# Patient Record
Sex: MALE | Race: WHITE | Employment: FULL TIME
[De-identification: names, ages, dates, MRNs, and addresses within clinical notes are randomized per-mention and may not be internally consistent; named-entity substitution may affect disease eponyms.]

---

## 2017-11-14 LAB
CHOLESTEROL, TOTAL: 155 MG/DL
CHOLESTEROL/HDL RATIO: NORMAL
HDLC SERPL-MCNC: 51 MG/DL (ref 35–70)
LDL CHOLESTEROL CALCULATED: 79 MG/DL (ref 0–160)
TRIGL SERPL-MCNC: 157 MG/DL
VLDLC SERPL CALC-MCNC: NORMAL MG/DL

## 2017-12-23 PROBLEM — G47.33 OSA (OBSTRUCTIVE SLEEP APNEA): Status: ACTIVE | Noted: 2017-12-23

## 2017-12-23 PROBLEM — G25.81 RLS (RESTLESS LEGS SYNDROME): Status: ACTIVE | Noted: 2017-12-23

## 2017-12-23 PROBLEM — G43.909 MIGRAINES: Status: ACTIVE | Noted: 2017-12-23

## 2017-12-23 PROBLEM — D50.9 MICROCYTIC ANEMIA: Status: ACTIVE | Noted: 2017-12-23

## 2017-12-23 PROBLEM — Z72.820 POOR SLEEP: Status: ACTIVE | Noted: 2017-12-23

## 2017-12-23 PROBLEM — R91.1 PULMONARY NODULE: Status: ACTIVE | Noted: 2017-12-23

## 2017-12-23 PROBLEM — E66.9 OBESITY: Status: ACTIVE | Noted: 2017-12-23

## 2017-12-23 PROBLEM — R94.6 BORDERLINE ABNORMAL TFTS: Status: ACTIVE | Noted: 2017-11-14

## 2017-12-23 PROBLEM — D64.9 SYMPTOMATIC ANEMIA: Status: ACTIVE | Noted: 2017-12-23

## 2017-12-23 PROBLEM — Z87.891 FORMER SMOKER: Chronic | Status: ACTIVE | Noted: 2017-12-23

## 2017-12-23 PROBLEM — K58.0 IRRITABLE BOWEL SYNDROME WITH DIARRHEA: Chronic | Status: ACTIVE | Noted: 2017-12-23

## 2018-01-10 ENCOUNTER — TELEPHONE (OUTPATIENT)
Dept: CASE MANAGEMENT | Age: 38
End: 2018-01-10

## 2018-01-10 NOTE — LETTER
CT Lung Screening/ Lung Nodule Program  [address] [place] [address]. [place], [address]       TO:   Yenny Castellon MD    FAX:   [phone]        FROM:   [facility]    DATE:   1/10/2018    PHONE:  [phone]    FAX:    [phone]      Comments:  Incidental Pulmonary Nodule Notification    RE:  Jose Vu  1980        The information contained in this fax message is intended only for Personal and Confidential use of the designated recipient(s) names above. This information is to be handled as Privileged and Confidential. If the reader of this message is not the intended recipient, you are hereby notified that you have received this document in error, and that any review, dissemination, distribution, or copying of this message is strictly prohibited. If you receive this communication in error, please notify us immediately at the above number and return the original message to us by mail. Thank you. Member of [facility]. Medical Imaging Services                              1/10/2018           [facility] [address]  Fax: [phone]      Dear  Yenny Castellon MD :                                      Your patient, Jose Vu (1980) had an imaging study (12/23/17) that noted an incidental pulmonary nodule/s. Follow up was recommended according to [place] for Management of Pulmonary Nodules If this patient is not currently active within your practice, please contact us immediately so we can update records. This patient will not be monitored by our incidental pulmonary nodule program this letter serves as a notification only. If you have any additional questions or concerns regarding our Lung Nodule Program or our CT Lung Cancer Screening Program,  please don't hesitate to call.             Sincerely,

## 2019-04-17 ENCOUNTER — HOSPITAL ENCOUNTER (OUTPATIENT)
Dept: NUCLEAR MEDICINE | Age: 39
Discharge: HOME | End: 2019-04-17
Payer: COMMERCIAL

## 2019-04-17 DIAGNOSIS — K92.0 GASTROINTESTINAL HEMORRHAGE WITH HEMATEMESIS: ICD-10-CM

## 2019-04-17 PROCEDURE — A9512 TC99M PERTECHNETATE: HCPCS | Performed by: RADIOLOGY

## 2019-04-17 PROCEDURE — 78290 INTESTINE IMAGING: CPT

## 2019-04-17 PROCEDURE — 3430000000 HC RX DIAGNOSTIC RADIOPHARMACEUTICAL: Performed by: RADIOLOGY

## 2019-04-17 PROCEDURE — 2500000003 HC RX 250 WO HCPCS: Performed by: RADIOLOGY

## 2019-04-17 RX ADMIN — Medication 15 MILLICURIE: at 10:28

## 2019-04-17 RX ADMIN — GLUCAGON HYDROCHLORIDE 1 MG: KIT at 10:42

## 2019-05-06 VITALS
SYSTOLIC BLOOD PRESSURE: 130 MMHG | DIASTOLIC BLOOD PRESSURE: 78 MMHG | WEIGHT: 229 LBS | HEIGHT: 71 IN | TEMPERATURE: 97.5 F | HEART RATE: 118 BPM | BODY MASS INDEX: 32.06 KG/M2

## 2019-05-16 ENCOUNTER — HOSPITAL ENCOUNTER (OUTPATIENT)
Age: 39
Discharge: HOME | End: 2019-05-18
Payer: COMMERCIAL

## 2019-05-16 ENCOUNTER — OFFICE VISIT (OUTPATIENT)
Dept: PRIMARY CARE CLINIC | Age: 39
End: 2019-05-16
Payer: COMMERCIAL

## 2019-05-16 VITALS
HEART RATE: 106 BPM | TEMPERATURE: 96.9 F | HEIGHT: 71 IN | WEIGHT: 235.2 LBS | DIASTOLIC BLOOD PRESSURE: 80 MMHG | BODY MASS INDEX: 32.93 KG/M2 | SYSTOLIC BLOOD PRESSURE: 124 MMHG

## 2019-05-16 DIAGNOSIS — K58.2 IRRITABLE BOWEL SYNDROME WITH BOTH CONSTIPATION AND DIARRHEA: ICD-10-CM

## 2019-05-16 DIAGNOSIS — E03.4 HYPOTHYROIDISM DUE TO ACQUIRED ATROPHY OF THYROID: ICD-10-CM

## 2019-05-16 DIAGNOSIS — K21.9 GASTROESOPHAGEAL REFLUX DISEASE WITHOUT ESOPHAGITIS: Chronic | ICD-10-CM

## 2019-05-16 DIAGNOSIS — G47.33 OSA (OBSTRUCTIVE SLEEP APNEA): ICD-10-CM

## 2019-05-16 DIAGNOSIS — E03.4 HYPOTHYROIDISM DUE TO ACQUIRED ATROPHY OF THYROID: Primary | ICD-10-CM

## 2019-05-16 DIAGNOSIS — D50.0 IRON DEFICIENCY ANEMIA DUE TO CHRONIC BLOOD LOSS: ICD-10-CM

## 2019-05-16 LAB
T3 TOTAL: 115.1 NG/DL (ref 80–200)
T4 TOTAL: 6.8 MCG/DL (ref 4.5–11.7)
TSH SERPL DL<=0.05 MIU/L-ACNC: 1.77 UIU/ML (ref 0.27–4.2)

## 2019-05-16 PROCEDURE — 36415 COLL VENOUS BLD VENIPUNCTURE: CPT

## 2019-05-16 PROCEDURE — 99214 OFFICE O/P EST MOD 30 MIN: CPT | Performed by: INTERNAL MEDICINE

## 2019-05-16 PROCEDURE — 84436 ASSAY OF TOTAL THYROXINE: CPT

## 2019-05-16 PROCEDURE — 84480 ASSAY TRIIODOTHYRONINE (T3): CPT

## 2019-05-16 PROCEDURE — 84443 ASSAY THYROID STIM HORMONE: CPT

## 2019-05-16 ASSESSMENT — PATIENT HEALTH QUESTIONNAIRE - PHQ9
1. LITTLE INTEREST OR PLEASURE IN DOING THINGS: 0
SUM OF ALL RESPONSES TO PHQ9 QUESTIONS 1 & 2: 0
SUM OF ALL RESPONSES TO PHQ QUESTIONS 1-9: 0
SUM OF ALL RESPONSES TO PHQ QUESTIONS 1-9: 0
2. FEELING DOWN, DEPRESSED OR HOPELESS: 0

## 2019-05-16 ASSESSMENT — ENCOUNTER SYMPTOMS
GASTROINTESTINAL NEGATIVE: 1
EYE PAIN: 0
SORE THROAT: 0
COUGH: 0
SINUS PAIN: 0
EYE ITCHING: 0
FACIAL SWELLING: 0
PHOTOPHOBIA: 0
CHEST TIGHTNESS: 0
SHORTNESS OF BREATH: 0
VOICE CHANGE: 0
WHEEZING: 0
EYE DISCHARGE: 0

## 2019-05-16 NOTE — PROGRESS NOTES
[address] [facility],[address]  Phone: [phone]  Fax: [phone]    Patient:  Hannah Fischer 34 y.o. male                                 Date of Service: 5/16/19                            Chiefcomplaint:   Chief Complaint   Patient presents with    Gastroesophageal Reflux    Bleeding/Bruising    Anemia         History of Present Illness: The patient is a 34 y.o. male  presented to the clinic with complaints as above. Gastro Intestinal bleeding with associated anemia - remains tired, iron levels still low but not anemic as per Hem. 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 Hx of borderline thyroid labs    Review of Systems:   Review of Systems   Constitutional: Negative. HENT: Negative for congestion, ear pain, facial swelling, hearing loss, mouth sores, nosebleeds, sinus pain, sneezing, sore throat, tinnitus and voice change. Eyes: Negative for photophobia, pain, discharge, itching and visual disturbance. Respiratory: Negative for cough, chest tightness, shortness of breath and wheezing. Cardiovascular: Negative. Gastrointestinal: Negative. Endocrine: Negative. Genitourinary: Negative. Musculoskeletal: Negative. Skin: Negative. Hematological: Negative. Psychiatric/Behavioral: Negative for agitation, behavioral problems, confusion, decreased concentration and sleep disturbance. The patient is not nervous/anxious. Past Medical History:      Diagnosis Date    Anemia 11/2014    Borderline abnormal TFTs 11/2017    Not yet on thyroid hormone replacement. PCP was planning to repeat TFTs soon first.    GERD (gastroesophageal reflux disease)     For the last 15+ yrs as of 2017.     Hyperlipidemia     IBS (irritable bowel syndrome)     with long-pattern of ~4 loose BMs per day, increased to 5-6/day in the latter half of 2017       Past Surgical History:        Procedure Laterality Date    ANKLE SURGERY Right 09/09/2014    ORIF right ankle fracture CINDY Hamilton MD    COLONOSCOPY  12/21/2017    [facility] Negative per pt.  UPPER GASTROINTESTINAL ENDOSCOPY  12/21/2017    Done at [address] [place] [address]. Negative except for possibly a couple of spots of gastritis per pt - biopsied, results pending. Allergies:    Patient has no known allergies. Social History:   Social History     Socioeconomic History    Marital status:      Spouse name: Not on file    Number of children: Not on file    Years of education: Not on file    Highest education level: Not on file   Occupational History    Not on file   Social Needs    Financial resource strain: Not on file    Food insecurity:     Worry: Not on file     Inability: Not on file    Transportation needs:     Medical: Not on file     Non-medical: Not on file   Tobacco Use    Smoking status: Former Smoker     Packs/day: 1.00     Types: Cigarettes    Smokeless tobacco: Never Used   Substance and Sexual Activity    Alcohol use:  Yes     Alcohol/week: 2.4 oz     Types: 4 Cans of beer per week    Drug use: No    Sexual activity: Not on file   Lifestyle    Physical activity:     Days per week: Not on file     Minutes per session: Not on file    Stress: Not on file   Relationships    Social connections:     Talks on phone: Not on file     Gets together: Not on file     Attends Anabaptist service: Not on file     Active member of club or organization: Not on file     Attends meetings of clubs or organizations: Not on file     Relationship status: Not on file    Intimate partner violence:     Fear of current or ex partner: Not on file     Emotionally abused: Not on file     Physically abused: Not on file     Forced sexual activity: Not on file   Other Topics Concern [employer]: [email or web address] [email or web address]  Please bring any questions to me at your next visit. Return to Office: No follow-ups on file. Medication List:    Current Outpatient Medications   Medication Sig Dispense Refill    pantoprazole (PROTONIX) 40 MG tablet Take 40 mg by mouth daily.  dicyclomine (BENTYL) 10 MG capsule Take 10 mg by mouth 4 times daily (before meals and nightly)      ferrous sulfate 325 (65 Fe) MG tablet Take 325 mg by mouth daily (with breakfast)      HYDROcodone-acetaminophen (NORCO) 5-325 MG per tablet Take 1 tablet by mouth every 6 hours as needed for Pain. No current facility-administered medications for this visit. Ursula Cross MD       This document may have been prepared at least partiallythrough the use of voice recognition software. Although effort is taken to assure the accuracy of this document, it is possible that grammatical, syntax,  or spelling errors may occur.

## 2019-05-23 ENCOUNTER — TELEPHONE (OUTPATIENT)
Dept: PRIMARY CARE CLINIC | Age: 39
End: 2019-05-23

## 2019-05-23 NOTE — TELEPHONE ENCOUNTER
Pt lm stating that he never heard anything about sleep study not sure how referral work with new system. Tried to call pt back to see what facility but # keep ringing.

## 2019-09-22 ENCOUNTER — APPOINTMENT (OUTPATIENT)
Dept: ULTRASOUND IMAGING | Age: 39
DRG: 446 | End: 2019-09-22
Payer: COMMERCIAL

## 2019-09-22 ENCOUNTER — APPOINTMENT (OUTPATIENT)
Dept: CT IMAGING | Age: 39
DRG: 446 | End: 2019-09-22
Payer: COMMERCIAL

## 2019-09-22 ENCOUNTER — HOSPITAL ENCOUNTER (INPATIENT)
Age: 39
LOS: 2 days | Discharge: HOME | DRG: 446 | End: 2019-09-24
Attending: EMERGENCY MEDICINE | Admitting: INTERNAL MEDICINE
Payer: COMMERCIAL

## 2019-09-22 ENCOUNTER — APPOINTMENT (OUTPATIENT)
Dept: GENERAL RADIOLOGY | Age: 39
DRG: 446 | End: 2019-09-22
Payer: COMMERCIAL

## 2019-09-22 DIAGNOSIS — K80.50 CHOLEDOCHOLITHIASIS: Primary | ICD-10-CM

## 2019-09-22 LAB
ALBUMIN SERPL-MCNC: 4.4 G/DL (ref 3.5–5.2)
ALP BLD-CCNC: 66 U/L (ref 40–129)
ALT SERPL-CCNC: 78 U/L (ref 0–40)
ANION GAP SERPL CALCULATED.3IONS-SCNC: 12 MMOL/L (ref 7–16)
AST SERPL-CCNC: 89 U/L (ref 0–39)
BILIRUB SERPL-MCNC: 0.2 MG/DL (ref 0–1.2)
BUN BLDV-MCNC: 13 MG/DL (ref 6–20)
CALCIUM SERPL-MCNC: 9 MG/DL (ref 8.6–10.2)
CHLORIDE BLD-SCNC: 104 MMOL/L (ref 98–107)
CO2: 25 MMOL/L (ref 22–29)
CREAT SERPL-MCNC: 1.1 MG/DL (ref 0.7–1.2)
GFR AFRICAN AMERICAN: >60
GFR NON-AFRICAN AMERICAN: >60 ML/MIN/1.73
GLUCOSE BLD-MCNC: 107 MG/DL (ref 74–99)
HCT VFR BLD CALC: 40 % (ref 37–54)
HEMOGLOBIN: 12.7 G/DL (ref 12.5–16.5)
LACTIC ACID: 1.6 MMOL/L (ref 0.5–2.2)
LIPASE: 44 U/L (ref 13–60)
MCH RBC QN AUTO: 28 PG (ref 26–35)
MCHC RBC AUTO-ENTMCNC: 31.8 % (ref 32–34.5)
MCV RBC AUTO: 88.1 FL (ref 80–99.9)
PDW BLD-RTO: 15.1 FL (ref 11.5–15)
PLATELET # BLD: 244 E9/L (ref 130–450)
PMV BLD AUTO: 10.7 FL (ref 7–12)
POTASSIUM SERPL-SCNC: 4.1 MMOL/L (ref 3.5–5)
PRO-BNP: 78 PG/ML (ref 0–125)
RBC # BLD: 4.54 E12/L (ref 3.8–5.8)
SODIUM BLD-SCNC: 141 MMOL/L (ref 132–146)
TOTAL PROTEIN: 6.7 G/DL (ref 6.4–8.3)
TROPONIN: <0.01 NG/ML (ref 0–0.03)
WBC # BLD: 8.7 E9/L (ref 4.5–11.5)

## 2019-09-22 PROCEDURE — 99285 EMERGENCY DEPT VISIT HI MDM: CPT

## 2019-09-22 PROCEDURE — 96375 TX/PRO/DX INJ NEW DRUG ADDON: CPT

## 2019-09-22 PROCEDURE — 93005 ELECTROCARDIOGRAM TRACING: CPT | Performed by: EMERGENCY MEDICINE

## 2019-09-22 PROCEDURE — 83880 ASSAY OF NATRIURETIC PEPTIDE: CPT

## 2019-09-22 PROCEDURE — 76705 ECHO EXAM OF ABDOMEN: CPT

## 2019-09-22 PROCEDURE — 85027 COMPLETE CBC AUTOMATED: CPT

## 2019-09-22 PROCEDURE — 96365 THER/PROPH/DIAG IV INF INIT: CPT

## 2019-09-22 PROCEDURE — 6370000000 HC RX 637 (ALT 250 FOR IP): Performed by: INTERNAL MEDICINE

## 2019-09-22 PROCEDURE — G0378 HOSPITAL OBSERVATION PER HR: HCPCS

## 2019-09-22 PROCEDURE — 96374 THER/PROPH/DIAG INJ IV PUSH: CPT

## 2019-09-22 PROCEDURE — 6360000004 HC RX CONTRAST MEDICATION: Performed by: RADIOLOGY

## 2019-09-22 PROCEDURE — 83605 ASSAY OF LACTIC ACID: CPT

## 2019-09-22 PROCEDURE — 2580000003 HC RX 258: Performed by: INTERNAL MEDICINE

## 2019-09-22 PROCEDURE — 71046 X-RAY EXAM CHEST 2 VIEWS: CPT

## 2019-09-22 PROCEDURE — 84484 ASSAY OF TROPONIN QUANT: CPT

## 2019-09-22 PROCEDURE — 74177 CT ABD & PELVIS W/CONTRAST: CPT

## 2019-09-22 PROCEDURE — 6360000002 HC RX W HCPCS: Performed by: STUDENT IN AN ORGANIZED HEALTH CARE EDUCATION/TRAINING PROGRAM

## 2019-09-22 PROCEDURE — 83690 ASSAY OF LIPASE: CPT

## 2019-09-22 PROCEDURE — 80053 COMPREHEN METABOLIC PANEL: CPT

## 2019-09-22 PROCEDURE — C9113 INJ PANTOPRAZOLE SODIUM, VIA: HCPCS | Performed by: STUDENT IN AN ORGANIZED HEALTH CARE EDUCATION/TRAINING PROGRAM

## 2019-09-22 PROCEDURE — 2580000003 HC RX 258: Performed by: RADIOLOGY

## 2019-09-22 PROCEDURE — 1200000000 HC SEMI PRIVATE

## 2019-09-22 PROCEDURE — 2580000003 HC RX 258: Performed by: STUDENT IN AN ORGANIZED HEALTH CARE EDUCATION/TRAINING PROGRAM

## 2019-09-22 PROCEDURE — 96367 TX/PROPH/DG ADDL SEQ IV INF: CPT

## 2019-09-22 PROCEDURE — 2500000003 HC RX 250 WO HCPCS: Performed by: STUDENT IN AN ORGANIZED HEALTH CARE EDUCATION/TRAINING PROGRAM

## 2019-09-22 RX ORDER — SODIUM CHLORIDE 9 MG/ML
INJECTION, SOLUTION INTRAVENOUS CONTINUOUS
Status: DISCONTINUED | OUTPATIENT
Start: 2019-09-22 | End: 2019-09-23

## 2019-09-22 RX ORDER — PANTOPRAZOLE SODIUM 40 MG/1
40 TABLET, DELAYED RELEASE ORAL
Status: DISCONTINUED | OUTPATIENT
Start: 2019-09-23 | End: 2019-09-24 | Stop reason: HOSPADM

## 2019-09-22 RX ORDER — PANTOPRAZOLE SODIUM 40 MG/10ML
40 INJECTION, POWDER, LYOPHILIZED, FOR SOLUTION INTRAVENOUS ONCE
Status: COMPLETED | OUTPATIENT
Start: 2019-09-22 | End: 2019-09-22

## 2019-09-22 RX ORDER — MORPHINE SULFATE 2 MG/ML
1 INJECTION, SOLUTION INTRAMUSCULAR; INTRAVENOUS EVERY 4 HOURS PRN
Status: DISCONTINUED | OUTPATIENT
Start: 2019-09-22 | End: 2019-09-23

## 2019-09-22 RX ORDER — DICYCLOMINE HYDROCHLORIDE 10 MG/1
10 CAPSULE ORAL
Status: DISCONTINUED | OUTPATIENT
Start: 2019-09-22 | End: 2019-09-24 | Stop reason: HOSPADM

## 2019-09-22 RX ORDER — MORPHINE SULFATE 8 MG/ML
8 INJECTION, SOLUTION INTRAMUSCULAR; INTRAVENOUS ONCE
Status: COMPLETED | OUTPATIENT
Start: 2019-09-22 | End: 2019-09-22

## 2019-09-22 RX ORDER — ACETAMINOPHEN 325 MG/1
650 TABLET ORAL EVERY 4 HOURS PRN
Status: DISCONTINUED | OUTPATIENT
Start: 2019-09-22 | End: 2019-09-23

## 2019-09-22 RX ORDER — SODIUM CHLORIDE 0.9 % (FLUSH) 0.9 %
10 SYRINGE (ML) INJECTION PRN
Status: DISCONTINUED | OUTPATIENT
Start: 2019-09-22 | End: 2019-09-23 | Stop reason: SDUPTHER

## 2019-09-22 RX ORDER — 0.9 % SODIUM CHLORIDE 0.9 %
1000 INTRAVENOUS SOLUTION INTRAVENOUS ONCE
Status: COMPLETED | OUTPATIENT
Start: 2019-09-22 | End: 2019-09-22

## 2019-09-22 RX ADMIN — METRONIDAZOLE 500 MG: 500 INJECTION, SOLUTION INTRAVENOUS at 20:20

## 2019-09-22 RX ADMIN — SODIUM CHLORIDE: 9 INJECTION, SOLUTION INTRAVENOUS at 22:53

## 2019-09-22 RX ADMIN — IOPAMIDOL 110 ML: 755 INJECTION, SOLUTION INTRAVENOUS at 18:51

## 2019-09-22 RX ADMIN — Medication 10 ML: at 18:51

## 2019-09-22 RX ADMIN — MORPHINE SULFATE 8 MG: 8 INJECTION INTRAVENOUS at 19:37

## 2019-09-22 RX ADMIN — SODIUM CHLORIDE 1000 ML: 9 INJECTION, SOLUTION INTRAVENOUS at 17:33

## 2019-09-22 RX ADMIN — DICYCLOMINE HYDROCHLORIDE 10 MG: 10 CAPSULE ORAL at 22:52

## 2019-09-22 RX ADMIN — SODIUM CHLORIDE: 9 INJECTION, SOLUTION INTRAVENOUS at 20:20

## 2019-09-22 RX ADMIN — PANTOPRAZOLE SODIUM 40 MG: 40 INJECTION, POWDER, FOR SOLUTION INTRAVENOUS at 19:02

## 2019-09-22 RX ADMIN — CEFTRIAXONE 2 G: 2 INJECTION, POWDER, FOR SOLUTION INTRAMUSCULAR; INTRAVENOUS at 19:41

## 2019-09-22 ASSESSMENT — PAIN DESCRIPTION - LOCATION: LOCATION: CHEST

## 2019-09-22 ASSESSMENT — ENCOUNTER SYMPTOMS
ABDOMINAL PAIN: 1
COUGH: 0
DIARRHEA: 1
BLOOD IN STOOL: 0
NAUSEA: 1
TROUBLE SWALLOWING: 0
VOICE CHANGE: 0
COLOR CHANGE: 0
CONSTIPATION: 0
BACK PAIN: 0
SHORTNESS OF BREATH: 0
SORE THROAT: 0
VOMITING: 0

## 2019-09-22 ASSESSMENT — PAIN SCALES - GENERAL
PAINLEVEL_OUTOF10: 4
PAINLEVEL_OUTOF10: 0
PAINLEVEL_OUTOF10: 0

## 2019-09-22 ASSESSMENT — PAIN DESCRIPTION - FREQUENCY: FREQUENCY: CONTINUOUS

## 2019-09-22 ASSESSMENT — PAIN DESCRIPTION - DESCRIPTORS: DESCRIPTORS: PRESSURE;DULL

## 2019-09-22 NOTE — ED PROVIDER NOTES
person, place, and time. Skin: Skin is warm and dry. No rash noted. He is not diaphoretic. No erythema. No pallor. Psychiatric: He has a normal mood and affect. His behavior is normal. Judgment and thought content normal.   Nursing note and vitals reviewed. Procedures     MDM   Patient presents to the ED for chest pain, abdominal pain, diarrhea. Differential diagnoses included but not limited to cholecystitis, pancreatitis, gastritis, other acute intra-abdominal process, ACS, chemical pneumonitis, peptic ulcer disease. Not suspicious for PE at this time. Workup in the ED revealed choledocholithiasis. Common bile duct dilation of 10 mm on CT scan. Ultrasound was performed and common bile duct was dilated to 9 mm on ultrasound. Patient was given IV fluids, IV morphine, IV antibiotics for their symptoms with good improvement. Patient requires further work-up and treatment as an inpatient. I discussed case with internal medicine and GI. Internal medicine will admit and GI will provide consultation. The patient specifically states that he sees Dr. Vitor Patel for GI and this is who he requested consultation with. EKG: This EKG is signed and interpreted by me. Rate: 55  Rhythm: Sinus  Interpretation: Sinus bradycardia, nonspecific T wave abnormality and lead III and V1  Comparison: changes compared to previous EKG on 12-, sinus bradycardia now present, nonspecific T wave changes are slightly more prominent today but may be due to lead placement  Heart Score for Risk of Major Adverse Cardiac Events    HISTORY  Highly suspicious  +2  Moderately suspicious +1  Slightly suspicious  +0    EKG  Significant ST depression +2  Non specific repolarization +1  Normal    +0    AGE  >=65    +2  45-65    +1  <45    +0    RISK FACTORS*  > or = 3 risk factors or  +2    history of atherosclerotic    disease.    1-2 risk factors  +1  No risk factors   +0    TROPONIN  >= 3 x normal limit  +2  1-3 x normal [place] for GI. Dr. Irena Hammond is on-call for them. He has so he give the IV antibiotics and continue IV fluids. [LS]      ED Course User Index  [LS] Erika Trujillo DO      ED Course as of Sep 22 2011   Tenisha Whiting Sep 22, 2019   3830 Patient has choledocholithiasis on CT scan. We will plan for admission. We ordered Rocephin and Flagyl intravenously. Patient requesting pain medication so we will give him IV morphine.    [LS]   1929 Case discussed with Dr. Domenica Berman. She agrees to accept the patient for admission. She is requesting that I consult Dr. Adin Mon, GI.     [LS]   2006 Case discussed with Dr. Irena Hammond GI. He agrees to provide consultation for the patient. The patient states that he normally goes to Dr. Mahamed No and Dr. Leticia Lucas for GI. Dr. Irena Hammond is on-call for them. He has so he give the IV antibiotics and continue IV fluids. [LS]      ED Course User Index  [LS] Erika Trujillo DO       --------------------------------------------- PAST HISTORY ---------------------------------------------  Past Medical History:  has a past medical history of Anemia, Borderline abnormal TFTs, GERD (gastroesophageal reflux disease), Hyperlipidemia, and IBS (irritable bowel syndrome). Past Surgical History:  has a past surgical history that includes Upper gastrointestinal endoscopy (12/21/2017); Ankle surgery (Right, 09/09/2014); and Colonoscopy (12/21/2017). Social History:  reports that he has quit smoking. His smoking use included cigarettes. He smoked 1.00 pack per day. He has never used smokeless tobacco. He reports that he drinks about 4.0 standard drinks of alcohol per week. He reports that he does not use drugs. Family History: family history is not on file. The patients home medications have been reviewed. Allergies: Patient has no known allergies.     -------------------------------------------------- RESULTS -------------------------------------------------    LABS:  Results for orders placed

## 2019-09-23 PROBLEM — R94.6 BORDERLINE ABNORMAL TFTS: Status: ACTIVE | Noted: 2017-11-01

## 2019-09-23 PROBLEM — E66.9 OBESITY: Status: RESOLVED | Noted: 2017-12-23 | Resolved: 2019-09-23

## 2019-09-23 PROBLEM — D50.9 MICROCYTIC ANEMIA: Status: RESOLVED | Noted: 2017-12-23 | Resolved: 2019-09-23

## 2019-09-23 PROBLEM — Z72.820 POOR SLEEP: Status: RESOLVED | Noted: 2017-12-23 | Resolved: 2019-09-23

## 2019-09-23 PROBLEM — R94.6 BORDERLINE ABNORMAL TFTS: Status: RESOLVED | Noted: 2017-11-14 | Resolved: 2019-09-23

## 2019-09-23 PROBLEM — G47.33 OSA (OBSTRUCTIVE SLEEP APNEA): Status: RESOLVED | Noted: 2017-12-23 | Resolved: 2019-09-23

## 2019-09-23 PROBLEM — D64.9 SYMPTOMATIC ANEMIA: Status: RESOLVED | Noted: 2017-12-23 | Resolved: 2019-09-23

## 2019-09-23 PROBLEM — G25.81 RLS (RESTLESS LEGS SYNDROME): Status: RESOLVED | Noted: 2017-12-23 | Resolved: 2019-09-23

## 2019-09-23 LAB
ALBUMIN SERPL-MCNC: 3.9 G/DL (ref 3.5–5.2)
ALP BLD-CCNC: 66 U/L (ref 40–129)
ALT SERPL-CCNC: 100 U/L (ref 0–40)
ANION GAP SERPL CALCULATED.3IONS-SCNC: 8 MMOL/L (ref 7–16)
AST SERPL-CCNC: 73 U/L (ref 0–39)
BILIRUB SERPL-MCNC: 0.3 MG/DL (ref 0–1.2)
BUN BLDV-MCNC: 10 MG/DL (ref 6–20)
CALCIUM SERPL-MCNC: 8.3 MG/DL (ref 8.6–10.2)
CHLORIDE BLD-SCNC: 104 MMOL/L (ref 98–107)
CO2: 25 MMOL/L (ref 22–29)
CREAT SERPL-MCNC: 1 MG/DL (ref 0.7–1.2)
EKG ATRIAL RATE: 55 BPM
EKG P AXIS: 20 DEGREES
EKG P-R INTERVAL: 110 MS
EKG Q-T INTERVAL: 416 MS
EKG QRS DURATION: 92 MS
EKG QTC CALCULATION (BAZETT): 397 MS
EKG R AXIS: 19 DEGREES
EKG T AXIS: 0 DEGREES
EKG VENTRICULAR RATE: 55 BPM
GFR AFRICAN AMERICAN: >60
GFR NON-AFRICAN AMERICAN: >60 ML/MIN/1.73
GLUCOSE BLD-MCNC: 102 MG/DL (ref 74–99)
HCT VFR BLD CALC: 38 % (ref 37–54)
HEMOGLOBIN: 12.3 G/DL (ref 12.5–16.5)
MCH RBC QN AUTO: 28.7 PG (ref 26–35)
MCHC RBC AUTO-ENTMCNC: 32.4 % (ref 32–34.5)
MCV RBC AUTO: 88.6 FL (ref 80–99.9)
PDW BLD-RTO: 15.1 FL (ref 11.5–15)
PLATELET # BLD: 198 E9/L (ref 130–450)
PMV BLD AUTO: 10.6 FL (ref 7–12)
POTASSIUM SERPL-SCNC: 3.6 MMOL/L (ref 3.5–5)
RBC # BLD: 4.29 E12/L (ref 3.8–5.8)
SODIUM BLD-SCNC: 137 MMOL/L (ref 132–146)
TOTAL PROTEIN: 5.9 G/DL (ref 6.4–8.3)
WBC # BLD: 5.9 E9/L (ref 4.5–11.5)

## 2019-09-23 PROCEDURE — 36415 COLL VENOUS BLD VENIPUNCTURE: CPT

## 2019-09-23 PROCEDURE — 6370000000 HC RX 637 (ALT 250 FOR IP): Performed by: INTERNAL MEDICINE

## 2019-09-23 PROCEDURE — 96366 THER/PROPH/DIAG IV INF ADDON: CPT

## 2019-09-23 PROCEDURE — 2500000003 HC RX 250 WO HCPCS: Performed by: INTERNAL MEDICINE

## 2019-09-23 PROCEDURE — 2580000003 HC RX 258: Performed by: STUDENT IN AN ORGANIZED HEALTH CARE EDUCATION/TRAINING PROGRAM

## 2019-09-23 PROCEDURE — 1200000000 HC SEMI PRIVATE

## 2019-09-23 PROCEDURE — G0378 HOSPITAL OBSERVATION PER HR: HCPCS

## 2019-09-23 PROCEDURE — 6360000002 HC RX W HCPCS: Performed by: INTERNAL MEDICINE

## 2019-09-23 PROCEDURE — 96375 TX/PRO/DX INJ NEW DRUG ADDON: CPT

## 2019-09-23 PROCEDURE — 85027 COMPLETE CBC AUTOMATED: CPT

## 2019-09-23 PROCEDURE — 2580000003 HC RX 258: Performed by: INTERNAL MEDICINE

## 2019-09-23 PROCEDURE — 80053 COMPREHEN METABOLIC PANEL: CPT

## 2019-09-23 PROCEDURE — 93010 ELECTROCARDIOGRAM REPORT: CPT | Performed by: INTERNAL MEDICINE

## 2019-09-23 PROCEDURE — 96376 TX/PRO/DX INJ SAME DRUG ADON: CPT

## 2019-09-23 PROCEDURE — 99232 SBSQ HOSP IP/OBS MODERATE 35: CPT | Performed by: TRANSPLANT SURGERY

## 2019-09-23 RX ORDER — SODIUM CHLORIDE 0.9 % (FLUSH) 0.9 %
10 SYRINGE (ML) INJECTION PRN
Status: DISCONTINUED | OUTPATIENT
Start: 2019-09-23 | End: 2019-09-24 | Stop reason: HOSPADM

## 2019-09-23 RX ORDER — ACETAMINOPHEN 325 MG/1
650 TABLET ORAL EVERY 4 HOURS PRN
Status: DISCONTINUED | OUTPATIENT
Start: 2019-09-23 | End: 2019-09-24 | Stop reason: HOSPADM

## 2019-09-23 RX ORDER — ONDANSETRON 2 MG/ML
4 INJECTION INTRAMUSCULAR; INTRAVENOUS EVERY 6 HOURS PRN
Status: DISCONTINUED | OUTPATIENT
Start: 2019-09-23 | End: 2019-09-24 | Stop reason: HOSPADM

## 2019-09-23 RX ORDER — SODIUM CHLORIDE 0.9 % (FLUSH) 0.9 %
10 SYRINGE (ML) INJECTION EVERY 12 HOURS SCHEDULED
Status: DISCONTINUED | OUTPATIENT
Start: 2019-09-23 | End: 2019-09-24 | Stop reason: HOSPADM

## 2019-09-23 RX ORDER — SODIUM CHLORIDE AND POTASSIUM CHLORIDE .9; .15 G/100ML; G/100ML
SOLUTION INTRAVENOUS CONTINUOUS
Status: DISCONTINUED | OUTPATIENT
Start: 2019-09-23 | End: 2019-09-24 | Stop reason: HOSPADM

## 2019-09-23 RX ORDER — MORPHINE SULFATE 2 MG/ML
2 INJECTION, SOLUTION INTRAMUSCULAR; INTRAVENOUS EVERY 4 HOURS PRN
Status: DISCONTINUED | OUTPATIENT
Start: 2019-09-23 | End: 2019-09-24 | Stop reason: HOSPADM

## 2019-09-23 RX ADMIN — MORPHINE SULFATE 2 MG: 2 INJECTION, SOLUTION INTRAMUSCULAR; INTRAVENOUS at 08:10

## 2019-09-23 RX ADMIN — CEFTRIAXONE 1 G: 1 INJECTION, POWDER, FOR SOLUTION INTRAMUSCULAR; INTRAVENOUS at 20:53

## 2019-09-23 RX ADMIN — MORPHINE SULFATE 1 MG: 2 INJECTION, SOLUTION INTRAMUSCULAR; INTRAVENOUS at 00:37

## 2019-09-23 RX ADMIN — DICYCLOMINE HYDROCHLORIDE 10 MG: 10 CAPSULE ORAL at 20:53

## 2019-09-23 RX ADMIN — POTASSIUM CHLORIDE AND SODIUM CHLORIDE: 900; 150 INJECTION, SOLUTION INTRAVENOUS at 08:07

## 2019-09-23 RX ADMIN — DICYCLOMINE HYDROCHLORIDE 10 MG: 10 CAPSULE ORAL at 05:59

## 2019-09-23 RX ADMIN — ONDANSETRON 4 MG: 2 INJECTION INTRAMUSCULAR; INTRAVENOUS at 16:31

## 2019-09-23 RX ADMIN — PANTOPRAZOLE SODIUM 40 MG: 40 TABLET, DELAYED RELEASE ORAL at 05:59

## 2019-09-23 RX ADMIN — METRONIDAZOLE 500 MG: 500 INJECTION, SOLUTION INTRAVENOUS at 08:07

## 2019-09-23 RX ADMIN — METRONIDAZOLE 500 MG: 500 INJECTION, SOLUTION INTRAVENOUS at 15:20

## 2019-09-23 RX ADMIN — POTASSIUM CHLORIDE AND SODIUM CHLORIDE: 900; 150 INJECTION, SOLUTION INTRAVENOUS at 22:35

## 2019-09-23 RX ADMIN — Medication 10 ML: at 08:10

## 2019-09-23 RX ADMIN — SODIUM CHLORIDE: 9 INJECTION, SOLUTION INTRAVENOUS at 04:35

## 2019-09-23 ASSESSMENT — PAIN - FUNCTIONAL ASSESSMENT
PAIN_FUNCTIONAL_ASSESSMENT: ACTIVITIES ARE NOT PREVENTED
PAIN_FUNCTIONAL_ASSESSMENT: PREVENTS OR INTERFERES SOME ACTIVE ACTIVITIES AND ADLS

## 2019-09-23 ASSESSMENT — PAIN SCALES - GENERAL
PAINLEVEL_OUTOF10: 7
PAINLEVEL_OUTOF10: 0
PAINLEVEL_OUTOF10: 0
PAINLEVEL_OUTOF10: 7
PAINLEVEL_OUTOF10: 0

## 2019-09-23 ASSESSMENT — PAIN DESCRIPTION - FREQUENCY
FREQUENCY: CONTINUOUS
FREQUENCY: CONTINUOUS

## 2019-09-23 ASSESSMENT — PAIN DESCRIPTION - ORIENTATION
ORIENTATION: MID
ORIENTATION: RIGHT;MID

## 2019-09-23 ASSESSMENT — PAIN DESCRIPTION - DESCRIPTORS
DESCRIPTORS: ACHING;DISCOMFORT;DULL
DESCRIPTORS: ACHING;DISCOMFORT;PRESSURE

## 2019-09-23 ASSESSMENT — PAIN DESCRIPTION - LOCATION
LOCATION: ABDOMEN
LOCATION: ABDOMEN

## 2019-09-23 ASSESSMENT — PAIN DESCRIPTION - PAIN TYPE
TYPE: ACUTE PAIN
TYPE: ACUTE PAIN

## 2019-09-23 ASSESSMENT — PAIN DESCRIPTION - PROGRESSION
CLINICAL_PROGRESSION: GRADUALLY WORSENING
CLINICAL_PROGRESSION: GRADUALLY WORSENING

## 2019-09-23 ASSESSMENT — PAIN DESCRIPTION - ONSET
ONSET: ON-GOING
ONSET: ON-GOING

## 2019-09-23 NOTE — PROGRESS NOTES
P Quality Flow/Interdisciplinary Rounds Progress Note        Quality Flow Rounds held on September 23, 2019    Disciplines Attending:  Bedside Nurse, ,  and Nursing Unit Leadership    Uriel Regalado was admitted on 9/22/2019  5:02 PM    Anticipated Discharge Date:  Expected Discharge Date: 09/24/19    Disposition:    Vikram Score:  Vikram Scale Score: 20    Readmission Risk              Risk of Unplanned Readmission:        11           Discussed patient goal for the day, patient clinical progression, and barriers to discharge.   The following Goal(s) of the Day/Commitment(s) have been identified:  Discharge Planning      [facility] [place]  September 23, 2019

## 2019-09-23 NOTE — CONSULTS
[facility] SURGERY  CONSULT NOTE  9/23/2019    Physician Consulted: Dr. Luiz Sage  Reason for Consult: Choledocholithiasis  Referring Physician: Dr. Gayla Guzman    HPI  Corrinne Leash is a 37 y.o. male who presents for evaluation of epigastric pain and RUQ that began last night. He states the pain started after eating it was a crampy pain that radiated to his right shoulder and came in waves. He denies any prior episodes of this abdominal pain. He had a CT abdomen pelvis showed concern for choledocholithiasis. He denies any prior abdominal surgeries or [address]. Past Medical History:   Diagnosis Date    Anemia 11/2014    Borderline abnormal TFTs 11/2017    Not yet on thyroid hormone replacement. PCP was planning to repeat TFTs soon first.    GERD (gastroesophageal reflux disease)     For the last 15+ yrs as of 2017.  Hyperlipidemia     IBS (irritable bowel syndrome)     with long-pattern of ~4 loose BMs per day, increased to 5-6/day in the latter half of 2017       Past Surgical History:   Procedure Laterality Date    ANKLE SURGERY Right 09/09/2014    ORIF right ankle fracture CINDY Ayala MD    COLONOSCOPY  12/21/2017    Done at [facility]. Negative per pt.  UPPER GASTROINTESTINAL ENDOSCOPY  12/21/2017    Done at [address] [place] [address]. Negative except for possibly a couple of spots of gastritis per pt - biopsied, results pending. Medications Prior to Admission    Prior to Admission medications    Medication Sig Start Date End Date Taking? Authorizing Provider   ferrous sulfate 325 (65 Fe) MG tablet Take 60 mg by mouth daily (with breakfast)    Yes Historical Provider, MD   pantoprazole (PROTONIX) 40 MG tablet Take 40 mg by mouth daily.    Yes Historical Provider, MD   dicyclomine (BENTYL) 10 MG capsule Take 10 mg by mouth 4 times daily (before meals and nightly)    Historical Provider, MD   HYDROcodone-acetaminophen (NORCO) 5-325 MG per tablet Take 1 tablet by mouth every 6 hours as needed

## 2019-09-23 NOTE — PLAN OF CARE
Problem: Bowel/Gastric:  Goal: Occurrences of nausea will decrease  Description  Occurrences of nausea will decrease  Outcome: Met This Shift     Problem:  Bowel/Gastric:  Goal: Occurrences of vomiting will decrease  Description  Occurrences of vomiting will decrease  Outcome: Met This Shift     Problem: Pain:  Goal: Pain level will decrease  Description  Pain level will decrease  Outcome: Met This Shift

## 2019-09-23 NOTE — H&P
Internal Medicine History & Physical     Name: Devika Villalta  : 1980  Chief Complaint: Chest Pain (beginning last night, midsternal chest pressure radiating into back) and Shortness of Breath  Primary Care Physician: Yanelis Hidalgo MD  Admission date: 2019  Date of service: 2019     History of Present Illness  Leena Davis is a 37y.o. year old male who  has a past medical history of Anemia, Borderline abnormal TFTs, GERD (gastroesophageal reflux disease), Hyperlipidemia, and IBS (irritable bowel syndrome). Najma Triana is a 37year old male who presented to the ED yesterday with sudden onset of epigastric pain. Patient states he had just finished eating, and was washing dishes when the pain started. Stated it was epigastric, radiated to the back and right shoulder. Pain was worse with movement, relieved by nothing specific. Stated the pain started to slightly improve over 2 hours, but suddenly returned again, which is what led him to come to the hospital. He has never had pain like this before in the past. Associated nausea and diarrhea. In the ED, a CT of the abdomen was performed which showed choledocholithiasis with dilation of the common bile duct. Pain was managed with morphine. He was admitted for further evaluation. States pain has improved today, denies nausea or vomiting since admit. Movement makes sx worse. Nothing makes sx better. Sx severe. There are  family or friends at bedside. The history is provided by patient. He is felt to be a reliable historian. ED course:   Initial blood work and imaging studies performed. Admission recommended by ED physician. Meds in ED consisted of the following: rocephin, flagyl, morphine    Past Medical History:   Diagnosis Date    Anemia 2014    Borderline abnormal TFTs 2017    Not yet on thyroid hormone replacement. PCP was planning to repeat TFTs soon first.    GERD (gastroesophageal reflux disease)     For the last 15+ yrs as of 2017.

## 2019-09-24 ENCOUNTER — APPOINTMENT (OUTPATIENT)
Dept: MRI IMAGING | Age: 39
DRG: 446 | End: 2019-09-24
Payer: COMMERCIAL

## 2019-09-24 VITALS
SYSTOLIC BLOOD PRESSURE: 136 MMHG | OXYGEN SATURATION: 98 % | DIASTOLIC BLOOD PRESSURE: 73 MMHG | TEMPERATURE: 97.7 F | HEART RATE: 48 BPM | RESPIRATION RATE: 16 BRPM | BODY MASS INDEX: 33.1 KG/M2 | WEIGHT: 236.4 LBS | HEIGHT: 71 IN

## 2019-09-24 LAB
ALBUMIN SERPL-MCNC: 3.8 G/DL (ref 3.5–5.2)
ALP BLD-CCNC: 63 U/L (ref 40–129)
ALT SERPL-CCNC: 79 U/L (ref 0–40)
ANION GAP SERPL CALCULATED.3IONS-SCNC: 9 MMOL/L (ref 7–16)
AST SERPL-CCNC: 32 U/L (ref 0–39)
BASOPHILS ABSOLUTE: 0.04 E9/L (ref 0–0.2)
BASOPHILS RELATIVE PERCENT: 0.7 % (ref 0–2)
BILIRUB SERPL-MCNC: 0.2 MG/DL (ref 0–1.2)
BILIRUBIN DIRECT: <0.2 MG/DL (ref 0–0.3)
BILIRUBIN, INDIRECT: NORMAL MG/DL (ref 0–1)
BUN BLDV-MCNC: 9 MG/DL (ref 6–20)
CALCIUM SERPL-MCNC: 8.6 MG/DL (ref 8.6–10.2)
CEA: 1.4 NG/ML (ref 0–5.2)
CHLORIDE BLD-SCNC: 105 MMOL/L (ref 98–107)
CO2: 26 MMOL/L (ref 22–29)
CREAT SERPL-MCNC: 1.1 MG/DL (ref 0.7–1.2)
EOSINOPHILS ABSOLUTE: 0.18 E9/L (ref 0.05–0.5)
EOSINOPHILS RELATIVE PERCENT: 3.2 % (ref 0–6)
GFR AFRICAN AMERICAN: >60
GFR NON-AFRICAN AMERICAN: >60 ML/MIN/1.73
GLUCOSE BLD-MCNC: 96 MG/DL (ref 74–99)
HCT VFR BLD CALC: 39.3 % (ref 37–54)
HEMOGLOBIN: 12.7 G/DL (ref 12.5–16.5)
IMMATURE GRANULOCYTES #: 0.02 E9/L
IMMATURE GRANULOCYTES %: 0.4 % (ref 0–5)
LYMPHOCYTES ABSOLUTE: 1.54 E9/L (ref 1.5–4)
LYMPHOCYTES RELATIVE PERCENT: 27.6 % (ref 20–42)
MCH RBC QN AUTO: 28.2 PG (ref 26–35)
MCHC RBC AUTO-ENTMCNC: 32.3 % (ref 32–34.5)
MCV RBC AUTO: 87.3 FL (ref 80–99.9)
MONOCYTES ABSOLUTE: 0.49 E9/L (ref 0.1–0.95)
MONOCYTES RELATIVE PERCENT: 8.8 % (ref 2–12)
NEUTROPHILS ABSOLUTE: 3.31 E9/L (ref 1.8–7.3)
NEUTROPHILS RELATIVE PERCENT: 59.3 % (ref 43–80)
PDW BLD-RTO: 14.9 FL (ref 11.5–15)
PLATELET # BLD: 216 E9/L (ref 130–450)
PMV BLD AUTO: 10.6 FL (ref 7–12)
POTASSIUM REFLEX MAGNESIUM: 3.9 MMOL/L (ref 3.5–5)
RBC # BLD: 4.5 E12/L (ref 3.8–5.8)
SODIUM BLD-SCNC: 140 MMOL/L (ref 132–146)
TOTAL PROTEIN: 6.1 G/DL (ref 6.4–8.3)
WBC # BLD: 5.6 E9/L (ref 4.5–11.5)

## 2019-09-24 PROCEDURE — G0008 ADMIN INFLUENZA VIRUS VAC: HCPCS | Performed by: INTERNAL MEDICINE

## 2019-09-24 PROCEDURE — 36415 COLL VENOUS BLD VENIPUNCTURE: CPT

## 2019-09-24 PROCEDURE — 6360000002 HC RX W HCPCS: Performed by: INTERNAL MEDICINE

## 2019-09-24 PROCEDURE — 80076 HEPATIC FUNCTION PANEL: CPT

## 2019-09-24 PROCEDURE — 2500000003 HC RX 250 WO HCPCS: Performed by: INTERNAL MEDICINE

## 2019-09-24 PROCEDURE — 86301 IMMUNOASSAY TUMOR CA 19-9: CPT

## 2019-09-24 PROCEDURE — 90686 IIV4 VACC NO PRSV 0.5 ML IM: CPT | Performed by: INTERNAL MEDICINE

## 2019-09-24 PROCEDURE — G0378 HOSPITAL OBSERVATION PER HR: HCPCS

## 2019-09-24 PROCEDURE — 74183 MRI ABD W/O CNTR FLWD CNTR: CPT

## 2019-09-24 PROCEDURE — 85025 COMPLETE CBC W/AUTO DIFF WBC: CPT

## 2019-09-24 PROCEDURE — 82378 CARCINOEMBRYONIC ANTIGEN: CPT

## 2019-09-24 PROCEDURE — 6360000004 HC RX CONTRAST MEDICATION: Performed by: RADIOLOGY

## 2019-09-24 PROCEDURE — 80053 COMPREHEN METABOLIC PANEL: CPT

## 2019-09-24 PROCEDURE — A9579 GAD-BASE MR CONTRAST NOS,1ML: HCPCS | Performed by: RADIOLOGY

## 2019-09-24 PROCEDURE — 2580000003 HC RX 258: Performed by: INTERNAL MEDICINE

## 2019-09-24 PROCEDURE — 6370000000 HC RX 637 (ALT 250 FOR IP): Performed by: INTERNAL MEDICINE

## 2019-09-24 RX ORDER — DICYCLOMINE HYDROCHLORIDE 10 MG/1
10 CAPSULE ORAL
Qty: 60 CAPSULE | Refills: 0 | Status: ON HOLD | OUTPATIENT
Start: 2019-09-24 | End: 2019-10-09 | Stop reason: HOSPADM

## 2019-09-24 RX ADMIN — INFLUENZA A VIRUS A/BRISBANE/02/2018 IVR-190 (H1N1) ANTIGEN (PROPIOLACTONE INACTIVATED), INFLUENZA A VIRUS A/KANSAS/14/2017 X-327 (H3N2) ANTIGEN (PROPIOLACTONE INACTIVATED), INFLUENZA B VIRUS B/MARYLAND/15/2016 ANTIGEN (PROPIOLACTONE INACTIVATED), INFLUENZA B VIRUS B/PHUKET/3073/2013 BVR-1B ANTIGEN (PROPIOLACTONE INACTIVATED) 0.5 ML: 15; 15; 15; 15 INJECTION, SUSPENSION INTRAMUSCULAR at 10:23

## 2019-09-24 RX ADMIN — DICYCLOMINE HYDROCHLORIDE 10 MG: 10 CAPSULE ORAL at 06:11

## 2019-09-24 RX ADMIN — METRONIDAZOLE 500 MG: 500 INJECTION, SOLUTION INTRAVENOUS at 10:22

## 2019-09-24 RX ADMIN — POTASSIUM CHLORIDE AND SODIUM CHLORIDE: 900; 150 INJECTION, SOLUTION INTRAVENOUS at 15:04

## 2019-09-24 RX ADMIN — PANTOPRAZOLE SODIUM 40 MG: 40 TABLET, DELAYED RELEASE ORAL at 06:11

## 2019-09-24 RX ADMIN — METRONIDAZOLE 500 MG: 500 INJECTION, SOLUTION INTRAVENOUS at 00:41

## 2019-09-24 RX ADMIN — GADOTERIDOL 20 ML: 279.3 INJECTION, SOLUTION INTRAVENOUS at 09:08

## 2019-09-24 RX ADMIN — DICYCLOMINE HYDROCHLORIDE 10 MG: 10 CAPSULE ORAL at 10:22

## 2019-09-24 RX ADMIN — Medication 10 ML: at 08:00

## 2019-09-24 NOTE — DISCHARGE SUMMARY
Internal Medicine Discharge Summary    NAME: Brando Garcia :  1980  MRN:  [id number] Arely Barboza MD    ADMITTED: 2019   DISCHARGED: 2019  4:00 PM    ADMITTING PHYSICIAN: Mayo Bang DO    CONSULTANT(S):   IP CONSULT TO INTERNAL MEDICINE  IP CONSULT TO GENERAL SURGERY  IP CONSULT TO GI     ADMITTING DIAGNOSIS:   Choledocholithiasis [K80.50]  Choledocholithiasis [K80.50]     Please see H&P for further details    DISCHARGE DIAGNOSES:   Active Hospital Problems    Diagnosis    Choledocholithiasis [K80.50]     Priority: High    IBS (irritable bowel syndrome) [K58.9]    Hyperlipidemia [E78.5]    Irritable bowel syndrome with diarrhea [K58.0]    Former smoker [S82.606]    Migraines [G43.909]    Pulmonary nodule RUL [R91.1]    Borderline abnormal TFTs [R94.6]    Anemia [D64.9]    GERD (gastroesophageal reflux disease) [K21.9]       BRIEF HISTORY OF PRESENT ILLNESS: Brando Garcia is a 37 y.o. male patient of Orvan Skiff, MD who  has a past medical history of Anemia, Borderline abnormal TFTs, GERD (gastroesophageal reflux disease), Hyperlipidemia, IBS (irritable bowel syndrome), and DAJUAN on CPAP. who originally had concerns including Chest Pain (beginning last night, midsternal chest pressure radiating into back) and Shortness of Breath. at presentation on 2019, and was found to have Choledocholithiasis [K80.50]  Choledocholithiasis [K80.50] after workup. Please see H&P for further details. HOSPITAL COURSE:   The patient presented to the hospital with the chief complaint of Chest Pain (beginning last night, midsternal chest pressure radiating into back) and Shortness of Breath. The patient was admitted to the hospital. CT of the abdomen revealed choledocholithiasis with dilation of the common bile duct. GI and surgery were consulted. Pain was managed while in the hospital, with complete resolution of symptoms.  MRCP shows there is filling defect in the distal common in the last 72 hours. WOUND ABSCESS  No results for input(s): WNDABS in the last 72 hours. Anaerobic cx  No results for input(s): LABANAE in the last 72 hours. CULTURE SURGICAL  No results for input(s): CXSURG in the last 72 hours. URINE CULTURE  No results for input(s): LABURIN in the last 72 hours. No results for input(s): ORG in the last 72 hours. MISC. SENDOUT  No results for input(s): MREF in the last 72 hours. STREP PNEUMONIA AG URINE  No results for input(s): STREPNEUMAGU in the last 72 hours. LEGIONELLA AG URINE  No results for input(s): LEGUR in the last 72 hours. No results for input(s): [address] in the last 72 hours. DISPOSITION:  The patient's condition is fair. The patient is being discharged to home    DISCHARGE MEDICATIONS:    Molly Burrowston   Home Medication Instructions BIC:[id number]    Printed on:09/26/19 1242   Medication Information                      dicyclomine (BENTYL) 10 MG capsule  Take 1 capsule by mouth 4 times daily (before meals and nightly)             pantoprazole (PROTONIX) 40 MG tablet  Take 40 mg by mouth daily Instructed to take am of procedure                 Discharge Medication List as of 9/24/2019  3:49 PM        Discharge Medication List as of 9/24/2019  3:49 PM      STOP taking these medications       ferrous sulfate 325 (65 Fe) MG tablet Comments:   Reason for Stopping:         HYDROcodone-acetaminophen ([address]) 5-325 MG per tablet Comments:   Reason for Stopping:             Discharge Medication List as of 9/24/2019  3:49 PM          INTERNAL MEDICINE FOLLOW UP/INSTRUCTIONS:  · Follow-up with primary care physician as directed in discharge paperwork. · Please review results of imaging studies with PCP. · Follow-up with consultants as directed by them. · If recurrence or worsening of symptoms go to the ED or call primary care physician.   Preparing for this patient's discharge, including paperwork, orders, instructions, and meeting with patient did required 34 minutes. The patient was seen, examined, and discussed with Dr. Amelie Correia. Finesse Carrero PGY1  9/24/2019  10:28 AM    Addendum: I have personally participated in a face-to-face history and physical exam on the date of service with the patient. I have discussed the case with the resident. I also participated in medical decision making with the resident on the date of service and I agree with all of the pertinent clinical information unless indicated in my editing of the note. I have reviewed and edited the note above based on my findings during my history, exam, and decision making on the same day of service. Electronically signed by Jose Luis Riley DO on 9/26/2019 at 12:42 PM    I can be reached through [facility].

## 2019-09-24 NOTE — PLAN OF CARE
Problem:  Bowel/Gastric:  Goal: Bowel function will improve  Description  Bowel function will improve  9/24/2019 1032 by Brannon Lala RN  Outcome: Met This Shift  9/23/2019 2133 by Ashlyn Okeefe RN  Outcome: Met This Shift  Goal: Diagnostic test results will improve  Description  Diagnostic test results will improve  9/23/2019 2133 by Ashlyn Okeefe RN  Outcome: Met This Shift

## 2019-09-24 NOTE — PROGRESS NOTES
P Quality Flow/Interdisciplinary Rounds Progress Note        Quality Flow Rounds held on September 24, 2019    Disciplines Attending:  Bedside Nurse, ,  and Nursing Unit Leadership    Miranda Stephens was admitted on 9/22/2019  5:02 PM    Anticipated Discharge Date:  Expected Discharge Date: 09/24/19    Disposition:    Vikram Score:  Vikram Scale Score: 21    Readmission Risk              Risk of Unplanned Readmission:        9           Discussed patient goal for the day, patient clinical progression, and barriers to discharge.   The following Goal(s) of the Day/Commitment(s) have been identified:  Discharge [address] [place]  September 24, 2019

## 2019-09-24 NOTE — CONSULTS
Gastroenterology Consult Note   Vanesa HEATON NP-C with Hanna Villalobos M.D. Consult Note        Date of Service: 9/24/2019  Reason for Consult: choledocolithiasis  Requesting Physician: Zuly Desir    CHIEF COMPLAINT:  Epigastric pain    History Obtained From:  patient, electronic medical record    HISTORY OF PRESENT ILLNESS:       Miranda Stephens is a 37 y.o. male with significant past medical history of IBS, HLD, GERD, abnormal thyroid levels, and anemia admitted via ED for midsternal chest pressure, and epigastric pain. Pt reports to getting off work Sunday AM, and making breakfast that AM. Ate ham, cheesy eggs, biscuits, and gravy. States the epigastric pain started following that meal. At first wasn't pain, \"more like pressure\". States was sitting on the toilet when the \"pain hit hard, felt like the wind was knocked out of me\". Describes the pain as \"severe, stabbing, rated at a 8/10\". Reports the pain was wrapping around his right side into his right shoulder blade as well as into his back. Having severe diarrhea at that time, 7 times described as \"brown liquid, straight water\". Denies any melena or hematochezia. Reports he tried laying down, \"couldn't get comfortable\". States the pressure/ pain started to ease up and was better feeling for 30 minutes before the pain returned and increased in severity. Was having chilling sweats, didn't check temperature. Reports to feeling feverish. States there are sick co-workers with \"flu\" symptoms, that he has been around recently while at work. Slightly nauseated, but denies any vomiting episodes. Up until this point was in his normal state of health, with no prior epiodes of such. Good appetite recently, denies any recent weight loss. Normal bowel pattern for him is 2-3 soft brown stools daily. Last EGD several months ago with Dr. Lilli Leiva: \"GERD\". Last colon 6 months ago with Dr. Lilli Leiva: \"normal, no polyps\". States his maternal grandfather had colon CA.   Admission labs injection 2 mg, 2 mg, Intravenous, Q4H PRN  metronidazole (FLAGYL) 500 mg in NaCl 100 mL IVPB premix, 500 mg, Intravenous, Q8H  cefTRIAXone (ROCEPHIN) 1 g in dextrose 5 % 50 mL IVPB (vial-mate), 1 g, Intravenous, Q24H  dicyclomine (BENTYL) capsule 10 mg, 10 mg, Oral, 4x Daily AC & HS  pantoprazole (PROTONIX) tablet 40 mg, 40 mg, Oral, QAM AC    Allergies:  Patient has no known allergies. Social History:    Tobacco:  Pt quit 4 years ago; prior 1 PPD for 15 years  Alcohol:  Pt reports socail consumption; 2-3 drinks/ 2-3 times a week  Illicit Drugs: Pt denies    Family History:   Father & mother- alive, healthy  2 sisters & 1 brother- alive, healthy  1 daughter- alive, healthy  Maternal grandfather with colon CA    REVIEW OF SYSTEMS:    Aside from what was mentioned in the [address] and HPI, essentially unremarkable, all others negative. PHYSICAL EXAM:      Vitals:    /73   Pulse (!) 48   Temp 97.7 °F (36.5 °C) (Oral)   Resp 16   Ht 5' 11\" (1.803 m)   Wt 236 lb 6.4 oz (107.2 kg)   SpO2 98%   BMI 32.97 kg/m²       CONSTITUTIONAL:  awake, alert, cooperative, no apparent distress, and appears stated age  EYES:  pupils equal, round and reactive to light, sclera anicteric and conjunctiva normal  ENT:  normocephalic, oral pharynx with dry mucous membranes  NECK:  supple   LUNGS:   clear to auscultation bilaterally.   CARDIOVASCULAR:   regular rate and rhythm, no murmur noted; 2+ pulses; no edema  ABDOMEN:  , normal bowel sounds, soft, non-distended, non-tender  MUSCULOSKELETAL:  full range of motion noted  motor strength is 5 out of 5 all extremities bilaterally  NEUROLOGIC:  Mental Status Exam:  Level of Alertness:   awake  Orientation:   person, place, time  Motor Exam:  Motor exam is symmetrical 5 out of 5 all extremities bilaterally  SKIN:  normal skin color, texture, turgor    DATA:    CBC with Differential:    Lab Results   Component Value Date    WBC 5.6 09/24/2019    RBC 4.50 09/24/2019    HGB 12.7

## 2019-09-25 ENCOUNTER — PREP FOR PROCEDURE (OUTPATIENT)
Dept: GASTROENTEROLOGY | Age: 39
End: 2019-09-25

## 2019-09-25 DIAGNOSIS — R10.84 GENERALIZED ABDOMINAL PAIN: Primary | ICD-10-CM

## 2019-09-25 RX ORDER — SODIUM CHLORIDE, SODIUM LACTATE, POTASSIUM CHLORIDE, CALCIUM CHLORIDE 600; 310; 30; 20 MG/100ML; MG/100ML; MG/100ML; MG/100ML
1000 INJECTION, SOLUTION INTRAVENOUS CONTINUOUS
Status: CANCELLED | OUTPATIENT
Start: 2019-09-25

## 2019-09-25 RX ORDER — SODIUM CHLORIDE 0.9 % (FLUSH) 0.9 %
10 SYRINGE (ML) INJECTION EVERY 12 HOURS SCHEDULED
Status: CANCELLED | OUTPATIENT
Start: 2019-09-25

## 2019-09-25 RX ORDER — INDOMETHACIN 50 MG/1
50 SUPPOSITORY RECTAL
Status: CANCELLED | OUTPATIENT
Start: 2019-09-26

## 2019-09-25 RX ORDER — SODIUM CHLORIDE 9 MG/ML
INJECTION, SOLUTION INTRAVENOUS CONTINUOUS
Status: CANCELLED | OUTPATIENT
Start: 2019-09-25

## 2019-09-25 RX ORDER — FERROUS SULFATE 325(65) MG
325 TABLET ORAL
COMMUNITY
End: 2019-11-11

## 2019-09-27 ENCOUNTER — ANESTHESIA EVENT (OUTPATIENT)
Dept: ENDOSCOPY | Age: 39
DRG: 438 | End: 2019-09-27
Payer: COMMERCIAL

## 2019-09-27 ENCOUNTER — APPOINTMENT (OUTPATIENT)
Dept: GENERAL RADIOLOGY | Age: 39
DRG: 438 | End: 2019-09-27
Attending: INTERNAL MEDICINE
Payer: COMMERCIAL

## 2019-09-27 ENCOUNTER — ANESTHESIA (OUTPATIENT)
Dept: ENDOSCOPY | Age: 39
DRG: 438 | End: 2019-09-27
Payer: COMMERCIAL

## 2019-09-27 ENCOUNTER — HOSPITAL ENCOUNTER (INPATIENT)
Age: 39
LOS: 11 days | Discharge: HOME HEALTH | DRG: 438 | End: 2019-10-09
Attending: INTERNAL MEDICINE | Admitting: INTERNAL MEDICINE
Payer: COMMERCIAL

## 2019-09-27 VITALS — OXYGEN SATURATION: 91 % | DIASTOLIC BLOOD PRESSURE: 90 MMHG | SYSTOLIC BLOOD PRESSURE: 145 MMHG

## 2019-09-27 DIAGNOSIS — R18.8 PANCREATIC ASCITES: ICD-10-CM

## 2019-09-27 DIAGNOSIS — K91.89 POST-ERCP ACUTE PANCREATITIS: ICD-10-CM

## 2019-09-27 DIAGNOSIS — K80.50 CHOLEDOCHOLITHIASIS: ICD-10-CM

## 2019-09-27 DIAGNOSIS — R18.8 OTHER ASCITES: ICD-10-CM

## 2019-09-27 DIAGNOSIS — K86.3 PANCREATIC PSEUDOCYST: Primary | ICD-10-CM

## 2019-09-27 DIAGNOSIS — K85.90 POST-ERCP ACUTE PANCREATITIS: ICD-10-CM

## 2019-09-27 LAB
CA 19-9: 12 U/ML (ref 0–37)
INR BLD: 1
PROTHROMBIN TIME: 12 SEC (ref 9.3–12.4)

## 2019-09-27 PROCEDURE — 3700000001 HC ADD 15 MINUTES (ANESTHESIA): Performed by: INTERNAL MEDICINE

## 2019-09-27 PROCEDURE — 2580000003 HC RX 258: Performed by: NURSE ANESTHETIST, CERTIFIED REGISTERED

## 2019-09-27 PROCEDURE — 6360000002 HC RX W HCPCS

## 2019-09-27 PROCEDURE — C1769 GUIDE WIRE: HCPCS | Performed by: INTERNAL MEDICINE

## 2019-09-27 PROCEDURE — 0FC98ZZ EXTIRPATION OF MATTER FROM COMMON BILE DUCT, VIA NATURAL OR ARTIFICIAL OPENING ENDOSCOPIC: ICD-10-PCS | Performed by: INTERNAL MEDICINE

## 2019-09-27 PROCEDURE — BF101ZZ FLUOROSCOPY OF BILE DUCTS USING LOW OSMOLAR CONTRAST: ICD-10-PCS | Performed by: INTERNAL MEDICINE

## 2019-09-27 PROCEDURE — 6360000002 HC RX W HCPCS: Performed by: NURSE PRACTITIONER

## 2019-09-27 PROCEDURE — 3609015200 HC ERCP REMOVE CALCULI/DEBRIS BILIARY/PANCREAS DUCT: Performed by: INTERNAL MEDICINE

## 2019-09-27 PROCEDURE — 6360000002 HC RX W HCPCS: Performed by: NURSE ANESTHETIST, CERTIFIED REGISTERED

## 2019-09-27 PROCEDURE — 85610 PROTHROMBIN TIME: CPT

## 2019-09-27 PROCEDURE — C2625 STENT, NON-COR, TEM W/DEL SY: HCPCS | Performed by: INTERNAL MEDICINE

## 2019-09-27 PROCEDURE — 7100000010 HC PHASE II RECOVERY - FIRST 15 MIN: Performed by: INTERNAL MEDICINE

## 2019-09-27 PROCEDURE — G0378 HOSPITAL OBSERVATION PER HR: HCPCS

## 2019-09-27 PROCEDURE — 2720000010 HC SURG SUPPLY STERILE: Performed by: INTERNAL MEDICINE

## 2019-09-27 PROCEDURE — 7100000011 HC PHASE II RECOVERY - ADDTL 15 MIN: Performed by: INTERNAL MEDICINE

## 2019-09-27 PROCEDURE — 2500000003 HC RX 250 WO HCPCS: Performed by: NURSE ANESTHETIST, CERTIFIED REGISTERED

## 2019-09-27 PROCEDURE — 6370000000 HC RX 637 (ALT 250 FOR IP): Performed by: INTERNAL MEDICINE

## 2019-09-27 PROCEDURE — 2580000003 HC RX 258: Performed by: INTERNAL MEDICINE

## 2019-09-27 PROCEDURE — 2709999900 HC NON-CHARGEABLE SUPPLY: Performed by: INTERNAL MEDICINE

## 2019-09-27 PROCEDURE — 36415 COLL VENOUS BLD VENIPUNCTURE: CPT

## 2019-09-27 PROCEDURE — 3700000000 HC ANESTHESIA ATTENDED CARE: Performed by: INTERNAL MEDICINE

## 2019-09-27 PROCEDURE — 0F9D8ZZ DRAINAGE OF PANCREATIC DUCT, VIA NATURAL OR ARTIFICIAL OPENING ENDOSCOPIC: ICD-10-PCS | Performed by: INTERNAL MEDICINE

## 2019-09-27 PROCEDURE — 6360000002 HC RX W HCPCS: Performed by: INTERNAL MEDICINE

## 2019-09-27 PROCEDURE — 74330 X-RAY BILE/PANC ENDOSCOPY: CPT

## 2019-09-27 RX ORDER — 0.9 % SODIUM CHLORIDE 0.9 %
10 VIAL (ML) INJECTION PRN
Status: DISCONTINUED | OUTPATIENT
Start: 2019-09-27 | End: 2019-10-03

## 2019-09-27 RX ORDER — SODIUM CHLORIDE, SODIUM LACTATE, POTASSIUM CHLORIDE, CALCIUM CHLORIDE 600; 310; 30; 20 MG/100ML; MG/100ML; MG/100ML; MG/100ML
INJECTION, SOLUTION INTRAVENOUS CONTINUOUS PRN
Status: DISCONTINUED | OUTPATIENT
Start: 2019-09-27 | End: 2019-09-27 | Stop reason: SDUPTHER

## 2019-09-27 RX ORDER — BISACODYL 10 MG
10 SUPPOSITORY, RECTAL RECTAL DAILY
Status: COMPLETED | OUTPATIENT
Start: 2019-09-27 | End: 2019-09-28

## 2019-09-27 RX ORDER — PROPOFOL 10 MG/ML
INJECTION, EMULSION INTRAVENOUS PRN
Status: DISCONTINUED | OUTPATIENT
Start: 2019-09-27 | End: 2019-09-27 | Stop reason: SDUPTHER

## 2019-09-27 RX ORDER — PROMETHAZINE HYDROCHLORIDE 25 MG/ML
25 INJECTION, SOLUTION INTRAMUSCULAR; INTRAVENOUS EVERY 6 HOURS PRN
Status: DISCONTINUED | OUTPATIENT
Start: 2019-09-27 | End: 2019-10-09 | Stop reason: HOSPADM

## 2019-09-27 RX ORDER — SODIUM CHLORIDE 9 MG/ML
INJECTION, SOLUTION INTRAVENOUS CONTINUOUS
Status: DISCONTINUED | OUTPATIENT
Start: 2019-09-27 | End: 2019-09-30

## 2019-09-27 RX ORDER — SODIUM CHLORIDE 0.9 % (FLUSH) 0.9 %
10 SYRINGE (ML) INJECTION EVERY 12 HOURS SCHEDULED
Status: DISCONTINUED | OUTPATIENT
Start: 2019-09-27 | End: 2019-10-09 | Stop reason: HOSPADM

## 2019-09-27 RX ORDER — MEPERIDINE HYDROCHLORIDE 50 MG/ML
50 INJECTION INTRAMUSCULAR; INTRAVENOUS; SUBCUTANEOUS EVERY 4 HOURS PRN
Status: DISCONTINUED | OUTPATIENT
Start: 2019-09-27 | End: 2019-09-28

## 2019-09-27 RX ORDER — MORPHINE SULFATE 2 MG/ML
2 INJECTION, SOLUTION INTRAMUSCULAR; INTRAVENOUS EVERY 4 HOURS PRN
Status: DISCONTINUED | OUTPATIENT
Start: 2019-09-27 | End: 2019-09-27

## 2019-09-27 RX ORDER — SODIUM CHLORIDE, SODIUM LACTATE, POTASSIUM CHLORIDE, CALCIUM CHLORIDE 600; 310; 30; 20 MG/100ML; MG/100ML; MG/100ML; MG/100ML
1000 INJECTION, SOLUTION INTRAVENOUS CONTINUOUS
Status: DISCONTINUED | OUTPATIENT
Start: 2019-09-27 | End: 2019-09-30

## 2019-09-27 RX ORDER — ONDANSETRON 2 MG/ML
4 INJECTION INTRAMUSCULAR; INTRAVENOUS EVERY 6 HOURS PRN
Status: DISCONTINUED | OUTPATIENT
Start: 2019-09-27 | End: 2019-09-30

## 2019-09-27 RX ADMIN — PROPOFOL 50 MG: 10 INJECTION, EMULSION INTRAVENOUS at 13:53

## 2019-09-27 RX ADMIN — MORPHINE SULFATE 2 MG: 2 INJECTION, SOLUTION INTRAMUSCULAR; INTRAVENOUS at 18:48

## 2019-09-27 RX ADMIN — GLUCAGON HYDROCHLORIDE 0.25 MG: KIT at 14:00

## 2019-09-27 RX ADMIN — PROPOFOL 50 MG: 10 INJECTION, EMULSION INTRAVENOUS at 14:01

## 2019-09-27 RX ADMIN — SODIUM CHLORIDE 3 G: 900 INJECTION INTRAVENOUS at 13:30

## 2019-09-27 RX ADMIN — GLUCAGON HYDROCHLORIDE 0.25 MG: KIT at 14:01

## 2019-09-27 RX ADMIN — MEPERIDINE HYDROCHLORIDE 50 MG: 50 INJECTION INTRAMUSCULAR; INTRAVENOUS; SUBCUTANEOUS at 22:44

## 2019-09-27 RX ADMIN — SODIUM CHLORIDE, POTASSIUM CHLORIDE, SODIUM LACTATE AND CALCIUM CHLORIDE: 600; 310; 30; 20 INJECTION, SOLUTION INTRAVENOUS at 13:39

## 2019-09-27 RX ADMIN — ONDANSETRON 4 MG: 2 INJECTION INTRAMUSCULAR; INTRAVENOUS at 20:03

## 2019-09-27 RX ADMIN — HYDROMORPHONE HYDROCHLORIDE 0.5 MG: 1 INJECTION, SOLUTION INTRAMUSCULAR; INTRAVENOUS; SUBCUTANEOUS at 16:25

## 2019-09-27 RX ADMIN — SODIUM CHLORIDE: 9 INJECTION, SOLUTION INTRAVENOUS at 17:50

## 2019-09-27 RX ADMIN — PROPOFOL 50 MG: 10 INJECTION, EMULSION INTRAVENOUS at 14:06

## 2019-09-27 RX ADMIN — PROMETHAZINE HYDROCHLORIDE 25 MG: 25 INJECTION INTRAMUSCULAR; INTRAVENOUS at 22:42

## 2019-09-27 RX ADMIN — PROPOFOL 150 MG: 10 INJECTION, EMULSION INTRAVENOUS at 13:47

## 2019-09-27 RX ADMIN — INDOMETHACIN 100 MG: 50 SUPPOSITORY RECTAL at 13:29

## 2019-09-27 RX ADMIN — PROPOFOL 50 MG: 10 INJECTION, EMULSION INTRAVENOUS at 13:57

## 2019-09-27 RX ADMIN — PROPOFOL 50 MG: 10 INJECTION, EMULSION INTRAVENOUS at 13:50

## 2019-09-27 ASSESSMENT — PAIN SCALES - GENERAL
PAINLEVEL_OUTOF10: 9
PAINLEVEL_OUTOF10: 7
PAINLEVEL_OUTOF10: 0
PAINLEVEL_OUTOF10: 7
PAINLEVEL_OUTOF10: 0
PAINLEVEL_OUTOF10: 10
PAINLEVEL_OUTOF10: 0
PAINLEVEL_OUTOF10: 7
PAINLEVEL_OUTOF10: 10
PAINLEVEL_OUTOF10: 0

## 2019-09-27 ASSESSMENT — PAIN DESCRIPTION - ORIENTATION
ORIENTATION: MID;UPPER

## 2019-09-27 ASSESSMENT — PAIN - FUNCTIONAL ASSESSMENT
PAIN_FUNCTIONAL_ASSESSMENT: PREVENTS OR INTERFERES SOME ACTIVE ACTIVITIES AND ADLS
PAIN_FUNCTIONAL_ASSESSMENT: PREVENTS OR INTERFERES SOME ACTIVE ACTIVITIES AND ADLS
PAIN_FUNCTIONAL_ASSESSMENT: 0-10

## 2019-09-27 ASSESSMENT — PAIN DESCRIPTION - LOCATION
LOCATION: ABDOMEN

## 2019-09-27 ASSESSMENT — PAIN DESCRIPTION - PAIN TYPE
TYPE: ACUTE PAIN

## 2019-09-27 ASSESSMENT — PAIN DESCRIPTION - ONSET
ONSET: ON-GOING
ONSET: ON-GOING

## 2019-09-27 ASSESSMENT — PAIN DESCRIPTION - PROGRESSION
CLINICAL_PROGRESSION: GRADUALLY WORSENING
CLINICAL_PROGRESSION: GRADUALLY WORSENING

## 2019-09-27 ASSESSMENT — PAIN DESCRIPTION - FREQUENCY
FREQUENCY: CONTINUOUS
FREQUENCY: CONTINUOUS

## 2019-09-27 ASSESSMENT — PAIN DESCRIPTION - DESCRIPTORS
DESCRIPTORS: ACHING;CONSTANT;DISCOMFORT
DESCRIPTORS: ACHING;DISCOMFORT
DESCRIPTORS: ACHING;DISCOMFORT

## 2019-09-27 NOTE — ANESTHESIA PRE PROCEDURE
discussed with patient and spouse. Plan discussed with CRNA.                   Prabhakar Reynaga MD   9/27/2019

## 2019-09-28 PROBLEM — K80.51: Status: ACTIVE | Noted: 2019-09-28

## 2019-09-28 LAB
ALBUMIN SERPL-MCNC: 4.8 G/DL (ref 3.5–5.2)
ALP BLD-CCNC: 67 U/L (ref 40–129)
ALT SERPL-CCNC: 70 U/L (ref 0–40)
AMYLASE: 372 U/L (ref 20–100)
AST SERPL-CCNC: 31 U/L (ref 0–39)
BILIRUB SERPL-MCNC: 0.8 MG/DL (ref 0–1.2)
BILIRUBIN DIRECT: 0.2 MG/DL (ref 0–0.3)
BILIRUBIN, INDIRECT: 0.6 MG/DL (ref 0–1)
HCT VFR BLD CALC: 47.9 % (ref 37–54)
HEMOGLOBIN: 15.3 G/DL (ref 12.5–16.5)
LIPASE: 1376 U/L (ref 13–60)
MCH RBC QN AUTO: 28.1 PG (ref 26–35)
MCHC RBC AUTO-ENTMCNC: 31.9 % (ref 32–34.5)
MCV RBC AUTO: 87.9 FL (ref 80–99.9)
PDW BLD-RTO: 15.1 FL (ref 11.5–15)
PLATELET # BLD: 261 E9/L (ref 130–450)
PMV BLD AUTO: 10.5 FL (ref 7–12)
RBC # BLD: 5.45 E12/L (ref 3.8–5.8)
TOTAL PROTEIN: 7.7 G/DL (ref 6.4–8.3)
WBC # BLD: 16.3 E9/L (ref 4.5–11.5)

## 2019-09-28 PROCEDURE — 85027 COMPLETE CBC AUTOMATED: CPT

## 2019-09-28 PROCEDURE — 96375 TX/PRO/DX INJ NEW DRUG ADDON: CPT

## 2019-09-28 PROCEDURE — 6370000000 HC RX 637 (ALT 250 FOR IP): Performed by: INTERNAL MEDICINE

## 2019-09-28 PROCEDURE — 1200000000 HC SEMI PRIVATE

## 2019-09-28 PROCEDURE — 80076 HEPATIC FUNCTION PANEL: CPT

## 2019-09-28 PROCEDURE — 6360000002 HC RX W HCPCS: Performed by: NURSE PRACTITIONER

## 2019-09-28 PROCEDURE — 2580000003 HC RX 258: Performed by: INTERNAL MEDICINE

## 2019-09-28 PROCEDURE — 2580000003 HC RX 258: Performed by: NURSE PRACTITIONER

## 2019-09-28 PROCEDURE — 96376 TX/PRO/DX INJ SAME DRUG ADON: CPT

## 2019-09-28 PROCEDURE — 83690 ASSAY OF LIPASE: CPT

## 2019-09-28 PROCEDURE — C9113 INJ PANTOPRAZOLE SODIUM, VIA: HCPCS | Performed by: INTERNAL MEDICINE

## 2019-09-28 PROCEDURE — 96374 THER/PROPH/DIAG INJ IV PUSH: CPT

## 2019-09-28 PROCEDURE — G0378 HOSPITAL OBSERVATION PER HR: HCPCS

## 2019-09-28 PROCEDURE — 36415 COLL VENOUS BLD VENIPUNCTURE: CPT

## 2019-09-28 PROCEDURE — C9113 INJ PANTOPRAZOLE SODIUM, VIA: HCPCS | Performed by: NURSE PRACTITIONER

## 2019-09-28 PROCEDURE — 82150 ASSAY OF AMYLASE: CPT

## 2019-09-28 PROCEDURE — 6360000002 HC RX W HCPCS: Performed by: INTERNAL MEDICINE

## 2019-09-28 RX ORDER — PANTOPRAZOLE SODIUM 40 MG/10ML
40 INJECTION, POWDER, LYOPHILIZED, FOR SOLUTION INTRAVENOUS DAILY
Status: DISCONTINUED | OUTPATIENT
Start: 2019-09-28 | End: 2019-09-28 | Stop reason: ALTCHOICE

## 2019-09-28 RX ORDER — MORPHINE SULFATE 2 MG/ML
2 INJECTION, SOLUTION INTRAMUSCULAR; INTRAVENOUS EVERY 4 HOURS PRN
Status: DISCONTINUED | OUTPATIENT
Start: 2019-09-28 | End: 2019-09-29

## 2019-09-28 RX ORDER — 0.9 % SODIUM CHLORIDE 0.9 %
10 VIAL (ML) INJECTION DAILY
Status: DISCONTINUED | OUTPATIENT
Start: 2019-09-28 | End: 2019-09-28 | Stop reason: ALTCHOICE

## 2019-09-28 RX ORDER — ACETAMINOPHEN 325 MG/1
650 TABLET ORAL EVERY 4 HOURS PRN
Status: DISCONTINUED | OUTPATIENT
Start: 2019-09-28 | End: 2019-10-07

## 2019-09-28 RX ORDER — MORPHINE SULFATE 4 MG/ML
4 INJECTION, SOLUTION INTRAMUSCULAR; INTRAVENOUS EVERY 4 HOURS PRN
Status: COMPLETED | OUTPATIENT
Start: 2019-09-28 | End: 2019-09-28

## 2019-09-28 RX ADMIN — PANTOPRAZOLE SODIUM 40 MG: 40 INJECTION, POWDER, LYOPHILIZED, FOR SOLUTION INTRAVENOUS at 13:58

## 2019-09-28 RX ADMIN — MORPHINE SULFATE 2 MG: 2 INJECTION, SOLUTION INTRAMUSCULAR; INTRAVENOUS at 16:33

## 2019-09-28 RX ADMIN — Medication 10 ML: at 13:58

## 2019-09-28 RX ADMIN — MORPHINE SULFATE 2 MG: 2 INJECTION, SOLUTION INTRAMUSCULAR; INTRAVENOUS at 20:33

## 2019-09-28 RX ADMIN — PANTOPRAZOLE SODIUM 8 MG/HR: 40 INJECTION, POWDER, FOR SOLUTION INTRAVENOUS at 18:10

## 2019-09-28 RX ADMIN — MEPERIDINE HYDROCHLORIDE 50 MG: 50 INJECTION INTRAMUSCULAR; INTRAVENOUS; SUBCUTANEOUS at 03:13

## 2019-09-28 RX ADMIN — BISACODYL 10 MG: 10 SUPPOSITORY RECTAL at 03:41

## 2019-09-28 RX ADMIN — PROMETHAZINE HYDROCHLORIDE 25 MG: 25 INJECTION INTRAMUSCULAR; INTRAVENOUS at 22:18

## 2019-09-28 RX ADMIN — SODIUM CHLORIDE: 9 INJECTION, SOLUTION INTRAVENOUS at 18:10

## 2019-09-28 RX ADMIN — MEPERIDINE HYDROCHLORIDE 50 MG: 50 INJECTION INTRAMUSCULAR; INTRAVENOUS; SUBCUTANEOUS at 07:59

## 2019-09-28 RX ADMIN — PROMETHAZINE HYDROCHLORIDE 25 MG: 25 INJECTION INTRAMUSCULAR; INTRAVENOUS at 06:21

## 2019-09-28 RX ADMIN — ONDANSETRON 4 MG: 2 INJECTION INTRAMUSCULAR; INTRAVENOUS at 03:13

## 2019-09-28 RX ADMIN — ONDANSETRON 4 MG: 2 INJECTION INTRAMUSCULAR; INTRAVENOUS at 12:02

## 2019-09-28 RX ADMIN — MORPHINE SULFATE 4 MG: 4 INJECTION, SOLUTION INTRAMUSCULAR; INTRAVENOUS at 12:02

## 2019-09-28 ASSESSMENT — PAIN DESCRIPTION - PAIN TYPE
TYPE: ACUTE PAIN

## 2019-09-28 ASSESSMENT — PAIN DESCRIPTION - LOCATION
LOCATION: ABDOMEN

## 2019-09-28 ASSESSMENT — PAIN DESCRIPTION - FREQUENCY
FREQUENCY: CONTINUOUS

## 2019-09-28 ASSESSMENT — PAIN SCALES - GENERAL
PAINLEVEL_OUTOF10: 0
PAINLEVEL_OUTOF10: 7
PAINLEVEL_OUTOF10: 8
PAINLEVEL_OUTOF10: 0
PAINLEVEL_OUTOF10: 0
PAINLEVEL_OUTOF10: 6
PAINLEVEL_OUTOF10: 6

## 2019-09-28 ASSESSMENT — PAIN - FUNCTIONAL ASSESSMENT
PAIN_FUNCTIONAL_ASSESSMENT: ACTIVITIES ARE NOT PREVENTED
PAIN_FUNCTIONAL_ASSESSMENT: PREVENTS OR INTERFERES SOME ACTIVE ACTIVITIES AND ADLS
PAIN_FUNCTIONAL_ASSESSMENT: ACTIVITIES ARE NOT PREVENTED

## 2019-09-28 ASSESSMENT — PAIN DESCRIPTION - ORIENTATION
ORIENTATION: MID;UPPER
ORIENTATION: MID
ORIENTATION: MID;UPPER
ORIENTATION: MID
ORIENTATION: MID

## 2019-09-28 ASSESSMENT — PAIN DESCRIPTION - DESCRIPTORS
DESCRIPTORS: ACHING
DESCRIPTORS: ACHING;DISCOMFORT
DESCRIPTORS: ACHING;CONSTANT;DISCOMFORT

## 2019-09-28 ASSESSMENT — PAIN DESCRIPTION - PROGRESSION
CLINICAL_PROGRESSION: GRADUALLY WORSENING
CLINICAL_PROGRESSION: RAPIDLY IMPROVING
CLINICAL_PROGRESSION: GRADUALLY WORSENING

## 2019-09-28 ASSESSMENT — PAIN DESCRIPTION - ONSET
ONSET: ON-GOING

## 2019-09-29 ENCOUNTER — APPOINTMENT (OUTPATIENT)
Dept: GENERAL RADIOLOGY | Age: 39
DRG: 438 | End: 2019-09-29
Attending: INTERNAL MEDICINE
Payer: COMMERCIAL

## 2019-09-29 LAB
ALBUMIN SERPL-MCNC: 3.9 G/DL (ref 3.5–5.2)
ALP BLD-CCNC: 50 U/L (ref 40–129)
ALT SERPL-CCNC: 41 U/L (ref 0–40)
AMYLASE: 324 U/L (ref 20–100)
ANION GAP SERPL CALCULATED.3IONS-SCNC: 16 MMOL/L (ref 7–16)
AST SERPL-CCNC: 17 U/L (ref 0–39)
BILIRUB SERPL-MCNC: 0.9 MG/DL (ref 0–1.2)
BUN BLDV-MCNC: 8 MG/DL (ref 6–20)
CALCIUM SERPL-MCNC: 7.7 MG/DL (ref 8.6–10.2)
CHLORIDE BLD-SCNC: 99 MMOL/L (ref 98–107)
CO2: 21 MMOL/L (ref 22–29)
CREAT SERPL-MCNC: 0.9 MG/DL (ref 0.7–1.2)
GFR AFRICAN AMERICAN: >60
GFR NON-AFRICAN AMERICAN: >60 ML/MIN/1.73
GLUCOSE BLD-MCNC: 134 MG/DL (ref 74–99)
HCT VFR BLD CALC: 47.4 % (ref 37–54)
HCT VFR BLD CALC: 47.6 % (ref 37–54)
HEMOGLOBIN: 15.6 G/DL (ref 12.5–16.5)
HEMOGLOBIN: 15.6 G/DL (ref 12.5–16.5)
LIPASE: 609 U/L (ref 13–60)
MCH RBC QN AUTO: 28.6 PG (ref 26–35)
MCHC RBC AUTO-ENTMCNC: 32.8 % (ref 32–34.5)
MCV RBC AUTO: 87.2 FL (ref 80–99.9)
PDW BLD-RTO: 15.8 FL (ref 11.5–15)
PLATELET # BLD: 205 E9/L (ref 130–450)
PMV BLD AUTO: 10.5 FL (ref 7–12)
POTASSIUM REFLEX MAGNESIUM: 3.6 MMOL/L (ref 3.5–5)
RBC # BLD: 5.46 E12/L (ref 3.8–5.8)
SODIUM BLD-SCNC: 136 MMOL/L (ref 132–146)
TOTAL PROTEIN: 6.7 G/DL (ref 6.4–8.3)
WBC # BLD: 19.4 E9/L (ref 4.5–11.5)

## 2019-09-29 PROCEDURE — C9113 INJ PANTOPRAZOLE SODIUM, VIA: HCPCS | Performed by: INTERNAL MEDICINE

## 2019-09-29 PROCEDURE — 83690 ASSAY OF LIPASE: CPT

## 2019-09-29 PROCEDURE — 36415 COLL VENOUS BLD VENIPUNCTURE: CPT

## 2019-09-29 PROCEDURE — 2580000003 HC RX 258: Performed by: INTERNAL MEDICINE

## 2019-09-29 PROCEDURE — 6360000002 HC RX W HCPCS: Performed by: INTERNAL MEDICINE

## 2019-09-29 PROCEDURE — 6370000000 HC RX 637 (ALT 250 FOR IP): Performed by: CLINICAL NURSE SPECIALIST

## 2019-09-29 PROCEDURE — 74018 RADEX ABDOMEN 1 VIEW: CPT

## 2019-09-29 PROCEDURE — 85018 HEMOGLOBIN: CPT

## 2019-09-29 PROCEDURE — 85027 COMPLETE CBC AUTOMATED: CPT

## 2019-09-29 PROCEDURE — 2580000003 HC RX 258: Performed by: CLINICAL NURSE SPECIALIST

## 2019-09-29 PROCEDURE — 6360000002 HC RX W HCPCS: Performed by: CLINICAL NURSE SPECIALIST

## 2019-09-29 PROCEDURE — 85014 HEMATOCRIT: CPT

## 2019-09-29 PROCEDURE — 87040 BLOOD CULTURE FOR BACTERIA: CPT

## 2019-09-29 PROCEDURE — 2060000000 HC ICU INTERMEDIATE R&B

## 2019-09-29 PROCEDURE — 71045 X-RAY EXAM CHEST 1 VIEW: CPT

## 2019-09-29 PROCEDURE — 82150 ASSAY OF AMYLASE: CPT

## 2019-09-29 PROCEDURE — 6360000002 HC RX W HCPCS: Performed by: NURSE PRACTITIONER

## 2019-09-29 PROCEDURE — 80053 COMPREHEN METABOLIC PANEL: CPT

## 2019-09-29 RX ORDER — HYDROCODONE BITARTRATE AND ACETAMINOPHEN 7.5; 325 MG/1; MG/1
1 TABLET ORAL EVERY 6 HOURS PRN
Status: DISCONTINUED | OUTPATIENT
Start: 2019-09-29 | End: 2019-09-30

## 2019-09-29 RX ORDER — BISACODYL 10 MG
10 SUPPOSITORY, RECTAL RECTAL ONCE
Status: COMPLETED | OUTPATIENT
Start: 2019-09-29 | End: 2019-09-29

## 2019-09-29 RX ORDER — METOCLOPRAMIDE HYDROCHLORIDE 5 MG/ML
5 INJECTION INTRAMUSCULAR; INTRAVENOUS 3 TIMES DAILY
Status: DISCONTINUED | OUTPATIENT
Start: 2019-09-29 | End: 2019-10-01

## 2019-09-29 RX ORDER — PROMETHAZINE HYDROCHLORIDE 25 MG/ML
12.5 INJECTION, SOLUTION INTRAMUSCULAR; INTRAVENOUS ONCE
Status: COMPLETED | OUTPATIENT
Start: 2019-09-29 | End: 2019-09-29

## 2019-09-29 RX ADMIN — METOCLOPRAMIDE 5 MG: 5 INJECTION, SOLUTION INTRAMUSCULAR; INTRAVENOUS at 14:02

## 2019-09-29 RX ADMIN — HYDROMORPHONE HYDROCHLORIDE 0.5 MG: 1 INJECTION, SOLUTION INTRAMUSCULAR; INTRAVENOUS; SUBCUTANEOUS at 13:56

## 2019-09-29 RX ADMIN — Medication 10 ML: at 04:37

## 2019-09-29 RX ADMIN — SODIUM CHLORIDE: 9 INJECTION, SOLUTION INTRAVENOUS at 16:03

## 2019-09-29 RX ADMIN — MEROPENEM 1 G: 1 INJECTION, POWDER, FOR SOLUTION INTRAVENOUS at 17:26

## 2019-09-29 RX ADMIN — MORPHINE SULFATE 2 MG: 2 INJECTION, SOLUTION INTRAMUSCULAR; INTRAVENOUS at 04:36

## 2019-09-29 RX ADMIN — HYDROMORPHONE HYDROCHLORIDE 0.5 MG: 1 INJECTION, SOLUTION INTRAMUSCULAR; INTRAVENOUS; SUBCUTANEOUS at 18:13

## 2019-09-29 RX ADMIN — PROMETHAZINE HYDROCHLORIDE 12.5 MG: 25 INJECTION INTRAMUSCULAR; INTRAVENOUS at 21:37

## 2019-09-29 RX ADMIN — HYDROMORPHONE HYDROCHLORIDE 0.5 MG: 1 INJECTION, SOLUTION INTRAMUSCULAR; INTRAVENOUS; SUBCUTANEOUS at 09:49

## 2019-09-29 RX ADMIN — MEROPENEM 1 G: 1 INJECTION, POWDER, FOR SOLUTION INTRAVENOUS at 10:27

## 2019-09-29 RX ADMIN — Medication 10 ML: at 04:24

## 2019-09-29 RX ADMIN — Medication 10 ML: at 21:50

## 2019-09-29 RX ADMIN — HYDROMORPHONE HYDROCHLORIDE 0.5 MG: 1 INJECTION, SOLUTION INTRAMUSCULAR; INTRAVENOUS; SUBCUTANEOUS at 21:38

## 2019-09-29 RX ADMIN — PANTOPRAZOLE SODIUM 8 MG/HR: 40 INJECTION, POWDER, FOR SOLUTION INTRAVENOUS at 23:12

## 2019-09-29 RX ADMIN — PROMETHAZINE HYDROCHLORIDE 25 MG: 25 INJECTION INTRAMUSCULAR; INTRAVENOUS at 10:25

## 2019-09-29 RX ADMIN — ONDANSETRON 4 MG: 2 INJECTION INTRAMUSCULAR; INTRAVENOUS at 18:13

## 2019-09-29 RX ADMIN — METOCLOPRAMIDE 5 MG: 5 INJECTION, SOLUTION INTRAMUSCULAR; INTRAVENOUS at 21:38

## 2019-09-29 RX ADMIN — PANTOPRAZOLE SODIUM 8 MG/HR: 40 INJECTION, POWDER, FOR SOLUTION INTRAVENOUS at 00:54

## 2019-09-29 RX ADMIN — PANTOPRAZOLE SODIUM 8 MG/HR: 40 INJECTION, POWDER, FOR SOLUTION INTRAVENOUS at 10:31

## 2019-09-29 RX ADMIN — MORPHINE SULFATE 2 MG: 2 INJECTION, SOLUTION INTRAMUSCULAR; INTRAVENOUS at 00:33

## 2019-09-29 RX ADMIN — BISACODYL 10 MG: 10 SUPPOSITORY RECTAL at 13:42

## 2019-09-29 RX ADMIN — ONDANSETRON 4 MG: 2 INJECTION INTRAMUSCULAR; INTRAVENOUS at 00:38

## 2019-09-29 RX ADMIN — ONDANSETRON 4 MG: 2 INJECTION INTRAMUSCULAR; INTRAVENOUS at 07:39

## 2019-09-29 RX ADMIN — SODIUM CHLORIDE: 9 INJECTION, SOLUTION INTRAVENOUS at 04:54

## 2019-09-29 RX ADMIN — PROMETHAZINE HYDROCHLORIDE 25 MG: 25 INJECTION INTRAMUSCULAR; INTRAVENOUS at 04:22

## 2019-09-29 ASSESSMENT — PAIN - FUNCTIONAL ASSESSMENT
PAIN_FUNCTIONAL_ASSESSMENT: PREVENTS OR INTERFERES SOME ACTIVE ACTIVITIES AND ADLS
PAIN_FUNCTIONAL_ASSESSMENT: PREVENTS OR INTERFERES WITH ALL ACTIVE AND SOME PASSIVE ACTIVITIES
PAIN_FUNCTIONAL_ASSESSMENT: ACTIVITIES ARE NOT PREVENTED
PAIN_FUNCTIONAL_ASSESSMENT: PREVENTS OR INTERFERES SOME ACTIVE ACTIVITIES AND ADLS
PAIN_FUNCTIONAL_ASSESSMENT: ACTIVITIES ARE NOT PREVENTED
PAIN_FUNCTIONAL_ASSESSMENT: PREVENTS OR INTERFERES SOME ACTIVE ACTIVITIES AND ADLS

## 2019-09-29 ASSESSMENT — PAIN DESCRIPTION - FREQUENCY
FREQUENCY: CONTINUOUS

## 2019-09-29 ASSESSMENT — PAIN DESCRIPTION - ONSET
ONSET: ON-GOING

## 2019-09-29 ASSESSMENT — PAIN DESCRIPTION - ORIENTATION
ORIENTATION: MID;UPPER
ORIENTATION: LOWER;MID
ORIENTATION: MID;UPPER
ORIENTATION: MID

## 2019-09-29 ASSESSMENT — PAIN SCALES - GENERAL
PAINLEVEL_OUTOF10: 8
PAINLEVEL_OUTOF10: 6
PAINLEVEL_OUTOF10: 8
PAINLEVEL_OUTOF10: 8
PAINLEVEL_OUTOF10: 5
PAINLEVEL_OUTOF10: 6
PAINLEVEL_OUTOF10: 6
PAINLEVEL_OUTOF10: 8
PAINLEVEL_OUTOF10: 9
PAINLEVEL_OUTOF10: 6

## 2019-09-29 ASSESSMENT — PAIN DESCRIPTION - PAIN TYPE
TYPE: ACUTE PAIN

## 2019-09-29 ASSESSMENT — PAIN DESCRIPTION - DESCRIPTORS
DESCRIPTORS: SHARP;DISCOMFORT
DESCRIPTORS: ACHING;DISCOMFORT;CRAMPING
DESCRIPTORS: ACHING;CRAMPING
DESCRIPTORS: DISCOMFORT;SHARP
DESCRIPTORS: ACHING;CONSTANT;DISCOMFORT
DESCRIPTORS: DISCOMFORT;SHARP

## 2019-09-29 ASSESSMENT — PAIN DESCRIPTION - LOCATION
LOCATION: ABDOMEN

## 2019-09-29 ASSESSMENT — PAIN DESCRIPTION - PROGRESSION
CLINICAL_PROGRESSION: GRADUALLY WORSENING
CLINICAL_PROGRESSION: NOT CHANGED
CLINICAL_PROGRESSION: NOT CHANGED

## 2019-09-29 ASSESSMENT — PAIN DESCRIPTION - DIRECTION: RADIATING_TOWARDS: BACK

## 2019-09-30 ENCOUNTER — APPOINTMENT (OUTPATIENT)
Dept: CT IMAGING | Age: 39
DRG: 438 | End: 2019-09-30
Attending: INTERNAL MEDICINE
Payer: COMMERCIAL

## 2019-09-30 ENCOUNTER — APPOINTMENT (OUTPATIENT)
Dept: GENERAL RADIOLOGY | Age: 39
DRG: 438 | End: 2019-09-30
Attending: INTERNAL MEDICINE
Payer: COMMERCIAL

## 2019-09-30 PROBLEM — K85.90 POST-ERCP ACUTE PANCREATITIS: Status: ACTIVE | Noted: 2019-09-30

## 2019-09-30 PROBLEM — K91.89 POST-ERCP ACUTE PANCREATITIS: Status: ACTIVE | Noted: 2019-09-30

## 2019-09-30 LAB
AMYLASE: 158 U/L (ref 20–100)
ANISOCYTOSIS: ABNORMAL
BASOPHILS ABSOLUTE: 0.04 E9/L (ref 0–0.2)
BASOPHILS RELATIVE PERCENT: 0.2 % (ref 0–2)
BURR CELLS: ABNORMAL
EOSINOPHILS ABSOLUTE: 0 E9/L (ref 0.05–0.5)
EOSINOPHILS RELATIVE PERCENT: 0 % (ref 0–6)
HCT VFR BLD CALC: 39.6 % (ref 37–54)
HCT VFR BLD CALC: 43.9 % (ref 37–54)
HCT VFR BLD CALC: 45.3 % (ref 37–54)
HEMOGLOBIN: 12.8 G/DL (ref 12.5–16.5)
HEMOGLOBIN: 14.1 G/DL (ref 12.5–16.5)
HEMOGLOBIN: 14.7 G/DL (ref 12.5–16.5)
IMMATURE GRANULOCYTES #: 0.06 E9/L
IMMATURE GRANULOCYTES %: 0.3 % (ref 0–5)
LIPASE: 192 U/L (ref 13–60)
LYMPHOCYTES ABSOLUTE: 0.89 E9/L (ref 1.5–4)
LYMPHOCYTES RELATIVE PERCENT: 4.5 % (ref 20–42)
MCH RBC QN AUTO: 28.3 PG (ref 26–35)
MCHC RBC AUTO-ENTMCNC: 31 % (ref 32–34.5)
MCV RBC AUTO: 91.2 FL (ref 80–99.9)
MONOCYTES ABSOLUTE: 1.78 E9/L (ref 0.1–0.95)
MONOCYTES RELATIVE PERCENT: 9 % (ref 2–12)
NEUTROPHILS ABSOLUTE: 17.04 E9/L (ref 1.8–7.3)
NEUTROPHILS RELATIVE PERCENT: 86 % (ref 43–80)
PDW BLD-RTO: 16.2 FL (ref 11.5–15)
PLATELET # BLD: 227 E9/L (ref 130–450)
PMV BLD AUTO: 11.5 FL (ref 7–12)
POIKILOCYTES: ABNORMAL
POLYCHROMASIA: ABNORMAL
RBC # BLD: 5.2 E12/L (ref 3.8–5.8)
WBC # BLD: 19.8 E9/L (ref 4.5–11.5)

## 2019-09-30 PROCEDURE — 2580000003 HC RX 258: Performed by: RADIOLOGY

## 2019-09-30 PROCEDURE — 6370000000 HC RX 637 (ALT 250 FOR IP): Performed by: CLINICAL NURSE SPECIALIST

## 2019-09-30 PROCEDURE — 6360000002 HC RX W HCPCS: Performed by: INTERNAL MEDICINE

## 2019-09-30 PROCEDURE — 82150 ASSAY OF AMYLASE: CPT

## 2019-09-30 PROCEDURE — 6360000002 HC RX W HCPCS: Performed by: CLINICAL NURSE SPECIALIST

## 2019-09-30 PROCEDURE — 2580000003 HC RX 258: Performed by: CLINICAL NURSE SPECIALIST

## 2019-09-30 PROCEDURE — 94770 HC ETCO2 MONITOR DAILY: CPT

## 2019-09-30 PROCEDURE — 2060000000 HC ICU INTERMEDIATE R&B

## 2019-09-30 PROCEDURE — 74018 RADEX ABDOMEN 1 VIEW: CPT

## 2019-09-30 PROCEDURE — 2580000003 HC RX 258: Performed by: INTERNAL MEDICINE

## 2019-09-30 PROCEDURE — 2700000000 HC OXYGEN THERAPY PER DAY

## 2019-09-30 PROCEDURE — 85018 HEMOGLOBIN: CPT

## 2019-09-30 PROCEDURE — 36415 COLL VENOUS BLD VENIPUNCTURE: CPT

## 2019-09-30 PROCEDURE — 74178 CT ABD&PLV WO CNTR FLWD CNTR: CPT

## 2019-09-30 PROCEDURE — C9113 INJ PANTOPRAZOLE SODIUM, VIA: HCPCS | Performed by: INTERNAL MEDICINE

## 2019-09-30 PROCEDURE — 6360000004 HC RX CONTRAST MEDICATION: Performed by: RADIOLOGY

## 2019-09-30 PROCEDURE — 85025 COMPLETE CBC W/AUTO DIFF WBC: CPT

## 2019-09-30 PROCEDURE — 6370000000 HC RX 637 (ALT 250 FOR IP): Performed by: NURSE PRACTITIONER

## 2019-09-30 PROCEDURE — 85014 HEMATOCRIT: CPT

## 2019-09-30 PROCEDURE — 83690 ASSAY OF LIPASE: CPT

## 2019-09-30 RX ORDER — SODIUM CHLORIDE 0.9 % (FLUSH) 0.9 %
10 SYRINGE (ML) INJECTION PRN
Status: DISCONTINUED | OUTPATIENT
Start: 2019-09-30 | End: 2019-10-01 | Stop reason: SDUPTHER

## 2019-09-30 RX ORDER — SODIUM CHLORIDE, SODIUM LACTATE, POTASSIUM CHLORIDE, CALCIUM CHLORIDE 600; 310; 30; 20 MG/100ML; MG/100ML; MG/100ML; MG/100ML
INJECTION, SOLUTION INTRAVENOUS CONTINUOUS
Status: DISCONTINUED | OUTPATIENT
Start: 2019-09-30 | End: 2019-10-07

## 2019-09-30 RX ORDER — PROCHLORPERAZINE EDISYLATE 5 MG/ML
10 INJECTION INTRAMUSCULAR; INTRAVENOUS EVERY 6 HOURS PRN
Status: DISCONTINUED | OUTPATIENT
Start: 2019-09-30 | End: 2019-10-09 | Stop reason: HOSPADM

## 2019-09-30 RX ORDER — NALOXONE HYDROCHLORIDE 0.4 MG/ML
0.4 INJECTION, SOLUTION INTRAMUSCULAR; INTRAVENOUS; SUBCUTANEOUS PRN
Status: DISCONTINUED | OUTPATIENT
Start: 2019-09-30 | End: 2019-10-04

## 2019-09-30 RX ADMIN — IOPAMIDOL 110 ML: 755 INJECTION, SOLUTION INTRAVENOUS at 12:12

## 2019-09-30 RX ADMIN — SODIUM CHLORIDE, POTASSIUM CHLORIDE, SODIUM LACTATE AND CALCIUM CHLORIDE: 600; 310; 30; 20 INJECTION, SOLUTION INTRAVENOUS at 08:43

## 2019-09-30 RX ADMIN — Medication 10 ML: at 02:15

## 2019-09-30 RX ADMIN — HYDROMORPHONE HYDROCHLORIDE 0.5 MG: 1 INJECTION, SOLUTION INTRAMUSCULAR; INTRAVENOUS; SUBCUTANEOUS at 16:10

## 2019-09-30 RX ADMIN — Medication 10 ML: at 08:44

## 2019-09-30 RX ADMIN — SODIUM CHLORIDE: 9 INJECTION, SOLUTION INTRAVENOUS at 01:40

## 2019-09-30 RX ADMIN — IOHEXOL 50 ML: 240 INJECTION, SOLUTION INTRATHECAL; INTRAVASCULAR; INTRAVENOUS; ORAL at 12:07

## 2019-09-30 RX ADMIN — METOCLOPRAMIDE 5 MG: 5 INJECTION, SOLUTION INTRAMUSCULAR; INTRAVENOUS at 20:38

## 2019-09-30 RX ADMIN — METOCLOPRAMIDE 5 MG: 5 INJECTION, SOLUTION INTRAMUSCULAR; INTRAVENOUS at 13:30

## 2019-09-30 RX ADMIN — Medication 10 ML: at 12:12

## 2019-09-30 RX ADMIN — MEROPENEM 1 G: 1 INJECTION, POWDER, FOR SOLUTION INTRAVENOUS at 08:43

## 2019-09-30 RX ADMIN — HYDROMORPHONE HYDROCHLORIDE 0.5 MG: 1 INJECTION, SOLUTION INTRAMUSCULAR; INTRAVENOUS; SUBCUTANEOUS at 02:13

## 2019-09-30 RX ADMIN — MEROPENEM 1 G: 1 INJECTION, POWDER, FOR SOLUTION INTRAVENOUS at 16:10

## 2019-09-30 RX ADMIN — HYDROMORPHONE HYDROCHLORIDE 0.5 MG: 1 INJECTION, SOLUTION INTRAMUSCULAR; INTRAVENOUS; SUBCUTANEOUS at 13:30

## 2019-09-30 RX ADMIN — ACETAMINOPHEN 650 MG: 325 TABLET ORAL at 20:37

## 2019-09-30 RX ADMIN — MEROPENEM 1 G: 1 INJECTION, POWDER, FOR SOLUTION INTRAVENOUS at 00:14

## 2019-09-30 RX ADMIN — METOCLOPRAMIDE 5 MG: 5 INJECTION, SOLUTION INTRAMUSCULAR; INTRAVENOUS at 08:44

## 2019-09-30 RX ADMIN — PANTOPRAZOLE SODIUM 8 MG/HR: 40 INJECTION, POWDER, FOR SOLUTION INTRAVENOUS at 13:30

## 2019-09-30 RX ADMIN — Medication 6 MG: at 18:03

## 2019-09-30 RX ADMIN — HYDROMORPHONE HYDROCHLORIDE 0.5 MG: 1 INJECTION, SOLUTION INTRAMUSCULAR; INTRAVENOUS; SUBCUTANEOUS at 08:44

## 2019-09-30 RX ADMIN — HYDROMORPHONE HYDROCHLORIDE 0.5 MG: 1 INJECTION, SOLUTION INTRAMUSCULAR; INTRAVENOUS; SUBCUTANEOUS at 06:36

## 2019-09-30 RX ADMIN — HYDROCODONE BITARTRATE AND ACETAMINOPHEN 1 TABLET: 7.5; 325 TABLET ORAL at 15:02

## 2019-09-30 RX ADMIN — PROMETHAZINE HYDROCHLORIDE 25 MG: 25 INJECTION INTRAMUSCULAR; INTRAVENOUS at 02:15

## 2019-09-30 ASSESSMENT — PAIN DESCRIPTION - PROGRESSION
CLINICAL_PROGRESSION: NOT CHANGED

## 2019-09-30 ASSESSMENT — PAIN DESCRIPTION - FREQUENCY
FREQUENCY: CONTINUOUS

## 2019-09-30 ASSESSMENT — PAIN DESCRIPTION - ONSET
ONSET: ON-GOING

## 2019-09-30 ASSESSMENT — PAIN - FUNCTIONAL ASSESSMENT
PAIN_FUNCTIONAL_ASSESSMENT: PREVENTS OR INTERFERES SOME ACTIVE ACTIVITIES AND ADLS

## 2019-09-30 ASSESSMENT — PAIN DESCRIPTION - DESCRIPTORS
DESCRIPTORS: ACHING;CONSTANT;DISCOMFORT
DESCRIPTORS: ACHING;CONSTANT;DISCOMFORT
DESCRIPTORS: NAGGING;DISCOMFORT
DESCRIPTORS: DISCOMFORT;SHARP
DESCRIPTORS: ACHING;DISCOMFORT
DESCRIPTORS: ACHING;CRAMPING;DISCOMFORT
DESCRIPTORS: ACHING;DISCOMFORT
DESCRIPTORS: ACHING;DISCOMFORT;SHARP
DESCRIPTORS: ACHING;DISCOMFORT

## 2019-09-30 ASSESSMENT — PAIN DESCRIPTION - ORIENTATION
ORIENTATION: LOWER;MID
ORIENTATION: MID;UPPER;LOWER
ORIENTATION: LOWER
ORIENTATION: UPPER
ORIENTATION: MID;UPPER
ORIENTATION: UPPER
ORIENTATION: MID;UPPER
ORIENTATION: LOWER;MID

## 2019-09-30 ASSESSMENT — PAIN SCALES - GENERAL
PAINLEVEL_OUTOF10: 10
PAINLEVEL_OUTOF10: 10
PAINLEVEL_OUTOF10: 8
PAINLEVEL_OUTOF10: 7
PAINLEVEL_OUTOF10: 7
PAINLEVEL_OUTOF10: 8
PAINLEVEL_OUTOF10: 7
PAINLEVEL_OUTOF10: 10
PAINLEVEL_OUTOF10: 9
PAINLEVEL_OUTOF10: 8
PAINLEVEL_OUTOF10: 10
PAINLEVEL_OUTOF10: 7
PAINLEVEL_OUTOF10: 9

## 2019-09-30 ASSESSMENT — PAIN DESCRIPTION - LOCATION
LOCATION: ABDOMEN
LOCATION: ABDOMEN;BACK
LOCATION: ABDOMEN
LOCATION: ABDOMEN;BACK

## 2019-09-30 ASSESSMENT — PAIN DESCRIPTION - PAIN TYPE
TYPE: ACUTE PAIN

## 2019-09-30 ASSESSMENT — PAIN DESCRIPTION - DIRECTION
RADIATING_TOWARDS: BACK

## 2019-10-01 ENCOUNTER — APPOINTMENT (OUTPATIENT)
Dept: GENERAL RADIOLOGY | Age: 39
DRG: 438 | End: 2019-10-01
Attending: INTERNAL MEDICINE
Payer: COMMERCIAL

## 2019-10-01 ENCOUNTER — APPOINTMENT (OUTPATIENT)
Dept: CT IMAGING | Age: 39
DRG: 438 | End: 2019-10-01
Attending: INTERNAL MEDICINE
Payer: COMMERCIAL

## 2019-10-01 PROBLEM — R18.8 ASCITES: Status: ACTIVE | Noted: 2019-10-01

## 2019-10-01 PROBLEM — R65.10 SIRS (SYSTEMIC INFLAMMATORY RESPONSE SYNDROME) (HCC): Status: ACTIVE | Noted: 2019-10-01

## 2019-10-01 LAB
ALBUMIN SERPL-MCNC: 2.7 G/DL (ref 3.5–5.2)
ALP BLD-CCNC: 48 U/L (ref 40–129)
ALT SERPL-CCNC: 17 U/L (ref 0–40)
AMYLASE: 56 U/L (ref 20–100)
ANION GAP SERPL CALCULATED.3IONS-SCNC: 14 MMOL/L (ref 7–16)
ANISOCYTOSIS: ABNORMAL
AST SERPL-CCNC: 18 U/L (ref 0–39)
BASOPHILS ABSOLUTE: 0 E9/L (ref 0–0.2)
BASOPHILS RELATIVE PERCENT: 0 % (ref 0–2)
BILIRUB SERPL-MCNC: 0.6 MG/DL (ref 0–1.2)
BUN BLDV-MCNC: 7 MG/DL (ref 6–20)
CALCIUM SERPL-MCNC: 7.6 MG/DL (ref 8.6–10.2)
CHLORIDE BLD-SCNC: 96 MMOL/L (ref 98–107)
CO2: 24 MMOL/L (ref 22–29)
CREAT SERPL-MCNC: 0.8 MG/DL (ref 0.7–1.2)
EOSINOPHILS ABSOLUTE: 0 E9/L (ref 0.05–0.5)
EOSINOPHILS RELATIVE PERCENT: 0 % (ref 0–6)
GFR AFRICAN AMERICAN: >60
GFR NON-AFRICAN AMERICAN: >60 ML/MIN/1.73
GLUCOSE BLD-MCNC: 144 MG/DL (ref 74–99)
HCT VFR BLD CALC: 37 % (ref 37–54)
HCT VFR BLD CALC: 40.9 % (ref 37–54)
HEMOGLOBIN: 12 G/DL (ref 12.5–16.5)
HEMOGLOBIN: 13 G/DL (ref 12.5–16.5)
INR BLD: 1.2
LACTIC ACID: 1.1 MMOL/L (ref 0.5–2.2)
LIPASE: 56 U/L (ref 13–60)
LYMPHOCYTES ABSOLUTE: 0.57 E9/L (ref 1.5–4)
LYMPHOCYTES RELATIVE PERCENT: 5 % (ref 20–42)
MCH RBC QN AUTO: 28 PG (ref 26–35)
MCHC RBC AUTO-ENTMCNC: 32.4 % (ref 32–34.5)
MCV RBC AUTO: 86.4 FL (ref 80–99.9)
MONOCYTES ABSOLUTE: 1.71 E9/L (ref 0.1–0.95)
MONOCYTES RELATIVE PERCENT: 15 % (ref 2–12)
NEUTROPHILS ABSOLUTE: 9.12 E9/L (ref 1.8–7.3)
NEUTROPHILS RELATIVE PERCENT: 80 % (ref 43–80)
PDW BLD-RTO: 15.7 FL (ref 11.5–15)
PLATELET # BLD: 176 E9/L (ref 130–450)
PMV BLD AUTO: 11.1 FL (ref 7–12)
POTASSIUM REFLEX MAGNESIUM: 3.6 MMOL/L (ref 3.5–5)
PROTHROMBIN TIME: 14.1 SEC (ref 9.3–12.4)
RBC # BLD: 4.28 E12/L (ref 3.8–5.8)
SODIUM BLD-SCNC: 134 MMOL/L (ref 132–146)
TOTAL PROTEIN: 5.8 G/DL (ref 6.4–8.3)
WBC # BLD: 11.4 E9/L (ref 4.5–11.5)

## 2019-10-01 PROCEDURE — 2580000003 HC RX 258: Performed by: CLINICAL NURSE SPECIALIST

## 2019-10-01 PROCEDURE — 85610 PROTHROMBIN TIME: CPT

## 2019-10-01 PROCEDURE — 36415 COLL VENOUS BLD VENIPUNCTURE: CPT

## 2019-10-01 PROCEDURE — 82150 ASSAY OF AMYLASE: CPT

## 2019-10-01 PROCEDURE — 6360000002 HC RX W HCPCS: Performed by: NURSE PRACTITIONER

## 2019-10-01 PROCEDURE — 87081 CULTURE SCREEN ONLY: CPT

## 2019-10-01 PROCEDURE — 80053 COMPREHEN METABOLIC PANEL: CPT

## 2019-10-01 PROCEDURE — 71275 CT ANGIOGRAPHY CHEST: CPT

## 2019-10-01 PROCEDURE — 85018 HEMOGLOBIN: CPT

## 2019-10-01 PROCEDURE — 83605 ASSAY OF LACTIC ACID: CPT

## 2019-10-01 PROCEDURE — 6360000002 HC RX W HCPCS: Performed by: CLINICAL NURSE SPECIALIST

## 2019-10-01 PROCEDURE — 6360000002 HC RX W HCPCS: Performed by: INTERNAL MEDICINE

## 2019-10-01 PROCEDURE — 2000000000 HC ICU R&B

## 2019-10-01 PROCEDURE — C9113 INJ PANTOPRAZOLE SODIUM, VIA: HCPCS | Performed by: CLINICAL NURSE SPECIALIST

## 2019-10-01 PROCEDURE — 2580000003 HC RX 258: Performed by: INTERNAL MEDICINE

## 2019-10-01 PROCEDURE — 2700000000 HC OXYGEN THERAPY PER DAY

## 2019-10-01 PROCEDURE — 83690 ASSAY OF LIPASE: CPT

## 2019-10-01 PROCEDURE — 6370000000 HC RX 637 (ALT 250 FOR IP): Performed by: NURSE PRACTITIONER

## 2019-10-01 PROCEDURE — 6360000004 HC RX CONTRAST MEDICATION: Performed by: RADIOLOGY

## 2019-10-01 PROCEDURE — 94770 HC ETCO2 MONITOR DAILY: CPT

## 2019-10-01 PROCEDURE — 74018 RADEX ABDOMEN 1 VIEW: CPT

## 2019-10-01 PROCEDURE — 85014 HEMATOCRIT: CPT

## 2019-10-01 PROCEDURE — 85025 COMPLETE CBC W/AUTO DIFF WBC: CPT

## 2019-10-01 PROCEDURE — C9113 INJ PANTOPRAZOLE SODIUM, VIA: HCPCS | Performed by: INTERNAL MEDICINE

## 2019-10-01 RX ORDER — FENTANYL CITRATE 50 UG/ML
50 INJECTION, SOLUTION INTRAMUSCULAR; INTRAVENOUS ONCE
Status: COMPLETED | OUTPATIENT
Start: 2019-10-01 | End: 2019-10-01

## 2019-10-01 RX ORDER — SODIUM CHLORIDE, SODIUM LACTATE, POTASSIUM CHLORIDE, AND CALCIUM CHLORIDE .6; .31; .03; .02 G/100ML; G/100ML; G/100ML; G/100ML
1000 INJECTION, SOLUTION INTRAVENOUS ONCE
Status: COMPLETED | OUTPATIENT
Start: 2019-10-01 | End: 2019-10-01

## 2019-10-01 RX ORDER — PANTOPRAZOLE SODIUM 40 MG/10ML
40 INJECTION, POWDER, LYOPHILIZED, FOR SOLUTION INTRAVENOUS 2 TIMES DAILY
Status: DISCONTINUED | OUTPATIENT
Start: 2019-10-01 | End: 2019-10-08

## 2019-10-01 RX ORDER — 0.9 % SODIUM CHLORIDE 0.9 %
10 VIAL (ML) INJECTION 2 TIMES DAILY
Status: DISCONTINUED | OUTPATIENT
Start: 2019-10-01 | End: 2019-10-08

## 2019-10-01 RX ORDER — METOCLOPRAMIDE HYDROCHLORIDE 5 MG/ML
5 INJECTION INTRAMUSCULAR; INTRAVENOUS EVERY 6 HOURS
Status: DISCONTINUED | OUTPATIENT
Start: 2019-10-01 | End: 2019-10-07

## 2019-10-01 RX ORDER — LORAZEPAM 2 MG/ML
1 INJECTION INTRAMUSCULAR ONCE
Status: COMPLETED | OUTPATIENT
Start: 2019-10-01 | End: 2019-10-01

## 2019-10-01 RX ORDER — SODIUM CHLORIDE 0.9 % (FLUSH) 0.9 %
10 SYRINGE (ML) INJECTION PRN
Status: DISCONTINUED | OUTPATIENT
Start: 2019-10-01 | End: 2019-10-09 | Stop reason: HOSPADM

## 2019-10-01 RX ADMIN — PANTOPRAZOLE SODIUM 40 MG: 40 INJECTION, POWDER, FOR SOLUTION INTRAVENOUS at 08:31

## 2019-10-01 RX ADMIN — ACETAMINOPHEN 650 MG: 325 TABLET ORAL at 03:36

## 2019-10-01 RX ADMIN — SODIUM CHLORIDE, POTASSIUM CHLORIDE, SODIUM LACTATE AND CALCIUM CHLORIDE: 600; 310; 30; 20 INJECTION, SOLUTION INTRAVENOUS at 01:06

## 2019-10-01 RX ADMIN — SODIUM CHLORIDE, POTASSIUM CHLORIDE, SODIUM LACTATE AND CALCIUM CHLORIDE 1000 ML: 600; 310; 30; 20 INJECTION, SOLUTION INTRAVENOUS at 09:36

## 2019-10-01 RX ADMIN — LORAZEPAM 1 MG: 2 INJECTION INTRAMUSCULAR at 12:15

## 2019-10-01 RX ADMIN — PANTOPRAZOLE SODIUM 8 MG/HR: 40 INJECTION, POWDER, FOR SOLUTION INTRAVENOUS at 01:07

## 2019-10-01 RX ADMIN — PANTOPRAZOLE SODIUM 40 MG: 40 INJECTION, POWDER, FOR SOLUTION INTRAVENOUS at 20:51

## 2019-10-01 RX ADMIN — IOPAMIDOL 100 ML: 755 INJECTION, SOLUTION INTRAVENOUS at 11:19

## 2019-10-01 RX ADMIN — METOCLOPRAMIDE 5 MG: 5 INJECTION, SOLUTION INTRAMUSCULAR; INTRAVENOUS at 20:51

## 2019-10-01 RX ADMIN — Medication 6 MG: at 14:16

## 2019-10-01 RX ADMIN — PROCHLORPERAZINE EDISYLATE 10 MG: 5 INJECTION INTRAMUSCULAR; INTRAVENOUS at 16:23

## 2019-10-01 RX ADMIN — Medication 10 ML: at 08:32

## 2019-10-01 RX ADMIN — MEROPENEM 1 G: 1 INJECTION, POWDER, FOR SOLUTION INTRAVENOUS at 16:54

## 2019-10-01 RX ADMIN — MEROPENEM 1 G: 1 INJECTION, POWDER, FOR SOLUTION INTRAVENOUS at 01:05

## 2019-10-01 RX ADMIN — METOCLOPRAMIDE 5 MG: 5 INJECTION, SOLUTION INTRAMUSCULAR; INTRAVENOUS at 08:32

## 2019-10-01 RX ADMIN — Medication 10 ML: at 20:51

## 2019-10-01 RX ADMIN — MEROPENEM 1 G: 1 INJECTION, POWDER, FOR SOLUTION INTRAVENOUS at 08:31

## 2019-10-01 RX ADMIN — Medication 10 ML: at 08:31

## 2019-10-01 RX ADMIN — SODIUM CHLORIDE, POTASSIUM CHLORIDE, SODIUM LACTATE AND CALCIUM CHLORIDE: 600; 310; 30; 20 INJECTION, SOLUTION INTRAVENOUS at 19:17

## 2019-10-01 RX ADMIN — Medication 10 ML: at 09:26

## 2019-10-01 RX ADMIN — METOCLOPRAMIDE 5 MG: 5 INJECTION, SOLUTION INTRAMUSCULAR; INTRAVENOUS at 14:23

## 2019-10-01 RX ADMIN — FENTANYL CITRATE 50 MCG: 50 INJECTION INTRAMUSCULAR; INTRAVENOUS at 12:24

## 2019-10-01 RX ADMIN — ENOXAPARIN SODIUM 40 MG: 40 INJECTION SUBCUTANEOUS at 14:22

## 2019-10-01 ASSESSMENT — PAIN SCALES - GENERAL
PAINLEVEL_OUTOF10: 8
PAINLEVEL_OUTOF10: 4
PAINLEVEL_OUTOF10: 9
PAINLEVEL_OUTOF10: 7
PAINLEVEL_OUTOF10: 7
PAINLEVEL_OUTOF10: 6
PAINLEVEL_OUTOF10: 6
PAINLEVEL_OUTOF10: 0
PAINLEVEL_OUTOF10: 5

## 2019-10-01 ASSESSMENT — PAIN DESCRIPTION - PROGRESSION
CLINICAL_PROGRESSION: GRADUALLY WORSENING
CLINICAL_PROGRESSION: GRADUALLY WORSENING
CLINICAL_PROGRESSION: NOT CHANGED

## 2019-10-01 ASSESSMENT — PAIN DESCRIPTION - ONSET
ONSET: ON-GOING

## 2019-10-01 ASSESSMENT — PAIN - FUNCTIONAL ASSESSMENT
PAIN_FUNCTIONAL_ASSESSMENT: PREVENTS OR INTERFERES SOME ACTIVE ACTIVITIES AND ADLS
PAIN_FUNCTIONAL_ASSESSMENT: PREVENTS OR INTERFERES SOME ACTIVE ACTIVITIES AND ADLS

## 2019-10-01 ASSESSMENT — PAIN DESCRIPTION - PAIN TYPE
TYPE: ACUTE PAIN

## 2019-10-01 ASSESSMENT — PAIN DESCRIPTION - LOCATION
LOCATION: ABDOMEN;BACK
LOCATION: ABDOMEN
LOCATION: ABDOMEN;BACK

## 2019-10-01 ASSESSMENT — PAIN DESCRIPTION - ORIENTATION
ORIENTATION: MID
ORIENTATION: UPPER;RIGHT;LEFT
ORIENTATION: MID

## 2019-10-01 ASSESSMENT — PAIN DESCRIPTION - DESCRIPTORS
DESCRIPTORS: ACHING;CONSTANT;DISCOMFORT
DESCRIPTORS: ACHING;DISCOMFORT
DESCRIPTORS: ACHING;CONSTANT;DISCOMFORT

## 2019-10-01 ASSESSMENT — PAIN DESCRIPTION - FREQUENCY
FREQUENCY: CONTINUOUS

## 2019-10-01 ASSESSMENT — PAIN DESCRIPTION - DIRECTION
RADIATING_TOWARDS: BACK
RADIATING_TOWARDS: BACK

## 2019-10-02 ENCOUNTER — APPOINTMENT (OUTPATIENT)
Dept: ULTRASOUND IMAGING | Age: 39
DRG: 438 | End: 2019-10-02
Attending: INTERNAL MEDICINE
Payer: COMMERCIAL

## 2019-10-02 ENCOUNTER — APPOINTMENT (OUTPATIENT)
Dept: GENERAL RADIOLOGY | Age: 39
DRG: 438 | End: 2019-10-02
Attending: INTERNAL MEDICINE
Payer: COMMERCIAL

## 2019-10-02 LAB
ALBUMIN SERPL-MCNC: 2.8 G/DL (ref 3.5–5.2)
ALP BLD-CCNC: 55 U/L (ref 40–129)
ALT SERPL-CCNC: 22 U/L (ref 0–40)
AMYLASE: 45 U/L (ref 20–100)
ANION GAP SERPL CALCULATED.3IONS-SCNC: 14 MMOL/L (ref 7–16)
ANISOCYTOSIS: ABNORMAL
AST SERPL-CCNC: 23 U/L (ref 0–39)
BASOPHILS ABSOLUTE: 0.04 E9/L (ref 0–0.2)
BASOPHILS RELATIVE PERCENT: 0.3 % (ref 0–2)
BILIRUB SERPL-MCNC: 0.4 MG/DL (ref 0–1.2)
BUN BLDV-MCNC: 7 MG/DL (ref 6–20)
C-REACTIVE PROTEIN: 42.7 MG/DL (ref 0–0.4)
CALCIUM SERPL-MCNC: 7.8 MG/DL (ref 8.6–10.2)
CHLORIDE BLD-SCNC: 94 MMOL/L (ref 98–107)
CO2: 25 MMOL/L (ref 22–29)
CREAT SERPL-MCNC: 0.8 MG/DL (ref 0.7–1.2)
EOSINOPHILS ABSOLUTE: 0.01 E9/L (ref 0.05–0.5)
EOSINOPHILS RELATIVE PERCENT: 0.1 % (ref 0–6)
GFR AFRICAN AMERICAN: >60
GFR NON-AFRICAN AMERICAN: >60 ML/MIN/1.73
GLUCOSE BLD-MCNC: 115 MG/DL (ref 74–99)
HCT VFR BLD CALC: 39.6 % (ref 37–54)
HEMOGLOBIN: 12.8 G/DL (ref 12.5–16.5)
IMMATURE GRANULOCYTES #: 0.12 E9/L
IMMATURE GRANULOCYTES %: 0.8 % (ref 0–5)
LIPASE: 50 U/L (ref 13–60)
LYMPHOCYTES ABSOLUTE: 0.71 E9/L (ref 1.5–4)
LYMPHOCYTES RELATIVE PERCENT: 4.9 % (ref 20–42)
MAGNESIUM: 1.9 MG/DL (ref 1.6–2.6)
MCH RBC QN AUTO: 28.2 PG (ref 26–35)
MCHC RBC AUTO-ENTMCNC: 32.3 % (ref 32–34.5)
MCV RBC AUTO: 87.2 FL (ref 80–99.9)
MONOCYTES ABSOLUTE: 1.63 E9/L (ref 0.1–0.95)
MONOCYTES RELATIVE PERCENT: 11.2 % (ref 2–12)
MRSA CULTURE ONLY: NORMAL
NEUTROPHILS ABSOLUTE: 11.99 E9/L (ref 1.8–7.3)
NEUTROPHILS RELATIVE PERCENT: 82.7 % (ref 43–80)
PDW BLD-RTO: 15.6 FL (ref 11.5–15)
PLATELET # BLD: 239 E9/L (ref 130–450)
PMV BLD AUTO: 10.6 FL (ref 7–12)
POTASSIUM REFLEX MAGNESIUM: 3.3 MMOL/L (ref 3.5–5)
RBC # BLD: 4.54 E12/L (ref 3.8–5.8)
SODIUM BLD-SCNC: 133 MMOL/L (ref 132–146)
TOTAL PROTEIN: 6.1 G/DL (ref 6.4–8.3)
WBC # BLD: 14.5 E9/L (ref 4.5–11.5)

## 2019-10-02 PROCEDURE — 2580000003 HC RX 258: Performed by: INTERNAL MEDICINE

## 2019-10-02 PROCEDURE — 83690 ASSAY OF LIPASE: CPT

## 2019-10-02 PROCEDURE — C9113 INJ PANTOPRAZOLE SODIUM, VIA: HCPCS | Performed by: CLINICAL NURSE SPECIALIST

## 2019-10-02 PROCEDURE — 86140 C-REACTIVE PROTEIN: CPT

## 2019-10-02 PROCEDURE — 76705 ECHO EXAM OF ABDOMEN: CPT

## 2019-10-02 PROCEDURE — 6360000002 HC RX W HCPCS: Performed by: INTERNAL MEDICINE

## 2019-10-02 PROCEDURE — 2700000000 HC OXYGEN THERAPY PER DAY

## 2019-10-02 PROCEDURE — 74018 RADEX ABDOMEN 1 VIEW: CPT

## 2019-10-02 PROCEDURE — 94770 HC ETCO2 MONITOR DAILY: CPT

## 2019-10-02 PROCEDURE — 85025 COMPLETE CBC W/AUTO DIFF WBC: CPT

## 2019-10-02 PROCEDURE — 6370000000 HC RX 637 (ALT 250 FOR IP): Performed by: INTERNAL MEDICINE

## 2019-10-02 PROCEDURE — 82150 ASSAY OF AMYLASE: CPT

## 2019-10-02 PROCEDURE — 36415 COLL VENOUS BLD VENIPUNCTURE: CPT

## 2019-10-02 PROCEDURE — 2580000003 HC RX 258: Performed by: CLINICAL NURSE SPECIALIST

## 2019-10-02 PROCEDURE — 83735 ASSAY OF MAGNESIUM: CPT

## 2019-10-02 PROCEDURE — 71045 X-RAY EXAM CHEST 1 VIEW: CPT

## 2019-10-02 PROCEDURE — 2000000000 HC ICU R&B

## 2019-10-02 PROCEDURE — 6360000002 HC RX W HCPCS: Performed by: CLINICAL NURSE SPECIALIST

## 2019-10-02 PROCEDURE — 80053 COMPREHEN METABOLIC PANEL: CPT

## 2019-10-02 RX ORDER — SIMETHICONE 20 MG/.3ML
40 EMULSION ORAL 4 TIMES DAILY
Status: DISCONTINUED | OUTPATIENT
Start: 2019-10-02 | End: 2019-10-09 | Stop reason: HOSPADM

## 2019-10-02 RX ORDER — SIMETHICONE 20 MG/.3ML
40 EMULSION ORAL 4 TIMES DAILY
Status: DISCONTINUED | OUTPATIENT
Start: 2019-10-02 | End: 2019-10-02

## 2019-10-02 RX ORDER — POTASSIUM CHLORIDE 7.45 MG/ML
10 INJECTION INTRAVENOUS
Status: COMPLETED | OUTPATIENT
Start: 2019-10-02 | End: 2019-10-02

## 2019-10-02 RX ADMIN — PANTOPRAZOLE SODIUM 40 MG: 40 INJECTION, POWDER, FOR SOLUTION INTRAVENOUS at 21:22

## 2019-10-02 RX ADMIN — METOCLOPRAMIDE 5 MG: 5 INJECTION, SOLUTION INTRAMUSCULAR; INTRAVENOUS at 21:22

## 2019-10-02 RX ADMIN — Medication 10 ML: at 09:44

## 2019-10-02 RX ADMIN — Medication 10 ML: at 09:47

## 2019-10-02 RX ADMIN — PANTOPRAZOLE SODIUM 40 MG: 40 INJECTION, POWDER, FOR SOLUTION INTRAVENOUS at 09:44

## 2019-10-02 RX ADMIN — SIMETHICONE 40 MG: 20 SUSPENSION/ DROPS ORAL at 17:03

## 2019-10-02 RX ADMIN — SODIUM CHLORIDE, POTASSIUM CHLORIDE, SODIUM LACTATE AND CALCIUM CHLORIDE: 600; 310; 30; 20 INJECTION, SOLUTION INTRAVENOUS at 07:56

## 2019-10-02 RX ADMIN — METOCLOPRAMIDE 5 MG: 5 INJECTION, SOLUTION INTRAMUSCULAR; INTRAVENOUS at 08:49

## 2019-10-02 RX ADMIN — POTASSIUM CHLORIDE 10 MEQ: 10 INJECTION, SOLUTION INTRAVENOUS at 08:50

## 2019-10-02 RX ADMIN — MEROPENEM 1 G: 1 INJECTION, POWDER, FOR SOLUTION INTRAVENOUS at 00:18

## 2019-10-02 RX ADMIN — METOCLOPRAMIDE 5 MG: 5 INJECTION, SOLUTION INTRAMUSCULAR; INTRAVENOUS at 14:00

## 2019-10-02 RX ADMIN — Medication 6 MG: at 08:43

## 2019-10-02 RX ADMIN — MEROPENEM 1 G: 1 INJECTION, POWDER, FOR SOLUTION INTRAVENOUS at 08:30

## 2019-10-02 RX ADMIN — SODIUM CHLORIDE, POTASSIUM CHLORIDE, SODIUM LACTATE AND CALCIUM CHLORIDE: 600; 310; 30; 20 INJECTION, SOLUTION INTRAVENOUS at 15:09

## 2019-10-02 RX ADMIN — MEROPENEM 1 G: 1 INJECTION, POWDER, FOR SOLUTION INTRAVENOUS at 15:11

## 2019-10-02 RX ADMIN — POTASSIUM CHLORIDE 10 MEQ: 10 INJECTION, SOLUTION INTRAVENOUS at 13:53

## 2019-10-02 RX ADMIN — POTASSIUM CHLORIDE 10 MEQ: 10 INJECTION, SOLUTION INTRAVENOUS at 10:04

## 2019-10-02 RX ADMIN — POTASSIUM CHLORIDE 10 MEQ: 10 INJECTION, SOLUTION INTRAVENOUS at 15:08

## 2019-10-02 RX ADMIN — SIMETHICONE 40 MG: 20 SUSPENSION/ DROPS ORAL at 09:47

## 2019-10-02 RX ADMIN — POTASSIUM CHLORIDE 10 MEQ: 10 INJECTION, SOLUTION INTRAVENOUS at 11:10

## 2019-10-02 RX ADMIN — POTASSIUM CHLORIDE 10 MEQ: 10 INJECTION, SOLUTION INTRAVENOUS at 12:45

## 2019-10-02 RX ADMIN — SIMETHICONE 40 MG: 20 SUSPENSION/ DROPS ORAL at 21:22

## 2019-10-02 RX ADMIN — METOCLOPRAMIDE 5 MG: 5 INJECTION, SOLUTION INTRAMUSCULAR; INTRAVENOUS at 03:28

## 2019-10-02 ASSESSMENT — PAIN SCALES - GENERAL
PAINLEVEL_OUTOF10: 0

## 2019-10-03 LAB
ALBUMIN SERPL-MCNC: 2.6 G/DL (ref 3.5–5.2)
ALP BLD-CCNC: 62 U/L (ref 40–129)
ALT SERPL-CCNC: 22 U/L (ref 0–40)
AMYLASE: 31 U/L (ref 20–100)
ANION GAP SERPL CALCULATED.3IONS-SCNC: 13 MMOL/L (ref 7–16)
AST SERPL-CCNC: 29 U/L (ref 0–39)
BASOPHILS ABSOLUTE: 0.03 E9/L (ref 0–0.2)
BASOPHILS RELATIVE PERCENT: 0.3 % (ref 0–2)
BILIRUB SERPL-MCNC: 0.4 MG/DL (ref 0–1.2)
BUN BLDV-MCNC: 7 MG/DL (ref 6–20)
C-REACTIVE PROTEIN: 34.8 MG/DL (ref 0–0.4)
CALCIUM SERPL-MCNC: 7.9 MG/DL (ref 8.6–10.2)
CHLORIDE BLD-SCNC: 97 MMOL/L (ref 98–107)
CO2: 25 MMOL/L (ref 22–29)
CREAT SERPL-MCNC: 0.7 MG/DL (ref 0.7–1.2)
EOSINOPHILS ABSOLUTE: 0.04 E9/L (ref 0.05–0.5)
EOSINOPHILS RELATIVE PERCENT: 0.3 % (ref 0–6)
GFR AFRICAN AMERICAN: >60
GFR NON-AFRICAN AMERICAN: >60 ML/MIN/1.73
GLUCOSE BLD-MCNC: 102 MG/DL (ref 74–99)
HCT VFR BLD CALC: 36.2 % (ref 37–54)
HEMOGLOBIN: 11.4 G/DL (ref 12.5–16.5)
IMMATURE GRANULOCYTES #: 0.15 E9/L
IMMATURE GRANULOCYTES %: 1.3 % (ref 0–5)
LIPASE: 41 U/L (ref 13–60)
LYMPHOCYTES ABSOLUTE: 0.46 E9/L (ref 1.5–4)
LYMPHOCYTES RELATIVE PERCENT: 3.9 % (ref 20–42)
MAGNESIUM: 2.1 MG/DL (ref 1.6–2.6)
MCH RBC QN AUTO: 27.5 PG (ref 26–35)
MCHC RBC AUTO-ENTMCNC: 31.5 % (ref 32–34.5)
MCV RBC AUTO: 87.4 FL (ref 80–99.9)
MONOCYTES ABSOLUTE: 1.9 E9/L (ref 0.1–0.95)
MONOCYTES RELATIVE PERCENT: 16 % (ref 2–12)
NEUTROPHILS ABSOLUTE: 9.28 E9/L (ref 1.8–7.3)
NEUTROPHILS RELATIVE PERCENT: 78.2 % (ref 43–80)
PDW BLD-RTO: 15.9 FL (ref 11.5–15)
PLATELET # BLD: 221 E9/L (ref 130–450)
PMV BLD AUTO: 10.3 FL (ref 7–12)
POTASSIUM REFLEX MAGNESIUM: 3.8 MMOL/L (ref 3.5–5)
RBC # BLD: 4.14 E12/L (ref 3.8–5.8)
SODIUM BLD-SCNC: 135 MMOL/L (ref 132–146)
TOTAL PROTEIN: 5.7 G/DL (ref 6.4–8.3)
WBC # BLD: 11.9 E9/L (ref 4.5–11.5)

## 2019-10-03 PROCEDURE — 2000000000 HC ICU R&B

## 2019-10-03 PROCEDURE — 2700000000 HC OXYGEN THERAPY PER DAY

## 2019-10-03 PROCEDURE — 2500000003 HC RX 250 WO HCPCS: Performed by: INTERNAL MEDICINE

## 2019-10-03 PROCEDURE — 6370000000 HC RX 637 (ALT 250 FOR IP): Performed by: INTERNAL MEDICINE

## 2019-10-03 PROCEDURE — 6360000002 HC RX W HCPCS: Performed by: INTERNAL MEDICINE

## 2019-10-03 PROCEDURE — 82150 ASSAY OF AMYLASE: CPT

## 2019-10-03 PROCEDURE — 86140 C-REACTIVE PROTEIN: CPT

## 2019-10-03 PROCEDURE — C9113 INJ PANTOPRAZOLE SODIUM, VIA: HCPCS | Performed by: CLINICAL NURSE SPECIALIST

## 2019-10-03 PROCEDURE — 85025 COMPLETE CBC W/AUTO DIFF WBC: CPT

## 2019-10-03 PROCEDURE — 6360000002 HC RX W HCPCS: Performed by: CLINICAL NURSE SPECIALIST

## 2019-10-03 PROCEDURE — 94770 HC ETCO2 MONITOR DAILY: CPT

## 2019-10-03 PROCEDURE — 2580000003 HC RX 258: Performed by: INTERNAL MEDICINE

## 2019-10-03 PROCEDURE — 94664 DEMO&/EVAL PT USE INHALER: CPT

## 2019-10-03 PROCEDURE — 94640 AIRWAY INHALATION TREATMENT: CPT

## 2019-10-03 PROCEDURE — 80053 COMPREHEN METABOLIC PANEL: CPT

## 2019-10-03 PROCEDURE — 83690 ASSAY OF LIPASE: CPT

## 2019-10-03 PROCEDURE — 2580000003 HC RX 258: Performed by: CLINICAL NURSE SPECIALIST

## 2019-10-03 PROCEDURE — 83735 ASSAY OF MAGNESIUM: CPT

## 2019-10-03 PROCEDURE — 36415 COLL VENOUS BLD VENIPUNCTURE: CPT

## 2019-10-03 RX ORDER — IPRATROPIUM BROMIDE AND ALBUTEROL SULFATE 2.5; .5 MG/3ML; MG/3ML
1 SOLUTION RESPIRATORY (INHALATION)
Status: DISCONTINUED | OUTPATIENT
Start: 2019-10-03 | End: 2019-10-09 | Stop reason: HOSPADM

## 2019-10-03 RX ORDER — LABETALOL HYDROCHLORIDE 5 MG/ML
10 INJECTION, SOLUTION INTRAVENOUS
Status: DISCONTINUED | OUTPATIENT
Start: 2019-10-03 | End: 2019-10-09 | Stop reason: HOSPADM

## 2019-10-03 RX ORDER — HYDRALAZINE HYDROCHLORIDE 20 MG/ML
10 INJECTION INTRAMUSCULAR; INTRAVENOUS
Status: DISCONTINUED | OUTPATIENT
Start: 2019-10-03 | End: 2019-10-09 | Stop reason: HOSPADM

## 2019-10-03 RX ORDER — POTASSIUM CHLORIDE 7.45 MG/ML
10 INJECTION INTRAVENOUS
Status: COMPLETED | OUTPATIENT
Start: 2019-10-03 | End: 2019-10-03

## 2019-10-03 RX ADMIN — POTASSIUM CHLORIDE 10 MEQ: 7.46 INJECTION, SOLUTION INTRAVENOUS at 08:41

## 2019-10-03 RX ADMIN — SODIUM CHLORIDE, POTASSIUM CHLORIDE, SODIUM LACTATE AND CALCIUM CHLORIDE: 600; 310; 30; 20 INJECTION, SOLUTION INTRAVENOUS at 05:10

## 2019-10-03 RX ADMIN — IPRATROPIUM BROMIDE AND ALBUTEROL SULFATE 1 AMPULE: .5; 3 SOLUTION RESPIRATORY (INHALATION) at 19:47

## 2019-10-03 RX ADMIN — MEROPENEM 1 G: 1 INJECTION, POWDER, FOR SOLUTION INTRAVENOUS at 16:04

## 2019-10-03 RX ADMIN — POTASSIUM CHLORIDE 10 MEQ: 7.46 INJECTION, SOLUTION INTRAVENOUS at 09:45

## 2019-10-03 RX ADMIN — IPRATROPIUM BROMIDE AND ALBUTEROL SULFATE 1 AMPULE: .5; 3 SOLUTION RESPIRATORY (INHALATION) at 16:11

## 2019-10-03 RX ADMIN — METOCLOPRAMIDE 5 MG: 5 INJECTION, SOLUTION INTRAMUSCULAR; INTRAVENOUS at 03:00

## 2019-10-03 RX ADMIN — LABETALOL HYDROCHLORIDE 10 MG: 5 INJECTION, SOLUTION INTRAVENOUS at 09:45

## 2019-10-03 RX ADMIN — Medication 6 MG: at 00:21

## 2019-10-03 RX ADMIN — HYDRALAZINE HYDROCHLORIDE 10 MG: 20 INJECTION INTRAMUSCULAR; INTRAVENOUS at 14:45

## 2019-10-03 RX ADMIN — METOCLOPRAMIDE 5 MG: 5 INJECTION, SOLUTION INTRAMUSCULAR; INTRAVENOUS at 14:44

## 2019-10-03 RX ADMIN — SIMETHICONE 40 MG: 20 SUSPENSION/ DROPS ORAL at 17:27

## 2019-10-03 RX ADMIN — MEROPENEM 1 G: 1 INJECTION, POWDER, FOR SOLUTION INTRAVENOUS at 08:39

## 2019-10-03 RX ADMIN — Medication 10 ML: at 21:09

## 2019-10-03 RX ADMIN — PANTOPRAZOLE SODIUM 40 MG: 40 INJECTION, POWDER, FOR SOLUTION INTRAVENOUS at 21:09

## 2019-10-03 RX ADMIN — MEROPENEM 1 G: 1 INJECTION, POWDER, FOR SOLUTION INTRAVENOUS at 23:42

## 2019-10-03 RX ADMIN — SIMETHICONE 40 MG: 20 SUSPENSION/ DROPS ORAL at 08:42

## 2019-10-03 RX ADMIN — SIMETHICONE 40 MG: 20 SUSPENSION/ DROPS ORAL at 21:09

## 2019-10-03 RX ADMIN — PANTOPRAZOLE SODIUM 40 MG: 40 INJECTION, POWDER, FOR SOLUTION INTRAVENOUS at 08:41

## 2019-10-03 RX ADMIN — Medication 10 ML: at 08:41

## 2019-10-03 RX ADMIN — SODIUM CHLORIDE, POTASSIUM CHLORIDE, SODIUM LACTATE AND CALCIUM CHLORIDE: 600; 310; 30; 20 INJECTION, SOLUTION INTRAVENOUS at 12:30

## 2019-10-03 RX ADMIN — METOCLOPRAMIDE 5 MG: 5 INJECTION, SOLUTION INTRAMUSCULAR; INTRAVENOUS at 21:09

## 2019-10-03 RX ADMIN — MEROPENEM 1 G: 1 INJECTION, POWDER, FOR SOLUTION INTRAVENOUS at 00:21

## 2019-10-03 RX ADMIN — METOCLOPRAMIDE 5 MG: 5 INJECTION, SOLUTION INTRAMUSCULAR; INTRAVENOUS at 08:41

## 2019-10-03 RX ADMIN — SIMETHICONE 40 MG: 20 SUSPENSION/ DROPS ORAL at 13:41

## 2019-10-03 ASSESSMENT — PAIN SCALES - GENERAL
PAINLEVEL_OUTOF10: 0

## 2019-10-04 LAB
ALBUMIN SERPL-MCNC: 2.5 G/DL (ref 3.5–5.2)
ALP BLD-CCNC: 66 U/L (ref 40–129)
ALT SERPL-CCNC: 37 U/L (ref 0–40)
AMYLASE: 24 U/L (ref 20–100)
ANION GAP SERPL CALCULATED.3IONS-SCNC: 13 MMOL/L (ref 7–16)
ANISOCYTOSIS: ABNORMAL
AST SERPL-CCNC: 60 U/L (ref 0–39)
BASOPHILS ABSOLUTE: 0.04 E9/L (ref 0–0.2)
BASOPHILS RELATIVE PERCENT: 0.3 % (ref 0–2)
BILIRUB SERPL-MCNC: 0.3 MG/DL (ref 0–1.2)
BLOOD CULTURE, ROUTINE: NORMAL
BUN BLDV-MCNC: 6 MG/DL (ref 6–20)
C-REACTIVE PROTEIN: 32.1 MG/DL (ref 0–0.4)
CALCIUM SERPL-MCNC: 7.8 MG/DL (ref 8.6–10.2)
CHLORIDE BLD-SCNC: 94 MMOL/L (ref 98–107)
CO2: 25 MMOL/L (ref 22–29)
CREAT SERPL-MCNC: 0.7 MG/DL (ref 0.7–1.2)
CULTURE, BLOOD 2: NORMAL
EOSINOPHILS ABSOLUTE: 0.05 E9/L (ref 0.05–0.5)
EOSINOPHILS RELATIVE PERCENT: 0.4 % (ref 0–6)
GFR AFRICAN AMERICAN: >60
GFR NON-AFRICAN AMERICAN: >60 ML/MIN/1.73
GLUCOSE BLD-MCNC: 105 MG/DL (ref 74–99)
HCT VFR BLD CALC: 32 % (ref 37–54)
HEMOGLOBIN: 10.5 G/DL (ref 12.5–16.5)
IMMATURE GRANULOCYTES #: 0.26 E9/L
IMMATURE GRANULOCYTES %: 2.1 % (ref 0–5)
LIPASE: 46 U/L (ref 13–60)
LYMPHOCYTES ABSOLUTE: 0.64 E9/L (ref 1.5–4)
LYMPHOCYTES RELATIVE PERCENT: 5.1 % (ref 20–42)
MAGNESIUM: 2.1 MG/DL (ref 1.6–2.6)
MCH RBC QN AUTO: 28.6 PG (ref 26–35)
MCHC RBC AUTO-ENTMCNC: 32.8 % (ref 32–34.5)
MCV RBC AUTO: 87.2 FL (ref 80–99.9)
MONOCYTES ABSOLUTE: 1.61 E9/L (ref 0.1–0.95)
MONOCYTES RELATIVE PERCENT: 12.8 % (ref 2–12)
NEUTROPHILS ABSOLUTE: 10.01 E9/L (ref 1.8–7.3)
NEUTROPHILS RELATIVE PERCENT: 79.3 % (ref 43–80)
PDW BLD-RTO: 16.4 FL (ref 11.5–15)
PLATELET # BLD: 251 E9/L (ref 130–450)
PMV BLD AUTO: 10.6 FL (ref 7–12)
POLYCHROMASIA: ABNORMAL
POTASSIUM REFLEX MAGNESIUM: 3.2 MMOL/L (ref 3.5–5)
RBC # BLD: 3.67 E12/L (ref 3.8–5.8)
SODIUM BLD-SCNC: 132 MMOL/L (ref 132–146)
TOTAL PROTEIN: 5.4 G/DL (ref 6.4–8.3)
WBC # BLD: 12.6 E9/L (ref 4.5–11.5)

## 2019-10-04 PROCEDURE — 6360000002 HC RX W HCPCS: Performed by: INTERNAL MEDICINE

## 2019-10-04 PROCEDURE — 2500000003 HC RX 250 WO HCPCS: Performed by: INTERNAL MEDICINE

## 2019-10-04 PROCEDURE — 6370000000 HC RX 637 (ALT 250 FOR IP): Performed by: INTERNAL MEDICINE

## 2019-10-04 PROCEDURE — 83690 ASSAY OF LIPASE: CPT

## 2019-10-04 PROCEDURE — 2000000000 HC ICU R&B

## 2019-10-04 PROCEDURE — 94770 HC ETCO2 MONITOR DAILY: CPT

## 2019-10-04 PROCEDURE — 2580000003 HC RX 258: Performed by: INTERNAL MEDICINE

## 2019-10-04 PROCEDURE — 6360000002 HC RX W HCPCS: Performed by: CLINICAL NURSE SPECIALIST

## 2019-10-04 PROCEDURE — C9113 INJ PANTOPRAZOLE SODIUM, VIA: HCPCS | Performed by: CLINICAL NURSE SPECIALIST

## 2019-10-04 PROCEDURE — 82150 ASSAY OF AMYLASE: CPT

## 2019-10-04 PROCEDURE — 86140 C-REACTIVE PROTEIN: CPT

## 2019-10-04 PROCEDURE — 83735 ASSAY OF MAGNESIUM: CPT

## 2019-10-04 PROCEDURE — 2700000000 HC OXYGEN THERAPY PER DAY

## 2019-10-04 PROCEDURE — 80053 COMPREHEN METABOLIC PANEL: CPT

## 2019-10-04 PROCEDURE — 36415 COLL VENOUS BLD VENIPUNCTURE: CPT

## 2019-10-04 PROCEDURE — 94640 AIRWAY INHALATION TREATMENT: CPT

## 2019-10-04 PROCEDURE — 85025 COMPLETE CBC W/AUTO DIFF WBC: CPT

## 2019-10-04 PROCEDURE — 2580000003 HC RX 258: Performed by: CLINICAL NURSE SPECIALIST

## 2019-10-04 RX ORDER — POTASSIUM CHLORIDE 7.45 MG/ML
10 INJECTION INTRAVENOUS
Status: COMPLETED | OUTPATIENT
Start: 2019-10-04 | End: 2019-10-04

## 2019-10-04 RX ADMIN — LABETALOL HYDROCHLORIDE 10 MG: 5 INJECTION, SOLUTION INTRAVENOUS at 20:08

## 2019-10-04 RX ADMIN — IPRATROPIUM BROMIDE AND ALBUTEROL SULFATE 1 AMPULE: .5; 3 SOLUTION RESPIRATORY (INHALATION) at 19:49

## 2019-10-04 RX ADMIN — IPRATROPIUM BROMIDE AND ALBUTEROL SULFATE 1 AMPULE: .5; 3 SOLUTION RESPIRATORY (INHALATION) at 11:46

## 2019-10-04 RX ADMIN — POTASSIUM CHLORIDE 10 MEQ: 10 INJECTION, SOLUTION INTRAVENOUS at 13:23

## 2019-10-04 RX ADMIN — HYDROMORPHONE HYDROCHLORIDE 0.5 MG: 1 INJECTION, SOLUTION INTRAMUSCULAR; INTRAVENOUS; SUBCUTANEOUS at 16:48

## 2019-10-04 RX ADMIN — SODIUM CHLORIDE, POTASSIUM CHLORIDE, SODIUM LACTATE AND CALCIUM CHLORIDE: 600; 310; 30; 20 INJECTION, SOLUTION INTRAVENOUS at 12:39

## 2019-10-04 RX ADMIN — METOCLOPRAMIDE 5 MG: 5 INJECTION, SOLUTION INTRAMUSCULAR; INTRAVENOUS at 13:48

## 2019-10-04 RX ADMIN — Medication 10 ML: at 20:43

## 2019-10-04 RX ADMIN — POTASSIUM CHLORIDE 10 MEQ: 10 INJECTION, SOLUTION INTRAVENOUS at 15:30

## 2019-10-04 RX ADMIN — HYDROMORPHONE HYDROCHLORIDE 0.5 MG: 1 INJECTION, SOLUTION INTRAMUSCULAR; INTRAVENOUS; SUBCUTANEOUS at 20:48

## 2019-10-04 RX ADMIN — METOCLOPRAMIDE 5 MG: 5 INJECTION, SOLUTION INTRAMUSCULAR; INTRAVENOUS at 20:43

## 2019-10-04 RX ADMIN — IPRATROPIUM BROMIDE AND ALBUTEROL SULFATE 1 AMPULE: .5; 3 SOLUTION RESPIRATORY (INHALATION) at 04:02

## 2019-10-04 RX ADMIN — PANTOPRAZOLE SODIUM 40 MG: 40 INJECTION, POWDER, FOR SOLUTION INTRAVENOUS at 20:43

## 2019-10-04 RX ADMIN — SIMETHICONE 40 MG: 20 SUSPENSION/ DROPS ORAL at 13:48

## 2019-10-04 RX ADMIN — Medication 10 ML: at 08:07

## 2019-10-04 RX ADMIN — POTASSIUM CHLORIDE 10 MEQ: 10 INJECTION, SOLUTION INTRAVENOUS at 14:38

## 2019-10-04 RX ADMIN — SIMETHICONE 40 MG: 20 SUSPENSION/ DROPS ORAL at 16:52

## 2019-10-04 RX ADMIN — IPRATROPIUM BROMIDE AND ALBUTEROL SULFATE 1 AMPULE: .5; 3 SOLUTION RESPIRATORY (INHALATION) at 16:36

## 2019-10-04 RX ADMIN — POTASSIUM CHLORIDE 10 MEQ: 10 INJECTION, SOLUTION INTRAVENOUS at 12:02

## 2019-10-04 RX ADMIN — POTASSIUM CHLORIDE 10 MEQ: 10 INJECTION, SOLUTION INTRAVENOUS at 11:24

## 2019-10-04 RX ADMIN — LABETALOL HYDROCHLORIDE 10 MG: 5 INJECTION, SOLUTION INTRAVENOUS at 18:50

## 2019-10-04 RX ADMIN — Medication 10 ML: at 20:46

## 2019-10-04 RX ADMIN — MEROPENEM 1 G: 1 INJECTION, POWDER, FOR SOLUTION INTRAVENOUS at 08:07

## 2019-10-04 RX ADMIN — PANTOPRAZOLE SODIUM 40 MG: 40 INJECTION, POWDER, FOR SOLUTION INTRAVENOUS at 08:06

## 2019-10-04 RX ADMIN — IPRATROPIUM BROMIDE AND ALBUTEROL SULFATE 1 AMPULE: .5; 3 SOLUTION RESPIRATORY (INHALATION) at 07:50

## 2019-10-04 RX ADMIN — POTASSIUM CHLORIDE 10 MEQ: 10 INJECTION, SOLUTION INTRAVENOUS at 16:48

## 2019-10-04 RX ADMIN — HYDRALAZINE HYDROCHLORIDE 10 MG: 20 INJECTION INTRAMUSCULAR; INTRAVENOUS at 17:06

## 2019-10-04 RX ADMIN — MEROPENEM 1 G: 1 INJECTION, POWDER, FOR SOLUTION INTRAVENOUS at 15:59

## 2019-10-04 RX ADMIN — METOCLOPRAMIDE 5 MG: 5 INJECTION, SOLUTION INTRAMUSCULAR; INTRAVENOUS at 08:05

## 2019-10-04 RX ADMIN — SIMETHICONE 40 MG: 20 SUSPENSION/ DROPS ORAL at 10:00

## 2019-10-04 RX ADMIN — Medication 6 MG: at 03:27

## 2019-10-04 ASSESSMENT — PAIN SCALES - GENERAL
PAINLEVEL_OUTOF10: 8
PAINLEVEL_OUTOF10: 0
PAINLEVEL_OUTOF10: 6

## 2019-10-04 ASSESSMENT — PAIN DESCRIPTION - PAIN TYPE
TYPE: ACUTE PAIN
TYPE: ACUTE PAIN

## 2019-10-04 ASSESSMENT — PAIN DESCRIPTION - LOCATION
LOCATION: ABDOMEN
LOCATION: ABDOMEN;BACK

## 2019-10-04 ASSESSMENT — PAIN DESCRIPTION - PROGRESSION
CLINICAL_PROGRESSION: GRADUALLY WORSENING
CLINICAL_PROGRESSION: GRADUALLY WORSENING

## 2019-10-04 ASSESSMENT — PAIN DESCRIPTION - ONSET
ONSET: ON-GOING
ONSET: ON-GOING

## 2019-10-04 ASSESSMENT — PAIN - FUNCTIONAL ASSESSMENT: PAIN_FUNCTIONAL_ASSESSMENT: PREVENTS OR INTERFERES SOME ACTIVE ACTIVITIES AND ADLS

## 2019-10-04 ASSESSMENT — PAIN DESCRIPTION - FREQUENCY
FREQUENCY: CONTINUOUS
FREQUENCY: CONTINUOUS

## 2019-10-04 ASSESSMENT — PAIN DESCRIPTION - ORIENTATION
ORIENTATION: MID
ORIENTATION: MID

## 2019-10-04 ASSESSMENT — PAIN DESCRIPTION - DESCRIPTORS
DESCRIPTORS: ACHING;DISCOMFORT
DESCRIPTORS: ACHING;DISCOMFORT;DULL

## 2019-10-05 ENCOUNTER — APPOINTMENT (OUTPATIENT)
Dept: ULTRASOUND IMAGING | Age: 39
DRG: 438 | End: 2019-10-05
Attending: INTERNAL MEDICINE
Payer: COMMERCIAL

## 2019-10-05 LAB
ALBUMIN SERPL-MCNC: 2.7 G/DL (ref 3.5–5.2)
ALP BLD-CCNC: 75 U/L (ref 40–129)
ALT SERPL-CCNC: 57 U/L (ref 0–40)
AMYLASE: 30 U/L (ref 20–100)
ANION GAP SERPL CALCULATED.3IONS-SCNC: 13 MMOL/L (ref 7–16)
AST SERPL-CCNC: 72 U/L (ref 0–39)
BASOPHILS ABSOLUTE: 0.03 E9/L (ref 0–0.2)
BASOPHILS RELATIVE PERCENT: 0.2 % (ref 0–2)
BILIRUB SERPL-MCNC: 0.3 MG/DL (ref 0–1.2)
BUN BLDV-MCNC: 6 MG/DL (ref 6–20)
C-REACTIVE PROTEIN: 31.8 MG/DL (ref 0–0.4)
CALCIUM SERPL-MCNC: 8 MG/DL (ref 8.6–10.2)
CHLORIDE BLD-SCNC: 94 MMOL/L (ref 98–107)
CO2: 28 MMOL/L (ref 22–29)
CREAT SERPL-MCNC: 0.8 MG/DL (ref 0.7–1.2)
EOSINOPHILS ABSOLUTE: 0.04 E9/L (ref 0.05–0.5)
EOSINOPHILS RELATIVE PERCENT: 0.2 % (ref 0–6)
GFR AFRICAN AMERICAN: >60
GFR NON-AFRICAN AMERICAN: >60 ML/MIN/1.73
GLUCOSE BLD-MCNC: 117 MG/DL (ref 74–99)
HCT VFR BLD CALC: 34.5 % (ref 37–54)
HEMOGLOBIN: 11 G/DL (ref 12.5–16.5)
IMMATURE GRANULOCYTES #: 0.36 E9/L
IMMATURE GRANULOCYTES %: 2.1 % (ref 0–5)
LIPASE: 74 U/L (ref 13–60)
LYMPHOCYTES ABSOLUTE: 0.73 E9/L (ref 1.5–4)
LYMPHOCYTES RELATIVE PERCENT: 4.3 % (ref 20–42)
MAGNESIUM: 2.4 MG/DL (ref 1.6–2.6)
MCH RBC QN AUTO: 27.5 PG (ref 26–35)
MCHC RBC AUTO-ENTMCNC: 31.9 % (ref 32–34.5)
MCV RBC AUTO: 86.3 FL (ref 80–99.9)
MONOCYTES ABSOLUTE: 1.72 E9/L (ref 0.1–0.95)
MONOCYTES RELATIVE PERCENT: 10.2 % (ref 2–12)
NEUTROPHILS ABSOLUTE: 13.96 E9/L (ref 1.8–7.3)
NEUTROPHILS RELATIVE PERCENT: 83 % (ref 43–80)
OVALOCYTES: ABNORMAL
PDW BLD-RTO: 17.2 FL (ref 11.5–15)
PLATELET # BLD: 314 E9/L (ref 130–450)
PMV BLD AUTO: 10.2 FL (ref 7–12)
POIKILOCYTES: ABNORMAL
POLYCHROMASIA: ABNORMAL
POTASSIUM REFLEX MAGNESIUM: 3.3 MMOL/L (ref 3.5–5)
RBC # BLD: 4 E12/L (ref 3.8–5.8)
SODIUM BLD-SCNC: 135 MMOL/L (ref 132–146)
TOTAL PROTEIN: 5.8 G/DL (ref 6.4–8.3)
WBC # BLD: 16.8 E9/L (ref 4.5–11.5)

## 2019-10-05 PROCEDURE — 93970 EXTREMITY STUDY: CPT

## 2019-10-05 PROCEDURE — 6360000002 HC RX W HCPCS: Performed by: INTERNAL MEDICINE

## 2019-10-05 PROCEDURE — 2580000003 HC RX 258: Performed by: INTERNAL MEDICINE

## 2019-10-05 PROCEDURE — 86140 C-REACTIVE PROTEIN: CPT

## 2019-10-05 PROCEDURE — 2700000000 HC OXYGEN THERAPY PER DAY

## 2019-10-05 PROCEDURE — 6360000002 HC RX W HCPCS: Performed by: CLINICAL NURSE SPECIALIST

## 2019-10-05 PROCEDURE — 36415 COLL VENOUS BLD VENIPUNCTURE: CPT

## 2019-10-05 PROCEDURE — 83690 ASSAY OF LIPASE: CPT

## 2019-10-05 PROCEDURE — 82150 ASSAY OF AMYLASE: CPT

## 2019-10-05 PROCEDURE — C9113 INJ PANTOPRAZOLE SODIUM, VIA: HCPCS | Performed by: CLINICAL NURSE SPECIALIST

## 2019-10-05 PROCEDURE — 6370000000 HC RX 637 (ALT 250 FOR IP): Performed by: INTERNAL MEDICINE

## 2019-10-05 PROCEDURE — 80053 COMPREHEN METABOLIC PANEL: CPT

## 2019-10-05 PROCEDURE — 94640 AIRWAY INHALATION TREATMENT: CPT

## 2019-10-05 PROCEDURE — 2580000003 HC RX 258: Performed by: CLINICAL NURSE SPECIALIST

## 2019-10-05 PROCEDURE — 85025 COMPLETE CBC W/AUTO DIFF WBC: CPT

## 2019-10-05 PROCEDURE — 83735 ASSAY OF MAGNESIUM: CPT

## 2019-10-05 PROCEDURE — 2060000000 HC ICU INTERMEDIATE R&B

## 2019-10-05 PROCEDURE — C9113 INJ PANTOPRAZOLE SODIUM, VIA: HCPCS | Performed by: INTERNAL MEDICINE

## 2019-10-05 RX ORDER — DIMETHICONE, OXYBENZONE, AND PADIMATE O 2; 2.5; 6.6 G/100G; G/100G; G/100G
STICK TOPICAL PRN
Status: DISCONTINUED | OUTPATIENT
Start: 2019-10-05 | End: 2019-10-09 | Stop reason: HOSPADM

## 2019-10-05 RX ORDER — POTASSIUM CHLORIDE 7.45 MG/ML
10 INJECTION INTRAVENOUS
Status: COMPLETED | OUTPATIENT
Start: 2019-10-05 | End: 2019-10-05

## 2019-10-05 RX ORDER — POTASSIUM CHLORIDE 29.8 MG/ML
20 INJECTION INTRAVENOUS
Status: DISCONTINUED | OUTPATIENT
Start: 2019-10-05 | End: 2019-10-05

## 2019-10-05 RX ADMIN — PANTOPRAZOLE SODIUM 40 MG: 40 INJECTION, POWDER, FOR SOLUTION INTRAVENOUS at 08:12

## 2019-10-05 RX ADMIN — Medication 10 ML: at 20:06

## 2019-10-05 RX ADMIN — MEROPENEM 1 G: 1 INJECTION, POWDER, FOR SOLUTION INTRAVENOUS at 00:35

## 2019-10-05 RX ADMIN — HYDROMORPHONE HYDROCHLORIDE 0.5 MG: 1 INJECTION, SOLUTION INTRAMUSCULAR; INTRAVENOUS; SUBCUTANEOUS at 08:30

## 2019-10-05 RX ADMIN — MEROPENEM 1 G: 1 INJECTION, POWDER, FOR SOLUTION INTRAVENOUS at 15:55

## 2019-10-05 RX ADMIN — SODIUM CHLORIDE, POTASSIUM CHLORIDE, SODIUM LACTATE AND CALCIUM CHLORIDE: 600; 310; 30; 20 INJECTION, SOLUTION INTRAVENOUS at 04:29

## 2019-10-05 RX ADMIN — IPRATROPIUM BROMIDE AND ALBUTEROL SULFATE 1 AMPULE: .5; 3 SOLUTION RESPIRATORY (INHALATION) at 08:06

## 2019-10-05 RX ADMIN — HYDROMORPHONE HYDROCHLORIDE 0.5 MG: 1 INJECTION, SOLUTION INTRAMUSCULAR; INTRAVENOUS; SUBCUTANEOUS at 12:38

## 2019-10-05 RX ADMIN — SODIUM CHLORIDE, POTASSIUM CHLORIDE, SODIUM LACTATE AND CALCIUM CHLORIDE: 600; 310; 30; 20 INJECTION, SOLUTION INTRAVENOUS at 16:39

## 2019-10-05 RX ADMIN — HYDROMORPHONE HYDROCHLORIDE 0.5 MG: 1 INJECTION, SOLUTION INTRAMUSCULAR; INTRAVENOUS; SUBCUTANEOUS at 16:39

## 2019-10-05 RX ADMIN — METOCLOPRAMIDE 5 MG: 5 INJECTION, SOLUTION INTRAMUSCULAR; INTRAVENOUS at 03:13

## 2019-10-05 RX ADMIN — Medication 10 ML: at 20:00

## 2019-10-05 RX ADMIN — MEROPENEM 1 G: 1 INJECTION, POWDER, FOR SOLUTION INTRAVENOUS at 08:12

## 2019-10-05 RX ADMIN — HYDROMORPHONE HYDROCHLORIDE 0.5 MG: 1 INJECTION, SOLUTION INTRAMUSCULAR; INTRAVENOUS; SUBCUTANEOUS at 04:30

## 2019-10-05 RX ADMIN — METOCLOPRAMIDE 5 MG: 5 INJECTION, SOLUTION INTRAMUSCULAR; INTRAVENOUS at 15:55

## 2019-10-05 RX ADMIN — SIMETHICONE 40 MG: 20 SUSPENSION/ DROPS ORAL at 20:00

## 2019-10-05 RX ADMIN — Medication 10 ML: at 08:12

## 2019-10-05 RX ADMIN — PANTOPRAZOLE SODIUM 40 MG: 40 INJECTION, POWDER, FOR SOLUTION INTRAVENOUS at 20:00

## 2019-10-05 RX ADMIN — POTASSIUM CHLORIDE 10 MEQ: 7.46 INJECTION, SOLUTION INTRAVENOUS at 11:55

## 2019-10-05 RX ADMIN — IPRATROPIUM BROMIDE AND ALBUTEROL SULFATE 1 AMPULE: .5; 3 SOLUTION RESPIRATORY (INHALATION) at 11:22

## 2019-10-05 RX ADMIN — POTASSIUM CHLORIDE 10 MEQ: 7.46 INJECTION, SOLUTION INTRAVENOUS at 10:55

## 2019-10-05 RX ADMIN — IPRATROPIUM BROMIDE AND ALBUTEROL SULFATE 1 AMPULE: .5; 3 SOLUTION RESPIRATORY (INHALATION) at 16:02

## 2019-10-05 RX ADMIN — HYDROMORPHONE HYDROCHLORIDE 0.5 MG: 1 INJECTION, SOLUTION INTRAMUSCULAR; INTRAVENOUS; SUBCUTANEOUS at 20:00

## 2019-10-05 RX ADMIN — METOCLOPRAMIDE 5 MG: 5 INJECTION, SOLUTION INTRAMUSCULAR; INTRAVENOUS at 08:12

## 2019-10-05 RX ADMIN — SIMETHICONE 40 MG: 20 SUSPENSION/ DROPS ORAL at 08:14

## 2019-10-05 RX ADMIN — METOCLOPRAMIDE 5 MG: 5 INJECTION, SOLUTION INTRAMUSCULAR; INTRAVENOUS at 19:52

## 2019-10-05 RX ADMIN — HYDROMORPHONE HYDROCHLORIDE 0.5 MG: 1 INJECTION, SOLUTION INTRAMUSCULAR; INTRAVENOUS; SUBCUTANEOUS at 00:35

## 2019-10-05 RX ADMIN — Medication 10 ML: at 08:13

## 2019-10-05 RX ADMIN — POTASSIUM CHLORIDE 10 MEQ: 7.46 INJECTION, SOLUTION INTRAVENOUS at 12:55

## 2019-10-05 RX ADMIN — POTASSIUM CHLORIDE 10 MEQ: 7.46 INJECTION, SOLUTION INTRAVENOUS at 09:40

## 2019-10-05 RX ADMIN — MEROPENEM 1 G: 1 INJECTION, POWDER, FOR SOLUTION INTRAVENOUS at 23:51

## 2019-10-05 ASSESSMENT — PAIN SCALES - GENERAL
PAINLEVEL_OUTOF10: 0
PAINLEVEL_OUTOF10: 0
PAINLEVEL_OUTOF10: 8
PAINLEVEL_OUTOF10: 9
PAINLEVEL_OUTOF10: 2
PAINLEVEL_OUTOF10: 6
PAINLEVEL_OUTOF10: 8
PAINLEVEL_OUTOF10: 6
PAINLEVEL_OUTOF10: 0
PAINLEVEL_OUTOF10: 0
PAINLEVEL_OUTOF10: 9
PAINLEVEL_OUTOF10: 2
PAINLEVEL_OUTOF10: 0

## 2019-10-05 ASSESSMENT — PAIN DESCRIPTION - ORIENTATION
ORIENTATION: RIGHT;LEFT;LOWER;MID
ORIENTATION: RIGHT;LEFT;LOWER
ORIENTATION: MID
ORIENTATION: MID

## 2019-10-05 ASSESSMENT — PAIN DESCRIPTION - PAIN TYPE
TYPE: ACUTE PAIN

## 2019-10-05 ASSESSMENT — PAIN DESCRIPTION - ONSET
ONSET: GRADUAL
ONSET: ON-GOING
ONSET: ON-GOING

## 2019-10-05 ASSESSMENT — PAIN DESCRIPTION - PROGRESSION
CLINICAL_PROGRESSION: GRADUALLY WORSENING

## 2019-10-05 ASSESSMENT — PAIN DESCRIPTION - LOCATION
LOCATION: ABDOMEN

## 2019-10-05 ASSESSMENT — PAIN - FUNCTIONAL ASSESSMENT
PAIN_FUNCTIONAL_ASSESSMENT: PREVENTS OR INTERFERES SOME ACTIVE ACTIVITIES AND ADLS

## 2019-10-05 ASSESSMENT — PAIN DESCRIPTION - FREQUENCY
FREQUENCY: CONTINUOUS
FREQUENCY: INTERMITTENT
FREQUENCY: CONTINUOUS

## 2019-10-05 ASSESSMENT — PAIN DESCRIPTION - DESCRIPTORS
DESCRIPTORS: ACHING;DISCOMFORT;NAGGING
DESCRIPTORS: ACHING;DISCOMFORT;DULL
DESCRIPTORS: ACHING;DISCOMFORT;DULL
DESCRIPTORS: ACHING;DISCOMFORT;NAGGING
DESCRIPTORS: ACHING;DISCOMFORT;NAGGING
DESCRIPTORS: ACHING;DISCOMFORT;DULL

## 2019-10-05 ASSESSMENT — PAIN DESCRIPTION - DIRECTION: RADIATING_TOWARDS: BACK

## 2019-10-06 LAB
ALBUMIN SERPL-MCNC: 2.4 G/DL (ref 3.5–5.2)
ALP BLD-CCNC: 81 U/L (ref 40–129)
ALT SERPL-CCNC: 72 U/L (ref 0–40)
AMYLASE: 33 U/L (ref 20–100)
ANION GAP SERPL CALCULATED.3IONS-SCNC: 11 MMOL/L (ref 7–16)
ANISOCYTOSIS: ABNORMAL
AST SERPL-CCNC: 89 U/L (ref 0–39)
BASOPHILS ABSOLUTE: 0.05 E9/L (ref 0–0.2)
BASOPHILS RELATIVE PERCENT: 0.3 % (ref 0–2)
BILIRUB SERPL-MCNC: 0.3 MG/DL (ref 0–1.2)
BUN BLDV-MCNC: 7 MG/DL (ref 6–20)
C-REACTIVE PROTEIN: 29 MG/DL (ref 0–0.4)
CALCIUM SERPL-MCNC: 7.9 MG/DL (ref 8.6–10.2)
CHLORIDE BLD-SCNC: 95 MMOL/L (ref 98–107)
CO2: 27 MMOL/L (ref 22–29)
CREAT SERPL-MCNC: 0.8 MG/DL (ref 0.7–1.2)
DOHLE BODIES: ABNORMAL
EOSINOPHILS ABSOLUTE: 0.03 E9/L (ref 0.05–0.5)
EOSINOPHILS RELATIVE PERCENT: 0.2 % (ref 0–6)
GFR AFRICAN AMERICAN: >60
GFR NON-AFRICAN AMERICAN: >60 ML/MIN/1.73
GLUCOSE BLD-MCNC: 126 MG/DL (ref 74–99)
HCT VFR BLD CALC: 34.1 % (ref 37–54)
HEMOGLOBIN: 10.9 G/DL (ref 12.5–16.5)
IMMATURE GRANULOCYTES #: 0.27 E9/L
IMMATURE GRANULOCYTES %: 1.5 % (ref 0–5)
LIPASE: 106 U/L (ref 13–60)
LYMPHOCYTES ABSOLUTE: 0.65 E9/L (ref 1.5–4)
LYMPHOCYTES RELATIVE PERCENT: 3.6 % (ref 20–42)
MAGNESIUM: 2.2 MG/DL (ref 1.6–2.6)
MCH RBC QN AUTO: 27.8 PG (ref 26–35)
MCHC RBC AUTO-ENTMCNC: 32 % (ref 32–34.5)
MCV RBC AUTO: 87 FL (ref 80–99.9)
MONOCYTES ABSOLUTE: 1.77 E9/L (ref 0.1–0.95)
MONOCYTES RELATIVE PERCENT: 9.9 % (ref 2–12)
NEUTROPHILS ABSOLUTE: 15.16 E9/L (ref 1.8–7.3)
NEUTROPHILS RELATIVE PERCENT: 84.5 % (ref 43–80)
OVALOCYTES: ABNORMAL
PDW BLD-RTO: 17.5 FL (ref 11.5–15)
PLATELET # BLD: 303 E9/L (ref 130–450)
PMV BLD AUTO: 9.9 FL (ref 7–12)
POIKILOCYTES: ABNORMAL
POLYCHROMASIA: ABNORMAL
POTASSIUM REFLEX MAGNESIUM: 3.4 MMOL/L (ref 3.5–5)
RBC # BLD: 3.92 E12/L (ref 3.8–5.8)
SODIUM BLD-SCNC: 133 MMOL/L (ref 132–146)
TOTAL PROTEIN: 5.5 G/DL (ref 6.4–8.3)
TOXIC GRANULATION: ABNORMAL
WBC # BLD: 17.9 E9/L (ref 4.5–11.5)

## 2019-10-06 PROCEDURE — 83690 ASSAY OF LIPASE: CPT

## 2019-10-06 PROCEDURE — 94640 AIRWAY INHALATION TREATMENT: CPT

## 2019-10-06 PROCEDURE — 2700000000 HC OXYGEN THERAPY PER DAY

## 2019-10-06 PROCEDURE — 36415 COLL VENOUS BLD VENIPUNCTURE: CPT

## 2019-10-06 PROCEDURE — 6360000002 HC RX W HCPCS: Performed by: INTERNAL MEDICINE

## 2019-10-06 PROCEDURE — 6370000000 HC RX 637 (ALT 250 FOR IP): Performed by: INTERNAL MEDICINE

## 2019-10-06 PROCEDURE — 83735 ASSAY OF MAGNESIUM: CPT

## 2019-10-06 PROCEDURE — 2580000003 HC RX 258: Performed by: INTERNAL MEDICINE

## 2019-10-06 PROCEDURE — C9113 INJ PANTOPRAZOLE SODIUM, VIA: HCPCS | Performed by: INTERNAL MEDICINE

## 2019-10-06 PROCEDURE — 86140 C-REACTIVE PROTEIN: CPT

## 2019-10-06 PROCEDURE — 2060000000 HC ICU INTERMEDIATE R&B

## 2019-10-06 PROCEDURE — 82150 ASSAY OF AMYLASE: CPT

## 2019-10-06 PROCEDURE — 80053 COMPREHEN METABOLIC PANEL: CPT

## 2019-10-06 PROCEDURE — 6370000000 HC RX 637 (ALT 250 FOR IP): Performed by: NURSE PRACTITIONER

## 2019-10-06 PROCEDURE — 85025 COMPLETE CBC W/AUTO DIFF WBC: CPT

## 2019-10-06 RX ORDER — POTASSIUM CHLORIDE 20 MEQ/1
20 TABLET, EXTENDED RELEASE ORAL ONCE
Status: COMPLETED | OUTPATIENT
Start: 2019-10-06 | End: 2019-10-06

## 2019-10-06 RX ADMIN — IPRATROPIUM BROMIDE AND ALBUTEROL SULFATE 1 AMPULE: .5; 3 SOLUTION RESPIRATORY (INHALATION) at 15:23

## 2019-10-06 RX ADMIN — ACETAMINOPHEN 650 MG: 325 TABLET ORAL at 03:07

## 2019-10-06 RX ADMIN — Medication 10 ML: at 20:40

## 2019-10-06 RX ADMIN — MEROPENEM 1 G: 1 INJECTION, POWDER, FOR SOLUTION INTRAVENOUS at 15:37

## 2019-10-06 RX ADMIN — IPRATROPIUM BROMIDE AND ALBUTEROL SULFATE 1 AMPULE: .5; 3 SOLUTION RESPIRATORY (INHALATION) at 11:19

## 2019-10-06 RX ADMIN — PANTOPRAZOLE SODIUM 40 MG: 40 INJECTION, POWDER, FOR SOLUTION INTRAVENOUS at 20:41

## 2019-10-06 RX ADMIN — Medication 10 ML: at 08:13

## 2019-10-06 RX ADMIN — ACETAMINOPHEN 650 MG: 325 TABLET ORAL at 06:07

## 2019-10-06 RX ADMIN — SIMETHICONE 40 MG: 20 SUSPENSION/ DROPS ORAL at 14:12

## 2019-10-06 RX ADMIN — PANTOPRAZOLE SODIUM 40 MG: 40 INJECTION, POWDER, FOR SOLUTION INTRAVENOUS at 08:12

## 2019-10-06 RX ADMIN — HYDROMORPHONE HYDROCHLORIDE 0.5 MG: 1 INJECTION, SOLUTION INTRAMUSCULAR; INTRAVENOUS; SUBCUTANEOUS at 21:30

## 2019-10-06 RX ADMIN — POTASSIUM CHLORIDE 20 MEQ: 20 TABLET, EXTENDED RELEASE ORAL at 20:41

## 2019-10-06 RX ADMIN — HYDROMORPHONE HYDROCHLORIDE 0.5 MG: 1 INJECTION, SOLUTION INTRAMUSCULAR; INTRAVENOUS; SUBCUTANEOUS at 17:28

## 2019-10-06 RX ADMIN — METOCLOPRAMIDE 5 MG: 5 INJECTION, SOLUTION INTRAMUSCULAR; INTRAVENOUS at 08:12

## 2019-10-06 RX ADMIN — HYDROMORPHONE HYDROCHLORIDE 0.5 MG: 1 INJECTION, SOLUTION INTRAMUSCULAR; INTRAVENOUS; SUBCUTANEOUS at 08:12

## 2019-10-06 RX ADMIN — ACETAMINOPHEN 650 MG: 325 TABLET ORAL at 20:41

## 2019-10-06 RX ADMIN — METOCLOPRAMIDE 5 MG: 5 INJECTION, SOLUTION INTRAMUSCULAR; INTRAVENOUS at 03:15

## 2019-10-06 RX ADMIN — IPRATROPIUM BROMIDE AND ALBUTEROL SULFATE 1 AMPULE: .5; 3 SOLUTION RESPIRATORY (INHALATION) at 08:42

## 2019-10-06 RX ADMIN — HYDROMORPHONE HYDROCHLORIDE 0.5 MG: 1 INJECTION, SOLUTION INTRAMUSCULAR; INTRAVENOUS; SUBCUTANEOUS at 00:00

## 2019-10-06 RX ADMIN — PANCRELIPASE 3 CAPSULE: 60000; 12000; 38000 CAPSULE, DELAYED RELEASE PELLETS ORAL at 17:28

## 2019-10-06 RX ADMIN — ACETAMINOPHEN 650 MG: 325 TABLET ORAL at 10:22

## 2019-10-06 RX ADMIN — ENOXAPARIN SODIUM 40 MG: 40 INJECTION SUBCUTANEOUS at 15:37

## 2019-10-06 RX ADMIN — HYDROMORPHONE HYDROCHLORIDE 0.5 MG: 1 INJECTION, SOLUTION INTRAMUSCULAR; INTRAVENOUS; SUBCUTANEOUS at 03:59

## 2019-10-06 RX ADMIN — HYDROMORPHONE HYDROCHLORIDE 0.5 MG: 1 INJECTION, SOLUTION INTRAMUSCULAR; INTRAVENOUS; SUBCUTANEOUS at 12:16

## 2019-10-06 RX ADMIN — METOCLOPRAMIDE 5 MG: 5 INJECTION, SOLUTION INTRAMUSCULAR; INTRAVENOUS at 20:41

## 2019-10-06 RX ADMIN — SIMETHICONE 40 MG: 20 SUSPENSION/ DROPS ORAL at 17:28

## 2019-10-06 RX ADMIN — MEROPENEM 1 G: 1 INJECTION, POWDER, FOR SOLUTION INTRAVENOUS at 08:12

## 2019-10-06 RX ADMIN — METOCLOPRAMIDE 5 MG: 5 INJECTION, SOLUTION INTRAMUSCULAR; INTRAVENOUS at 14:13

## 2019-10-06 RX ADMIN — ACETAMINOPHEN 650 MG: 325 TABLET ORAL at 14:12

## 2019-10-06 ASSESSMENT — PAIN SCALES - GENERAL
PAINLEVEL_OUTOF10: 7
PAINLEVEL_OUTOF10: 6
PAINLEVEL_OUTOF10: 7
PAINLEVEL_OUTOF10: 5
PAINLEVEL_OUTOF10: 7
PAINLEVEL_OUTOF10: 5
PAINLEVEL_OUTOF10: 4
PAINLEVEL_OUTOF10: 0
PAINLEVEL_OUTOF10: 5
PAINLEVEL_OUTOF10: 5
PAINLEVEL_OUTOF10: 6
PAINLEVEL_OUTOF10: 6
PAINLEVEL_OUTOF10: 0
PAINLEVEL_OUTOF10: 7
PAINLEVEL_OUTOF10: 2
PAINLEVEL_OUTOF10: 4

## 2019-10-06 ASSESSMENT — PAIN DESCRIPTION - ONSET
ONSET: GRADUAL
ONSET: GRADUAL

## 2019-10-06 ASSESSMENT — PAIN DESCRIPTION - PROGRESSION
CLINICAL_PROGRESSION: NOT CHANGED
CLINICAL_PROGRESSION: GRADUALLY WORSENING

## 2019-10-06 ASSESSMENT — PAIN DESCRIPTION - PAIN TYPE
TYPE: ACUTE PAIN
TYPE: ACUTE PAIN

## 2019-10-06 ASSESSMENT — PAIN DESCRIPTION - ORIENTATION
ORIENTATION: RIGHT;LEFT;LOWER
ORIENTATION: RIGHT;LEFT;LOWER;MID

## 2019-10-06 ASSESSMENT — PAIN DESCRIPTION - FREQUENCY
FREQUENCY: INTERMITTENT
FREQUENCY: INTERMITTENT

## 2019-10-06 ASSESSMENT — PAIN DESCRIPTION - DIRECTION: RADIATING_TOWARDS: BACK

## 2019-10-06 ASSESSMENT — PAIN DESCRIPTION - LOCATION
LOCATION: ABDOMEN;BACK
LOCATION: ABDOMEN

## 2019-10-06 ASSESSMENT — PAIN DESCRIPTION - DESCRIPTORS
DESCRIPTORS: ACHING;CRAMPING;DISCOMFORT
DESCRIPTORS: ACHING;DISCOMFORT;DULL

## 2019-10-07 ENCOUNTER — APPOINTMENT (OUTPATIENT)
Dept: GENERAL RADIOLOGY | Age: 39
DRG: 438 | End: 2019-10-07
Attending: INTERNAL MEDICINE
Payer: COMMERCIAL

## 2019-10-07 ENCOUNTER — APPOINTMENT (OUTPATIENT)
Dept: CT IMAGING | Age: 39
DRG: 438 | End: 2019-10-07
Attending: INTERNAL MEDICINE
Payer: COMMERCIAL

## 2019-10-07 LAB
ALBUMIN SERPL-MCNC: 2.6 G/DL (ref 3.5–5.2)
ALP BLD-CCNC: 94 U/L (ref 40–129)
ALT SERPL-CCNC: 53 U/L (ref 0–40)
AMYLASE: 37 U/L (ref 20–100)
ANION GAP SERPL CALCULATED.3IONS-SCNC: 10 MMOL/L (ref 7–16)
ANION GAP SERPL CALCULATED.3IONS-SCNC: 12 MMOL/L (ref 7–16)
AST SERPL-CCNC: 47 U/L (ref 0–39)
BASOPHILS ABSOLUTE: 0.06 E9/L (ref 0–0.2)
BASOPHILS RELATIVE PERCENT: 0.3 % (ref 0–2)
BILIRUB SERPL-MCNC: 0.4 MG/DL (ref 0–1.2)
BUN BLDV-MCNC: 6 MG/DL (ref 6–20)
BUN BLDV-MCNC: 6 MG/DL (ref 6–20)
C-REACTIVE PROTEIN: 32.8 MG/DL (ref 0–0.4)
CALCIUM SERPL-MCNC: 8 MG/DL (ref 8.6–10.2)
CALCIUM SERPL-MCNC: 8.2 MG/DL (ref 8.6–10.2)
CHLORIDE BLD-SCNC: 94 MMOL/L (ref 98–107)
CHLORIDE BLD-SCNC: 99 MMOL/L (ref 98–107)
CO2: 29 MMOL/L (ref 22–29)
CO2: 31 MMOL/L (ref 22–29)
CREAT SERPL-MCNC: 0.9 MG/DL (ref 0.7–1.2)
CREAT SERPL-MCNC: 0.9 MG/DL (ref 0.7–1.2)
DOHLE BODIES: ABNORMAL
EOSINOPHILS ABSOLUTE: 0.04 E9/L (ref 0.05–0.5)
EOSINOPHILS RELATIVE PERCENT: 0.2 % (ref 0–6)
GFR AFRICAN AMERICAN: >60
GFR AFRICAN AMERICAN: >60
GFR NON-AFRICAN AMERICAN: >60 ML/MIN/1.73
GFR NON-AFRICAN AMERICAN: >60 ML/MIN/1.73
GLUCOSE BLD-MCNC: 118 MG/DL (ref 74–99)
GLUCOSE BLD-MCNC: 176 MG/DL (ref 74–99)
HCT VFR BLD CALC: 33.7 % (ref 37–54)
HEMOGLOBIN: 10.9 G/DL (ref 12.5–16.5)
IMMATURE GRANULOCYTES #: 0.36 E9/L
IMMATURE GRANULOCYTES %: 1.8 % (ref 0–5)
LIPASE: 115 U/L (ref 13–60)
LYMPHOCYTES ABSOLUTE: 0.88 E9/L (ref 1.5–4)
LYMPHOCYTES RELATIVE PERCENT: 4.4 % (ref 20–42)
MAGNESIUM: 2.2 MG/DL (ref 1.6–2.6)
MCH RBC QN AUTO: 28.1 PG (ref 26–35)
MCHC RBC AUTO-ENTMCNC: 32.3 % (ref 32–34.5)
MCV RBC AUTO: 86.9 FL (ref 80–99.9)
MONOCYTES ABSOLUTE: 1.72 E9/L (ref 0.1–0.95)
MONOCYTES RELATIVE PERCENT: 8.6 % (ref 2–12)
NEUTROPHILS ABSOLUTE: 16.96 E9/L (ref 1.8–7.3)
NEUTROPHILS RELATIVE PERCENT: 84.7 % (ref 43–80)
PDW BLD-RTO: 18 FL (ref 11.5–15)
PLATELET # BLD: 382 E9/L (ref 130–450)
PMV BLD AUTO: 10.3 FL (ref 7–12)
POLYCHROMASIA: ABNORMAL
POTASSIUM REFLEX MAGNESIUM: 3.4 MMOL/L (ref 3.5–5)
POTASSIUM REFLEX MAGNESIUM: 3.7 MMOL/L (ref 3.5–5)
RBC # BLD: 3.88 E12/L (ref 3.8–5.8)
SODIUM BLD-SCNC: 137 MMOL/L (ref 132–146)
SODIUM BLD-SCNC: 138 MMOL/L (ref 132–146)
TOTAL PROTEIN: 5.8 G/DL (ref 6.4–8.3)
TOXIC GRANULATION: ABNORMAL
WBC # BLD: 20 E9/L (ref 4.5–11.5)

## 2019-10-07 PROCEDURE — 71046 X-RAY EXAM CHEST 2 VIEWS: CPT

## 2019-10-07 PROCEDURE — 6360000002 HC RX W HCPCS: Performed by: INTERNAL MEDICINE

## 2019-10-07 PROCEDURE — 85025 COMPLETE CBC W/AUTO DIFF WBC: CPT

## 2019-10-07 PROCEDURE — 82150 ASSAY OF AMYLASE: CPT

## 2019-10-07 PROCEDURE — 6360000002 HC RX W HCPCS: Performed by: TRANSPLANT SURGERY

## 2019-10-07 PROCEDURE — 6370000000 HC RX 637 (ALT 250 FOR IP): Performed by: INTERNAL MEDICINE

## 2019-10-07 PROCEDURE — 2580000003 HC RX 258: Performed by: INTERNAL MEDICINE

## 2019-10-07 PROCEDURE — 6370000000 HC RX 637 (ALT 250 FOR IP): Performed by: TRANSPLANT SURGERY

## 2019-10-07 PROCEDURE — 83690 ASSAY OF LIPASE: CPT

## 2019-10-07 PROCEDURE — 80048 BASIC METABOLIC PNL TOTAL CA: CPT

## 2019-10-07 PROCEDURE — C9113 INJ PANTOPRAZOLE SODIUM, VIA: HCPCS | Performed by: INTERNAL MEDICINE

## 2019-10-07 PROCEDURE — 36415 COLL VENOUS BLD VENIPUNCTURE: CPT

## 2019-10-07 PROCEDURE — 99254 IP/OBS CNSLTJ NEW/EST MOD 60: CPT | Performed by: TRANSPLANT SURGERY

## 2019-10-07 PROCEDURE — 83735 ASSAY OF MAGNESIUM: CPT

## 2019-10-07 PROCEDURE — 2060000000 HC ICU INTERMEDIATE R&B

## 2019-10-07 PROCEDURE — 2700000000 HC OXYGEN THERAPY PER DAY

## 2019-10-07 PROCEDURE — 6370000000 HC RX 637 (ALT 250 FOR IP): Performed by: NURSE PRACTITIONER

## 2019-10-07 PROCEDURE — 80053 COMPREHEN METABOLIC PANEL: CPT

## 2019-10-07 PROCEDURE — 6360000002 HC RX W HCPCS: Performed by: EMERGENCY MEDICINE

## 2019-10-07 PROCEDURE — 94640 AIRWAY INHALATION TREATMENT: CPT

## 2019-10-07 PROCEDURE — 74178 CT ABD&PLV WO CNTR FLWD CNTR: CPT

## 2019-10-07 PROCEDURE — 86140 C-REACTIVE PROTEIN: CPT

## 2019-10-07 PROCEDURE — 6360000004 HC RX CONTRAST MEDICATION: Performed by: RADIOLOGY

## 2019-10-07 RX ORDER — BISACODYL 10 MG
10 SUPPOSITORY, RECTAL RECTAL ONCE
Status: COMPLETED | OUTPATIENT
Start: 2019-10-07 | End: 2019-10-07

## 2019-10-07 RX ORDER — KETOROLAC TROMETHAMINE 30 MG/ML
15 INJECTION, SOLUTION INTRAMUSCULAR; INTRAVENOUS EVERY 6 HOURS
Status: DISCONTINUED | OUTPATIENT
Start: 2019-10-07 | End: 2019-10-09 | Stop reason: HOSPADM

## 2019-10-07 RX ORDER — OXYCODONE HYDROCHLORIDE 5 MG/1
5 TABLET ORAL EVERY 4 HOURS PRN
Status: DISCONTINUED | OUTPATIENT
Start: 2019-10-07 | End: 2019-10-09 | Stop reason: HOSPADM

## 2019-10-07 RX ORDER — FUROSEMIDE 10 MG/ML
20 INJECTION INTRAMUSCULAR; INTRAVENOUS ONCE
Status: COMPLETED | OUTPATIENT
Start: 2019-10-07 | End: 2019-10-07

## 2019-10-07 RX ORDER — ACETAMINOPHEN 500 MG
500 TABLET ORAL EVERY 6 HOURS
Status: DISCONTINUED | OUTPATIENT
Start: 2019-10-07 | End: 2019-10-08

## 2019-10-07 RX ADMIN — ACETAMINOPHEN 500 MG: 500 TABLET ORAL at 19:04

## 2019-10-07 RX ADMIN — METOCLOPRAMIDE 5 MG: 5 INJECTION, SOLUTION INTRAMUSCULAR; INTRAVENOUS at 08:45

## 2019-10-07 RX ADMIN — IPRATROPIUM BROMIDE AND ALBUTEROL SULFATE 1 AMPULE: .5; 3 SOLUTION RESPIRATORY (INHALATION) at 10:20

## 2019-10-07 RX ADMIN — Medication 10 ML: at 21:39

## 2019-10-07 RX ADMIN — Medication 10 ML: at 13:37

## 2019-10-07 RX ADMIN — PANCRELIPASE 3 CAPSULE: 60000; 12000; 38000 CAPSULE, DELAYED RELEASE PELLETS ORAL at 12:17

## 2019-10-07 RX ADMIN — IPRATROPIUM BROMIDE AND ALBUTEROL SULFATE 1 AMPULE: .5; 3 SOLUTION RESPIRATORY (INHALATION) at 16:21

## 2019-10-07 RX ADMIN — Medication 10 ML: at 16:11

## 2019-10-07 RX ADMIN — ACETAMINOPHEN 650 MG: 325 TABLET ORAL at 04:41

## 2019-10-07 RX ADMIN — HYDROMORPHONE HYDROCHLORIDE 0.5 MG: 1 INJECTION, SOLUTION INTRAMUSCULAR; INTRAVENOUS; SUBCUTANEOUS at 01:15

## 2019-10-07 RX ADMIN — Medication 10 ML: at 08:47

## 2019-10-07 RX ADMIN — Medication 10 ML: at 09:34

## 2019-10-07 RX ADMIN — PANCRELIPASE 3 CAPSULE: 60000; 12000; 38000 CAPSULE, DELAYED RELEASE PELLETS ORAL at 09:34

## 2019-10-07 RX ADMIN — MEROPENEM 1 G: 1 INJECTION, POWDER, FOR SOLUTION INTRAVENOUS at 16:11

## 2019-10-07 RX ADMIN — PANTOPRAZOLE SODIUM 40 MG: 40 INJECTION, POWDER, FOR SOLUTION INTRAVENOUS at 08:45

## 2019-10-07 RX ADMIN — HYDROMORPHONE HYDROCHLORIDE 0.5 MG: 1 INJECTION, SOLUTION INTRAMUSCULAR; INTRAVENOUS; SUBCUTANEOUS at 05:16

## 2019-10-07 RX ADMIN — ACETAMINOPHEN 650 MG: 325 TABLET ORAL at 00:50

## 2019-10-07 RX ADMIN — Medication 10 ML: at 20:39

## 2019-10-07 RX ADMIN — METOCLOPRAMIDE 5 MG: 5 INJECTION, SOLUTION INTRAMUSCULAR; INTRAVENOUS at 01:49

## 2019-10-07 RX ADMIN — OXYCODONE HYDROCHLORIDE 5 MG: 5 TABLET ORAL at 13:37

## 2019-10-07 RX ADMIN — HYDROMORPHONE HYDROCHLORIDE 0.5 MG: 1 INJECTION, SOLUTION INTRAMUSCULAR; INTRAVENOUS; SUBCUTANEOUS at 15:24

## 2019-10-07 RX ADMIN — MEROPENEM 1 G: 1 INJECTION, POWDER, FOR SOLUTION INTRAVENOUS at 00:50

## 2019-10-07 RX ADMIN — ACETAMINOPHEN 650 MG: 325 TABLET ORAL at 12:23

## 2019-10-07 RX ADMIN — IOPAMIDOL 110 ML: 755 INJECTION, SOLUTION INTRAVENOUS at 09:07

## 2019-10-07 RX ADMIN — HYDROMORPHONE HYDROCHLORIDE 0.5 MG: 1 INJECTION, SOLUTION INTRAMUSCULAR; INTRAVENOUS; SUBCUTANEOUS at 21:39

## 2019-10-07 RX ADMIN — MEROPENEM 1 G: 1 INJECTION, POWDER, FOR SOLUTION INTRAVENOUS at 08:45

## 2019-10-07 RX ADMIN — KETOROLAC TROMETHAMINE 15 MG: 30 INJECTION, SOLUTION INTRAMUSCULAR; INTRAVENOUS at 13:37

## 2019-10-07 RX ADMIN — IPRATROPIUM BROMIDE AND ALBUTEROL SULFATE 1 AMPULE: .5; 3 SOLUTION RESPIRATORY (INHALATION) at 13:30

## 2019-10-07 RX ADMIN — BISACODYL 10 MG: 10 SUPPOSITORY RECTAL at 10:32

## 2019-10-07 RX ADMIN — HYDROMORPHONE HYDROCHLORIDE 0.5 MG: 1 INJECTION, SOLUTION INTRAMUSCULAR; INTRAVENOUS; SUBCUTANEOUS at 09:34

## 2019-10-07 RX ADMIN — IPRATROPIUM BROMIDE AND ALBUTEROL SULFATE 1 AMPULE: .5; 3 SOLUTION RESPIRATORY (INHALATION) at 21:22

## 2019-10-07 RX ADMIN — ENOXAPARIN SODIUM 40 MG: 40 INJECTION SUBCUTANEOUS at 08:45

## 2019-10-07 RX ADMIN — KETOROLAC TROMETHAMINE 15 MG: 30 INJECTION, SOLUTION INTRAMUSCULAR; INTRAVENOUS at 20:37

## 2019-10-07 RX ADMIN — PANTOPRAZOLE SODIUM 40 MG: 40 INJECTION, POWDER, FOR SOLUTION INTRAVENOUS at 20:38

## 2019-10-07 RX ADMIN — Medication 10 ML: at 08:46

## 2019-10-07 RX ADMIN — PANCRELIPASE 3 CAPSULE: 60000; 12000; 38000 CAPSULE, DELAYED RELEASE PELLETS ORAL at 16:11

## 2019-10-07 RX ADMIN — FUROSEMIDE 20 MG: 10 INJECTION, SOLUTION INTRAVENOUS at 11:32

## 2019-10-07 ASSESSMENT — PAIN DESCRIPTION - ORIENTATION
ORIENTATION: RIGHT;LEFT;LOWER;OUTER
ORIENTATION: RIGHT;LEFT;LOWER
ORIENTATION: RIGHT;LEFT;LOWER

## 2019-10-07 ASSESSMENT — PAIN DESCRIPTION - DESCRIPTORS
DESCRIPTORS: ACHING;CRAMPING;DISCOMFORT
DESCRIPTORS: ACHING;CRAMPING;DISCOMFORT

## 2019-10-07 ASSESSMENT — ENCOUNTER SYMPTOMS
BACK PAIN: 0
DIARRHEA: 0
BLOOD IN STOOL: 0
VOMITING: 0
SHORTNESS OF BREATH: 0
NAUSEA: 0
EYE PAIN: 0
ABDOMINAL PAIN: 1
EYE DISCHARGE: 0
CONSTIPATION: 0
PHOTOPHOBIA: 0

## 2019-10-07 ASSESSMENT — PAIN SCALES - GENERAL
PAINLEVEL_OUTOF10: 5
PAINLEVEL_OUTOF10: 6
PAINLEVEL_OUTOF10: 4
PAINLEVEL_OUTOF10: 0
PAINLEVEL_OUTOF10: 6
PAINLEVEL_OUTOF10: 5
PAINLEVEL_OUTOF10: 4
PAINLEVEL_OUTOF10: 5
PAINLEVEL_OUTOF10: 10
PAINLEVEL_OUTOF10: 5
PAINLEVEL_OUTOF10: 6
PAINLEVEL_OUTOF10: 7
PAINLEVEL_OUTOF10: 5
PAINLEVEL_OUTOF10: 7
PAINLEVEL_OUTOF10: 8
PAINLEVEL_OUTOF10: 10

## 2019-10-07 ASSESSMENT — PAIN DESCRIPTION - PROGRESSION
CLINICAL_PROGRESSION: GRADUALLY IMPROVING
CLINICAL_PROGRESSION: GRADUALLY WORSENING
CLINICAL_PROGRESSION: GRADUALLY WORSENING
CLINICAL_PROGRESSION: GRADUALLY IMPROVING

## 2019-10-07 ASSESSMENT — PAIN DESCRIPTION - FREQUENCY
FREQUENCY: INTERMITTENT

## 2019-10-07 ASSESSMENT — PAIN DESCRIPTION - LOCATION
LOCATION: ABDOMEN;BACK

## 2019-10-07 ASSESSMENT — PAIN DESCRIPTION - PAIN TYPE
TYPE: ACUTE PAIN

## 2019-10-07 ASSESSMENT — PAIN DESCRIPTION - DIRECTION: RADIATING_TOWARDS: BACK

## 2019-10-07 ASSESSMENT — PAIN DESCRIPTION - ONSET: ONSET: GRADUAL

## 2019-10-08 LAB
ALBUMIN SERPL-MCNC: 2.6 G/DL (ref 3.5–5.2)
ALP BLD-CCNC: 73 U/L (ref 40–129)
ALT SERPL-CCNC: 36 U/L (ref 0–40)
AMYLASE: 36 U/L (ref 20–100)
ANION GAP SERPL CALCULATED.3IONS-SCNC: 12 MMOL/L (ref 7–16)
AST SERPL-CCNC: 33 U/L (ref 0–39)
BASOPHILS ABSOLUTE: 0.04 E9/L (ref 0–0.2)
BASOPHILS RELATIVE PERCENT: 0.2 % (ref 0–2)
BILIRUB SERPL-MCNC: 0.3 MG/DL (ref 0–1.2)
BUN BLDV-MCNC: 7 MG/DL (ref 6–20)
C-REACTIVE PROTEIN: 30.6 MG/DL (ref 0–0.4)
CALCIUM SERPL-MCNC: 8.1 MG/DL (ref 8.6–10.2)
CHLORIDE BLD-SCNC: 93 MMOL/L (ref 98–107)
CO2: 31 MMOL/L (ref 22–29)
CREAT SERPL-MCNC: 0.9 MG/DL (ref 0.7–1.2)
DOHLE BODIES: ABNORMAL
EOSINOPHILS ABSOLUTE: 0.05 E9/L (ref 0.05–0.5)
EOSINOPHILS RELATIVE PERCENT: 0.3 % (ref 0–6)
GFR AFRICAN AMERICAN: >60
GFR NON-AFRICAN AMERICAN: >60 ML/MIN/1.73
GLUCOSE BLD-MCNC: 127 MG/DL (ref 74–99)
HCT VFR BLD CALC: 31.7 % (ref 37–54)
HEMOGLOBIN: 10.3 G/DL (ref 12.5–16.5)
HYPOCHROMIA: ABNORMAL
IMMATURE GRANULOCYTES #: 0.26 E9/L
IMMATURE GRANULOCYTES %: 1.5 % (ref 0–5)
LIPASE: 116 U/L (ref 13–60)
LYMPHOCYTES ABSOLUTE: 0.73 E9/L (ref 1.5–4)
LYMPHOCYTES RELATIVE PERCENT: 4.2 % (ref 20–42)
MAGNESIUM: 2.1 MG/DL (ref 1.6–2.6)
MCH RBC QN AUTO: 27.8 PG (ref 26–35)
MCHC RBC AUTO-ENTMCNC: 32.5 % (ref 32–34.5)
MCV RBC AUTO: 85.7 FL (ref 80–99.9)
MONOCYTES ABSOLUTE: 1.53 E9/L (ref 0.1–0.95)
MONOCYTES RELATIVE PERCENT: 8.9 % (ref 2–12)
NEUTROPHILS ABSOLUTE: 14.62 E9/L (ref 1.8–7.3)
NEUTROPHILS RELATIVE PERCENT: 84.9 % (ref 43–80)
PDW BLD-RTO: 18.4 FL (ref 11.5–15)
PLATELET # BLD: 384 E9/L (ref 130–450)
PMV BLD AUTO: 10.3 FL (ref 7–12)
POTASSIUM REFLEX MAGNESIUM: 3 MMOL/L (ref 3.5–5)
RBC # BLD: 3.7 E12/L (ref 3.8–5.8)
SODIUM BLD-SCNC: 136 MMOL/L (ref 132–146)
TOTAL PROTEIN: 5.7 G/DL (ref 6.4–8.3)
WBC # BLD: 17.2 E9/L (ref 4.5–11.5)

## 2019-10-08 PROCEDURE — 6370000000 HC RX 637 (ALT 250 FOR IP): Performed by: TRANSPLANT SURGERY

## 2019-10-08 PROCEDURE — 86140 C-REACTIVE PROTEIN: CPT

## 2019-10-08 PROCEDURE — 2580000003 HC RX 258: Performed by: INTERNAL MEDICINE

## 2019-10-08 PROCEDURE — 6370000000 HC RX 637 (ALT 250 FOR IP): Performed by: EMERGENCY MEDICINE

## 2019-10-08 PROCEDURE — 6370000000 HC RX 637 (ALT 250 FOR IP): Performed by: INTERNAL MEDICINE

## 2019-10-08 PROCEDURE — 83735 ASSAY OF MAGNESIUM: CPT

## 2019-10-08 PROCEDURE — 6360000002 HC RX W HCPCS: Performed by: INTERNAL MEDICINE

## 2019-10-08 PROCEDURE — 80053 COMPREHEN METABOLIC PANEL: CPT

## 2019-10-08 PROCEDURE — 94640 AIRWAY INHALATION TREATMENT: CPT

## 2019-10-08 PROCEDURE — 1200000000 HC SEMI PRIVATE

## 2019-10-08 PROCEDURE — 85025 COMPLETE CBC W/AUTO DIFF WBC: CPT

## 2019-10-08 PROCEDURE — 36415 COLL VENOUS BLD VENIPUNCTURE: CPT

## 2019-10-08 PROCEDURE — 6360000002 HC RX W HCPCS: Performed by: TRANSPLANT SURGERY

## 2019-10-08 PROCEDURE — 82150 ASSAY OF AMYLASE: CPT

## 2019-10-08 PROCEDURE — 99232 SBSQ HOSP IP/OBS MODERATE 35: CPT | Performed by: TRANSPLANT SURGERY

## 2019-10-08 PROCEDURE — 6360000002 HC RX W HCPCS: Performed by: EMERGENCY MEDICINE

## 2019-10-08 PROCEDURE — C9113 INJ PANTOPRAZOLE SODIUM, VIA: HCPCS | Performed by: INTERNAL MEDICINE

## 2019-10-08 PROCEDURE — 6370000000 HC RX 637 (ALT 250 FOR IP): Performed by: NURSE PRACTITIONER

## 2019-10-08 PROCEDURE — 83690 ASSAY OF LIPASE: CPT

## 2019-10-08 RX ORDER — FUROSEMIDE 10 MG/ML
20 INJECTION INTRAMUSCULAR; INTRAVENOUS ONCE
Status: COMPLETED | OUTPATIENT
Start: 2019-10-08 | End: 2019-10-08

## 2019-10-08 RX ORDER — POTASSIUM CHLORIDE 20 MEQ/1
40 TABLET, EXTENDED RELEASE ORAL ONCE
Status: COMPLETED | OUTPATIENT
Start: 2019-10-08 | End: 2019-10-08

## 2019-10-08 RX ORDER — PANTOPRAZOLE SODIUM 40 MG/1
40 TABLET, DELAYED RELEASE ORAL
Status: DISCONTINUED | OUTPATIENT
Start: 2019-10-09 | End: 2019-10-09 | Stop reason: HOSPADM

## 2019-10-08 RX ORDER — ACETAMINOPHEN 500 MG
500 TABLET ORAL EVERY 4 HOURS PRN
Status: DISCONTINUED | OUTPATIENT
Start: 2019-10-08 | End: 2019-10-09 | Stop reason: HOSPADM

## 2019-10-08 RX ADMIN — Medication 10 ML: at 07:52

## 2019-10-08 RX ADMIN — MEROPENEM 1 G: 1 INJECTION, POWDER, FOR SOLUTION INTRAVENOUS at 07:52

## 2019-10-08 RX ADMIN — POTASSIUM CHLORIDE 40 MEQ: 20 TABLET, EXTENDED RELEASE ORAL at 09:40

## 2019-10-08 RX ADMIN — ACETAMINOPHEN 500 MG: 500 TABLET ORAL at 00:28

## 2019-10-08 RX ADMIN — MEROPENEM 1 G: 1 INJECTION, POWDER, FOR SOLUTION INTRAVENOUS at 00:29

## 2019-10-08 RX ADMIN — IPRATROPIUM BROMIDE AND ALBUTEROL SULFATE 1 AMPULE: .5; 3 SOLUTION RESPIRATORY (INHALATION) at 12:14

## 2019-10-08 RX ADMIN — OXYCODONE HYDROCHLORIDE 5 MG: 5 TABLET ORAL at 07:59

## 2019-10-08 RX ADMIN — OXYCODONE HYDROCHLORIDE 5 MG: 5 TABLET ORAL at 19:33

## 2019-10-08 RX ADMIN — Medication 10 ML: at 07:53

## 2019-10-08 RX ADMIN — FUROSEMIDE 20 MG: 10 INJECTION, SOLUTION INTRAVENOUS at 09:40

## 2019-10-08 RX ADMIN — PANTOPRAZOLE SODIUM 40 MG: 40 INJECTION, POWDER, FOR SOLUTION INTRAVENOUS at 07:52

## 2019-10-08 RX ADMIN — PANCRELIPASE 3 CAPSULE: 60000; 12000; 38000 CAPSULE, DELAYED RELEASE PELLETS ORAL at 07:52

## 2019-10-08 RX ADMIN — MEROPENEM 1 G: 1 INJECTION, POWDER, FOR SOLUTION INTRAVENOUS at 15:32

## 2019-10-08 RX ADMIN — PANCRELIPASE 3 CAPSULE: 60000; 12000; 38000 CAPSULE, DELAYED RELEASE PELLETS ORAL at 15:33

## 2019-10-08 RX ADMIN — KETOROLAC TROMETHAMINE 15 MG: 30 INJECTION, SOLUTION INTRAMUSCULAR; INTRAVENOUS at 18:39

## 2019-10-08 RX ADMIN — ACETAMINOPHEN 500 MG: 500 TABLET ORAL at 17:30

## 2019-10-08 RX ADMIN — ACETAMINOPHEN 500 MG: 500 TABLET ORAL at 21:55

## 2019-10-08 RX ADMIN — KETOROLAC TROMETHAMINE 15 MG: 30 INJECTION, SOLUTION INTRAMUSCULAR; INTRAVENOUS at 12:31

## 2019-10-08 RX ADMIN — IPRATROPIUM BROMIDE AND ALBUTEROL SULFATE 1 AMPULE: .5; 3 SOLUTION RESPIRATORY (INHALATION) at 16:02

## 2019-10-08 RX ADMIN — KETOROLAC TROMETHAMINE 15 MG: 30 INJECTION, SOLUTION INTRAMUSCULAR; INTRAVENOUS at 00:29

## 2019-10-08 RX ADMIN — OXYCODONE HYDROCHLORIDE 5 MG: 5 TABLET ORAL at 02:18

## 2019-10-08 RX ADMIN — ACETAMINOPHEN 500 MG: 500 TABLET ORAL at 06:24

## 2019-10-08 RX ADMIN — ACETAMINOPHEN 500 MG: 500 TABLET ORAL at 11:05

## 2019-10-08 RX ADMIN — MEROPENEM 1 G: 1 INJECTION, POWDER, FOR SOLUTION INTRAVENOUS at 23:22

## 2019-10-08 RX ADMIN — IPRATROPIUM BROMIDE AND ALBUTEROL SULFATE 1 AMPULE: .5; 3 SOLUTION RESPIRATORY (INHALATION) at 08:48

## 2019-10-08 RX ADMIN — KETOROLAC TROMETHAMINE 15 MG: 30 INJECTION, SOLUTION INTRAMUSCULAR; INTRAVENOUS at 06:25

## 2019-10-08 RX ADMIN — ENOXAPARIN SODIUM 40 MG: 40 INJECTION SUBCUTANEOUS at 07:51

## 2019-10-08 RX ADMIN — PANCRELIPASE 3 CAPSULE: 60000; 12000; 38000 CAPSULE, DELAYED RELEASE PELLETS ORAL at 11:05

## 2019-10-08 RX ADMIN — OXYCODONE HYDROCHLORIDE 5 MG: 5 TABLET ORAL at 15:33

## 2019-10-08 RX ADMIN — Medication 10 ML: at 20:11

## 2019-10-08 RX ADMIN — Medication 10 ML: at 23:22

## 2019-10-08 RX ADMIN — OXYCODONE HYDROCHLORIDE 5 MG: 5 TABLET ORAL at 23:22

## 2019-10-08 RX ADMIN — IPRATROPIUM BROMIDE AND ALBUTEROL SULFATE 1 AMPULE: .5; 3 SOLUTION RESPIRATORY (INHALATION) at 20:25

## 2019-10-08 ASSESSMENT — PAIN SCALES - GENERAL
PAINLEVEL_OUTOF10: 5
PAINLEVEL_OUTOF10: 6
PAINLEVEL_OUTOF10: 2
PAINLEVEL_OUTOF10: 4
PAINLEVEL_OUTOF10: 5
PAINLEVEL_OUTOF10: 3
PAINLEVEL_OUTOF10: 4
PAINLEVEL_OUTOF10: 5
PAINLEVEL_OUTOF10: 2
PAINLEVEL_OUTOF10: 6
PAINLEVEL_OUTOF10: 6
PAINLEVEL_OUTOF10: 3
PAINLEVEL_OUTOF10: 4
PAINLEVEL_OUTOF10: 4

## 2019-10-08 ASSESSMENT — PAIN - FUNCTIONAL ASSESSMENT: PAIN_FUNCTIONAL_ASSESSMENT: ACTIVITIES ARE NOT PREVENTED

## 2019-10-08 ASSESSMENT — PAIN DESCRIPTION - PROGRESSION: CLINICAL_PROGRESSION: NOT CHANGED

## 2019-10-08 ASSESSMENT — PAIN DESCRIPTION - LOCATION: LOCATION: ABDOMEN

## 2019-10-08 ASSESSMENT — PAIN DESCRIPTION - DESCRIPTORS: DESCRIPTORS: CRAMPING

## 2019-10-08 ASSESSMENT — PAIN DESCRIPTION - ORIENTATION: ORIENTATION: LEFT

## 2019-10-08 ASSESSMENT — PAIN DESCRIPTION - PAIN TYPE: TYPE: ACUTE PAIN

## 2019-10-08 ASSESSMENT — PAIN DESCRIPTION - ONSET: ONSET: ON-GOING

## 2019-10-08 ASSESSMENT — PAIN DESCRIPTION - FREQUENCY: FREQUENCY: CONTINUOUS

## 2019-10-09 VITALS
BODY MASS INDEX: 34.16 KG/M2 | DIASTOLIC BLOOD PRESSURE: 78 MMHG | HEIGHT: 71 IN | RESPIRATION RATE: 16 BRPM | HEART RATE: 94 BPM | WEIGHT: 244 LBS | OXYGEN SATURATION: 95 % | SYSTOLIC BLOOD PRESSURE: 150 MMHG | TEMPERATURE: 98.4 F

## 2019-10-09 LAB
ALBUMIN SERPL-MCNC: 2.9 G/DL (ref 3.5–5.2)
ALP BLD-CCNC: 87 U/L (ref 40–129)
ALT SERPL-CCNC: 39 U/L (ref 0–40)
AMYLASE: 36 U/L (ref 20–100)
ANION GAP SERPL CALCULATED.3IONS-SCNC: 12 MMOL/L (ref 7–16)
AST SERPL-CCNC: 43 U/L (ref 0–39)
BASOPHILS ABSOLUTE: 0.03 E9/L (ref 0–0.2)
BASOPHILS RELATIVE PERCENT: 0.2 % (ref 0–2)
BILIRUB SERPL-MCNC: 0.4 MG/DL (ref 0–1.2)
BUN BLDV-MCNC: 6 MG/DL (ref 6–20)
C-REACTIVE PROTEIN: 28.3 MG/DL (ref 0–0.4)
CALCIUM SERPL-MCNC: 8.3 MG/DL (ref 8.6–10.2)
CHLORIDE BLD-SCNC: 96 MMOL/L (ref 98–107)
CO2: 31 MMOL/L (ref 22–29)
CREAT SERPL-MCNC: 0.8 MG/DL (ref 0.7–1.2)
EOSINOPHILS ABSOLUTE: 0.11 E9/L (ref 0.05–0.5)
EOSINOPHILS RELATIVE PERCENT: 0.7 % (ref 0–6)
GFR AFRICAN AMERICAN: >60
GFR NON-AFRICAN AMERICAN: >60 ML/MIN/1.73
GLUCOSE BLD-MCNC: 127 MG/DL (ref 74–99)
HCT VFR BLD CALC: 33.5 % (ref 37–54)
HEMOGLOBIN: 10.6 G/DL (ref 12.5–16.5)
IMMATURE GRANULOCYTES #: 0.19 E9/L
IMMATURE GRANULOCYTES %: 1.2 % (ref 0–5)
LIPASE: 103 U/L (ref 13–60)
LYMPHOCYTES ABSOLUTE: 0.7 E9/L (ref 1.5–4)
LYMPHOCYTES RELATIVE PERCENT: 4.4 % (ref 20–42)
MAGNESIUM: 2.1 MG/DL (ref 1.6–2.6)
MCH RBC QN AUTO: 27.4 PG (ref 26–35)
MCHC RBC AUTO-ENTMCNC: 31.6 % (ref 32–34.5)
MCV RBC AUTO: 86.6 FL (ref 80–99.9)
MONOCYTES ABSOLUTE: 1.03 E9/L (ref 0.1–0.95)
MONOCYTES RELATIVE PERCENT: 6.5 % (ref 2–12)
NEUTROPHILS ABSOLUTE: 13.84 E9/L (ref 1.8–7.3)
NEUTROPHILS RELATIVE PERCENT: 87 % (ref 43–80)
PDW BLD-RTO: 18.7 FL (ref 11.5–15)
PLATELET # BLD: 450 E9/L (ref 130–450)
PMV BLD AUTO: 10.3 FL (ref 7–12)
POTASSIUM REFLEX MAGNESIUM: 3.5 MMOL/L (ref 3.5–5)
RBC # BLD: 3.87 E12/L (ref 3.8–5.8)
SODIUM BLD-SCNC: 139 MMOL/L (ref 132–146)
TOTAL PROTEIN: 6.2 G/DL (ref 6.4–8.3)
WBC # BLD: 15.9 E9/L (ref 4.5–11.5)

## 2019-10-09 PROCEDURE — 36569 INSJ PICC 5 YR+ W/O IMAGING: CPT

## 2019-10-09 PROCEDURE — 85025 COMPLETE CBC W/AUTO DIFF WBC: CPT

## 2019-10-09 PROCEDURE — 94640 AIRWAY INHALATION TREATMENT: CPT

## 2019-10-09 PROCEDURE — 76937 US GUIDE VASCULAR ACCESS: CPT

## 2019-10-09 PROCEDURE — 6370000000 HC RX 637 (ALT 250 FOR IP): Performed by: INTERNAL MEDICINE

## 2019-10-09 PROCEDURE — 82150 ASSAY OF AMYLASE: CPT

## 2019-10-09 PROCEDURE — 80053 COMPREHEN METABOLIC PANEL: CPT

## 2019-10-09 PROCEDURE — 6370000000 HC RX 637 (ALT 250 FOR IP): Performed by: EMERGENCY MEDICINE

## 2019-10-09 PROCEDURE — 99232 SBSQ HOSP IP/OBS MODERATE 35: CPT | Performed by: TRANSPLANT SURGERY

## 2019-10-09 PROCEDURE — 6370000000 HC RX 637 (ALT 250 FOR IP): Performed by: TRANSPLANT SURGERY

## 2019-10-09 PROCEDURE — 02HV33Z INSERTION OF INFUSION DEVICE INTO SUPERIOR VENA CAVA, PERCUTANEOUS APPROACH: ICD-10-PCS | Performed by: INTERNAL MEDICINE

## 2019-10-09 PROCEDURE — 86140 C-REACTIVE PROTEIN: CPT

## 2019-10-09 PROCEDURE — 83735 ASSAY OF MAGNESIUM: CPT

## 2019-10-09 PROCEDURE — 6360000002 HC RX W HCPCS: Performed by: INTERNAL MEDICINE

## 2019-10-09 PROCEDURE — 2580000003 HC RX 258: Performed by: INTERNAL MEDICINE

## 2019-10-09 PROCEDURE — C1751 CATH, INF, PER/CENT/MIDLINE: HCPCS

## 2019-10-09 PROCEDURE — 36415 COLL VENOUS BLD VENIPUNCTURE: CPT

## 2019-10-09 PROCEDURE — 6370000000 HC RX 637 (ALT 250 FOR IP): Performed by: NURSE PRACTITIONER

## 2019-10-09 PROCEDURE — 83690 ASSAY OF LIPASE: CPT

## 2019-10-09 PROCEDURE — 2500000003 HC RX 250 WO HCPCS: Performed by: INTERNAL MEDICINE

## 2019-10-09 PROCEDURE — 6360000002 HC RX W HCPCS: Performed by: TRANSPLANT SURGERY

## 2019-10-09 RX ORDER — SIMETHICONE 20 MG/.3ML
40 EMULSION ORAL 4 TIMES DAILY
Qty: 60 ML | Refills: 3 | Status: SHIPPED | OUTPATIENT
Start: 2019-10-09 | End: 2019-11-11

## 2019-10-09 RX ORDER — SODIUM CHLORIDE 0.9 % (FLUSH) 0.9 %
10 SYRINGE (ML) INJECTION EVERY 12 HOURS SCHEDULED
Status: DISCONTINUED | OUTPATIENT
Start: 2019-10-09 | End: 2019-10-09 | Stop reason: HOSPADM

## 2019-10-09 RX ORDER — IBUPROFEN 800 MG/1
800 TABLET ORAL
Qty: 42 TABLET | Refills: 0 | Status: ON HOLD | OUTPATIENT
Start: 2019-10-09 | End: 2019-11-14 | Stop reason: HOSPADM

## 2019-10-09 RX ORDER — LIDOCAINE HYDROCHLORIDE 10 MG/ML
5 INJECTION, SOLUTION EPIDURAL; INFILTRATION; INTRACAUDAL; PERINEURAL ONCE
Status: COMPLETED | OUTPATIENT
Start: 2019-10-09 | End: 2019-10-09

## 2019-10-09 RX ORDER — OXYCODONE HYDROCHLORIDE 5 MG/1
5 TABLET ORAL EVERY 4 HOURS PRN
Qty: 42 TABLET | Refills: 0 | Status: SHIPPED | OUTPATIENT
Start: 2019-10-09 | End: 2019-10-16

## 2019-10-09 RX ORDER — SODIUM CHLORIDE 0.9 % (FLUSH) 0.9 %
10 SYRINGE (ML) INJECTION PRN
Status: DISCONTINUED | OUTPATIENT
Start: 2019-10-09 | End: 2019-10-09 | Stop reason: HOSPADM

## 2019-10-09 RX ADMIN — KETOROLAC TROMETHAMINE 15 MG: 30 INJECTION, SOLUTION INTRAMUSCULAR; INTRAVENOUS at 13:07

## 2019-10-09 RX ADMIN — PANTOPRAZOLE SODIUM 40 MG: 40 TABLET, DELAYED RELEASE ORAL at 06:58

## 2019-10-09 RX ADMIN — OXYCODONE HYDROCHLORIDE 5 MG: 5 TABLET ORAL at 16:07

## 2019-10-09 RX ADMIN — LIDOCAINE HYDROCHLORIDE 5 ML: 10 INJECTION, SOLUTION EPIDURAL; INFILTRATION; INTRACAUDAL; PERINEURAL at 15:00

## 2019-10-09 RX ADMIN — PANCRELIPASE 3 CAPSULE: 60000; 12000; 38000 CAPSULE, DELAYED RELEASE PELLETS ORAL at 07:05

## 2019-10-09 RX ADMIN — MEROPENEM 1 G: 1 INJECTION, POWDER, FOR SOLUTION INTRAVENOUS at 09:34

## 2019-10-09 RX ADMIN — PANCRELIPASE 3 CAPSULE: 60000; 12000; 38000 CAPSULE, DELAYED RELEASE PELLETS ORAL at 11:04

## 2019-10-09 RX ADMIN — MEROPENEM 1 G: 1 INJECTION, POWDER, FOR SOLUTION INTRAVENOUS at 14:45

## 2019-10-09 RX ADMIN — PANCRELIPASE 3 CAPSULE: 60000; 12000; 38000 CAPSULE, DELAYED RELEASE PELLETS ORAL at 16:07

## 2019-10-09 RX ADMIN — ACETAMINOPHEN 500 MG: 500 TABLET ORAL at 14:42

## 2019-10-09 RX ADMIN — OXYCODONE HYDROCHLORIDE 5 MG: 5 TABLET ORAL at 05:29

## 2019-10-09 RX ADMIN — ACETAMINOPHEN 500 MG: 500 TABLET ORAL at 09:34

## 2019-10-09 RX ADMIN — OXYCODONE HYDROCHLORIDE 5 MG: 5 TABLET ORAL at 11:04

## 2019-10-09 RX ADMIN — IPRATROPIUM BROMIDE AND ALBUTEROL SULFATE 1 AMPULE: .5; 3 SOLUTION RESPIRATORY (INHALATION) at 09:37

## 2019-10-09 RX ADMIN — IPRATROPIUM BROMIDE AND ALBUTEROL SULFATE 1 AMPULE: .5; 3 SOLUTION RESPIRATORY (INHALATION) at 12:05

## 2019-10-09 RX ADMIN — Medication 10 ML: at 09:36

## 2019-10-09 RX ADMIN — KETOROLAC TROMETHAMINE 15 MG: 30 INJECTION, SOLUTION INTRAMUSCULAR; INTRAVENOUS at 06:58

## 2019-10-09 RX ADMIN — ACETAMINOPHEN 500 MG: 500 TABLET ORAL at 03:55

## 2019-10-09 RX ADMIN — KETOROLAC TROMETHAMINE 15 MG: 30 INJECTION, SOLUTION INTRAMUSCULAR; INTRAVENOUS at 01:38

## 2019-10-09 ASSESSMENT — PAIN DESCRIPTION - ORIENTATION: ORIENTATION: LEFT

## 2019-10-09 ASSESSMENT — PAIN DESCRIPTION - ONSET: ONSET: ON-GOING

## 2019-10-09 ASSESSMENT — PAIN SCALES - GENERAL
PAINLEVEL_OUTOF10: 0
PAINLEVEL_OUTOF10: 5
PAINLEVEL_OUTOF10: 4
PAINLEVEL_OUTOF10: 4
PAINLEVEL_OUTOF10: 0
PAINLEVEL_OUTOF10: 5
PAINLEVEL_OUTOF10: 4
PAINLEVEL_OUTOF10: 0
PAINLEVEL_OUTOF10: 6

## 2019-10-09 ASSESSMENT — PAIN DESCRIPTION - PAIN TYPE: TYPE: ACUTE PAIN

## 2019-10-09 ASSESSMENT — PAIN DESCRIPTION - DESCRIPTORS: DESCRIPTORS: ACHING

## 2019-10-09 ASSESSMENT — PAIN DESCRIPTION - LOCATION: LOCATION: ABDOMEN

## 2019-10-09 ASSESSMENT — PAIN - FUNCTIONAL ASSESSMENT: PAIN_FUNCTIONAL_ASSESSMENT: ACTIVITIES ARE NOT PREVENTED

## 2019-10-09 ASSESSMENT — PAIN DESCRIPTION - FREQUENCY: FREQUENCY: CONTINUOUS

## 2019-10-10 ENCOUNTER — HOSPITAL ENCOUNTER (OUTPATIENT)
Age: 39
Discharge: HOME | End: 2019-10-12
Payer: COMMERCIAL

## 2019-10-10 LAB
ALBUMIN SERPL-MCNC: 2.7 G/DL (ref 3.5–5.2)
ALP BLD-CCNC: 85 U/L (ref 40–129)
ALT SERPL-CCNC: 38 U/L (ref 0–40)
ANION GAP SERPL CALCULATED.3IONS-SCNC: 23 MMOL/L (ref 7–16)
AST SERPL-CCNC: 52 U/L (ref 0–39)
BASOPHILS ABSOLUTE: 0.05 E9/L (ref 0–0.2)
BASOPHILS RELATIVE PERCENT: 0.3 % (ref 0–2)
BILIRUB SERPL-MCNC: 0.4 MG/DL (ref 0–1.2)
BUN BLDV-MCNC: 7 MG/DL (ref 6–20)
C-REACTIVE PROTEIN: 26.2 MG/DL (ref 0–0.4)
CALCIUM SERPL-MCNC: 8.5 MG/DL (ref 8.6–10.2)
CHLORIDE BLD-SCNC: 99 MMOL/L (ref 98–107)
CO2: 21 MMOL/L (ref 22–29)
CREAT SERPL-MCNC: 0.8 MG/DL (ref 0.7–1.2)
EOSINOPHILS ABSOLUTE: 0.09 E9/L (ref 0.05–0.5)
EOSINOPHILS RELATIVE PERCENT: 0.6 % (ref 0–6)
GFR AFRICAN AMERICAN: >60
GFR NON-AFRICAN AMERICAN: >60 ML/MIN/1.73
GLUCOSE BLD-MCNC: 100 MG/DL (ref 74–99)
HCT VFR BLD CALC: 34 % (ref 37–54)
HEMOGLOBIN: 10.6 G/DL (ref 12.5–16.5)
IMMATURE GRANULOCYTES #: 0.13 E9/L
IMMATURE GRANULOCYTES %: 0.8 % (ref 0–5)
LYMPHOCYTES ABSOLUTE: 0.89 E9/L (ref 1.5–4)
LYMPHOCYTES RELATIVE PERCENT: 5.6 % (ref 20–42)
MCH RBC QN AUTO: 27.9 PG (ref 26–35)
MCHC RBC AUTO-ENTMCNC: 31.2 % (ref 32–34.5)
MCV RBC AUTO: 89.5 FL (ref 80–99.9)
MONOCYTES ABSOLUTE: 1.26 E9/L (ref 0.1–0.95)
MONOCYTES RELATIVE PERCENT: 7.9 % (ref 2–12)
NEUTROPHILS ABSOLUTE: 13.57 E9/L (ref 1.8–7.3)
NEUTROPHILS RELATIVE PERCENT: 84.8 % (ref 43–80)
PDW BLD-RTO: 19.1 FL (ref 11.5–15)
PLATELET # BLD: 490 E9/L (ref 130–450)
PMV BLD AUTO: 10.5 FL (ref 7–12)
POTASSIUM SERPL-SCNC: 3.7 MMOL/L (ref 3.5–5)
RBC # BLD: 3.8 E12/L (ref 3.8–5.8)
SEDIMENTATION RATE, ERYTHROCYTE: 43 MM/HR (ref 0–15)
SODIUM BLD-SCNC: 143 MMOL/L (ref 132–146)
TOTAL PROTEIN: 6.5 G/DL (ref 6.4–8.3)
WBC # BLD: 16 E9/L (ref 4.5–11.5)

## 2019-10-10 PROCEDURE — 80053 COMPREHEN METABOLIC PANEL: CPT

## 2019-10-10 PROCEDURE — 85651 RBC SED RATE NONAUTOMATED: CPT

## 2019-10-10 PROCEDURE — 85025 COMPLETE CBC W/AUTO DIFF WBC: CPT

## 2019-10-10 PROCEDURE — 86140 C-REACTIVE PROTEIN: CPT

## 2019-10-14 ENCOUNTER — HOSPITAL ENCOUNTER (OUTPATIENT)
Age: 39
Discharge: HOME | End: 2019-10-16
Payer: COMMERCIAL

## 2019-10-14 PROCEDURE — 86140 C-REACTIVE PROTEIN: CPT

## 2019-10-14 PROCEDURE — 85025 COMPLETE CBC W/AUTO DIFF WBC: CPT

## 2019-10-14 PROCEDURE — 85651 RBC SED RATE NONAUTOMATED: CPT

## 2019-10-14 PROCEDURE — 80053 COMPREHEN METABOLIC PANEL: CPT

## 2019-10-15 LAB
ALBUMIN SERPL-MCNC: 3.2 G/DL (ref 3.5–5.2)
ALP BLD-CCNC: 65 U/L (ref 40–129)
ALT SERPL-CCNC: 31 U/L (ref 0–40)
ANION GAP SERPL CALCULATED.3IONS-SCNC: 14 MMOL/L (ref 7–16)
AST SERPL-CCNC: 32 U/L (ref 0–39)
BASOPHILS ABSOLUTE: 0 E9/L (ref 0–0.2)
BASOPHILS RELATIVE PERCENT: 0.6 % (ref 0–2)
BILIRUB SERPL-MCNC: 0.2 MG/DL (ref 0–1.2)
BUN BLDV-MCNC: 6 MG/DL (ref 6–20)
BURR CELLS: ABNORMAL
C-REACTIVE PROTEIN: 5.1 MG/DL (ref 0–0.4)
CALCIUM SERPL-MCNC: 8.8 MG/DL (ref 8.6–10.2)
CHLORIDE BLD-SCNC: 103 MMOL/L (ref 98–107)
CO2: 27 MMOL/L (ref 22–29)
CREAT SERPL-MCNC: 0.9 MG/DL (ref 0.7–1.2)
EOSINOPHILS ABSOLUTE: 0.19 E9/L (ref 0.05–0.5)
EOSINOPHILS RELATIVE PERCENT: 2.7 % (ref 0–6)
GFR AFRICAN AMERICAN: >60
GFR NON-AFRICAN AMERICAN: >60 ML/MIN/1.73
GLUCOSE BLD-MCNC: 117 MG/DL (ref 74–99)
HCT VFR BLD CALC: 39.1 % (ref 37–54)
HEMOGLOBIN: 11.3 G/DL (ref 12.5–16.5)
LYMPHOCYTES ABSOLUTE: 0.92 E9/L (ref 1.5–4)
LYMPHOCYTES RELATIVE PERCENT: 13.3 % (ref 20–42)
MCH RBC QN AUTO: 26.7 PG (ref 26–35)
MCHC RBC AUTO-ENTMCNC: 28.9 % (ref 32–34.5)
MCV RBC AUTO: 92.2 FL (ref 80–99.9)
MONOCYTES ABSOLUTE: 0.57 E9/L (ref 0.1–0.95)
MONOCYTES RELATIVE PERCENT: 8 % (ref 2–12)
NEUTROPHILS ABSOLUTE: 5.4 E9/L (ref 1.8–7.3)
NEUTROPHILS RELATIVE PERCENT: 76.1 % (ref 43–80)
NUCLEATED RED BLOOD CELLS: 0.9 /100 WBC
OVALOCYTES: ABNORMAL
PDW BLD-RTO: 18.5 FL (ref 11.5–15)
PLATELET # BLD: 600 E9/L (ref 130–450)
PMV BLD AUTO: 9.9 FL (ref 7–12)
POIKILOCYTES: ABNORMAL
POLYCHROMASIA: ABNORMAL
POTASSIUM SERPL-SCNC: 4.1 MMOL/L (ref 3.5–5)
RBC # BLD: 4.24 E12/L (ref 3.8–5.8)
SEDIMENTATION RATE, ERYTHROCYTE: 33 MM/HR (ref 0–15)
SODIUM BLD-SCNC: 144 MMOL/L (ref 132–146)
TOTAL PROTEIN: 6.8 G/DL (ref 6.4–8.3)
WBC # BLD: 7.1 E9/L (ref 4.5–11.5)

## 2019-10-16 ENCOUNTER — TELEPHONE (OUTPATIENT)
Dept: HEMATOLOGY | Age: 39
End: 2019-10-16

## 2019-10-17 ENCOUNTER — HOSPITAL ENCOUNTER (OUTPATIENT)
Age: 39
Discharge: HOME | End: 2019-10-19
Payer: COMMERCIAL

## 2019-10-17 ENCOUNTER — OFFICE VISIT (OUTPATIENT)
Dept: HEMATOLOGY | Age: 39
End: 2019-10-17
Payer: COMMERCIAL

## 2019-10-17 VITALS
BODY MASS INDEX: 29.96 KG/M2 | SYSTOLIC BLOOD PRESSURE: 134 MMHG | HEART RATE: 75 BPM | WEIGHT: 214 LBS | DIASTOLIC BLOOD PRESSURE: 85 MMHG | HEIGHT: 71 IN

## 2019-10-17 DIAGNOSIS — K85.81 OTHER ACUTE PANCREATITIS WITH UNINFECTED NECROSIS: Primary | ICD-10-CM

## 2019-10-17 LAB
ALBUMIN SERPL-MCNC: 3.5 G/DL (ref 3.5–5.2)
ALP BLD-CCNC: 60 U/L (ref 40–129)
ALT SERPL-CCNC: 27 U/L (ref 0–40)
ANION GAP SERPL CALCULATED.3IONS-SCNC: 20 MMOL/L (ref 7–16)
AST SERPL-CCNC: 31 U/L (ref 0–39)
BASOPHILS ABSOLUTE: 0.04 E9/L (ref 0–0.2)
BASOPHILS RELATIVE PERCENT: 0.5 % (ref 0–2)
BILIRUB SERPL-MCNC: 0.3 MG/DL (ref 0–1.2)
BUN BLDV-MCNC: 8 MG/DL (ref 6–20)
CALCIUM SERPL-MCNC: 9.1 MG/DL (ref 8.6–10.2)
CHLORIDE BLD-SCNC: 103 MMOL/L (ref 98–107)
CO2: 20 MMOL/L (ref 22–29)
CREAT SERPL-MCNC: 0.9 MG/DL (ref 0.7–1.2)
EOSINOPHILS ABSOLUTE: 0.14 E9/L (ref 0.05–0.5)
EOSINOPHILS RELATIVE PERCENT: 1.9 % (ref 0–6)
GFR AFRICAN AMERICAN: >60
GFR NON-AFRICAN AMERICAN: >60 ML/MIN/1.73
GLUCOSE BLD-MCNC: 122 MG/DL (ref 74–99)
HCT VFR BLD CALC: 35.3 % (ref 37–54)
HEMOGLOBIN: 10.6 G/DL (ref 12.5–16.5)
IMMATURE GRANULOCYTES #: 0.03 E9/L
IMMATURE GRANULOCYTES %: 0.4 % (ref 0–5)
LYMPHOCYTES ABSOLUTE: 0.95 E9/L (ref 1.5–4)
LYMPHOCYTES RELATIVE PERCENT: 13 % (ref 20–42)
MCH RBC QN AUTO: 26.8 PG (ref 26–35)
MCHC RBC AUTO-ENTMCNC: 30 % (ref 32–34.5)
MCV RBC AUTO: 89.1 FL (ref 80–99.9)
MONOCYTES ABSOLUTE: 0.64 E9/L (ref 0.1–0.95)
MONOCYTES RELATIVE PERCENT: 8.7 % (ref 2–12)
NEUTROPHILS ABSOLUTE: 5.52 E9/L (ref 1.8–7.3)
NEUTROPHILS RELATIVE PERCENT: 75.5 % (ref 43–80)
PDW BLD-RTO: 18.3 FL (ref 11.5–15)
PLATELET # BLD: 493 E9/L (ref 130–450)
PMV BLD AUTO: 10 FL (ref 7–12)
POTASSIUM SERPL-SCNC: 4 MMOL/L (ref 3.5–5)
RBC # BLD: 3.96 E12/L (ref 3.8–5.8)
SODIUM BLD-SCNC: 143 MMOL/L (ref 132–146)
TOTAL PROTEIN: 6.7 G/DL (ref 6.4–8.3)
WBC # BLD: 7.3 E9/L (ref 4.5–11.5)

## 2019-10-17 PROCEDURE — 85025 COMPLETE CBC W/AUTO DIFF WBC: CPT

## 2019-10-17 PROCEDURE — 80053 COMPREHEN METABOLIC PANEL: CPT

## 2019-10-17 PROCEDURE — 99213 OFFICE O/P EST LOW 20 MIN: CPT | Performed by: TRANSPLANT SURGERY

## 2019-10-18 ENCOUNTER — TELEPHONE (OUTPATIENT)
Dept: HEMATOLOGY | Age: 39
End: 2019-10-18

## 2019-10-21 ENCOUNTER — HOSPITAL ENCOUNTER (OUTPATIENT)
Age: 39
Discharge: HOME | End: 2019-10-23
Payer: COMMERCIAL

## 2019-10-21 LAB
ALBUMIN SERPL-MCNC: 3.6 G/DL (ref 3.5–5.2)
ALP BLD-CCNC: 66 U/L (ref 40–129)
ALT SERPL-CCNC: 20 U/L (ref 0–40)
ANION GAP SERPL CALCULATED.3IONS-SCNC: 12 MMOL/L (ref 7–16)
AST SERPL-CCNC: 28 U/L (ref 0–39)
BASOPHILS ABSOLUTE: 0.07 E9/L (ref 0–0.2)
BASOPHILS RELATIVE PERCENT: 0.9 % (ref 0–2)
BILIRUB SERPL-MCNC: 0.3 MG/DL (ref 0–1.2)
BUN BLDV-MCNC: 7 MG/DL (ref 6–20)
C-REACTIVE PROTEIN: 2.9 MG/DL (ref 0–0.4)
CALCIUM SERPL-MCNC: 9.4 MG/DL (ref 8.6–10.2)
CHLORIDE BLD-SCNC: 101 MMOL/L (ref 98–107)
CO2: 27 MMOL/L (ref 22–29)
CREAT SERPL-MCNC: 0.9 MG/DL (ref 0.7–1.2)
EOSINOPHILS ABSOLUTE: 0.28 E9/L (ref 0.05–0.5)
EOSINOPHILS RELATIVE PERCENT: 3.7 % (ref 0–6)
GFR AFRICAN AMERICAN: >60
GFR NON-AFRICAN AMERICAN: >60 ML/MIN/1.73
GLUCOSE BLD-MCNC: 89 MG/DL (ref 74–99)
HCT VFR BLD CALC: 40.5 % (ref 37–54)
HEMOGLOBIN: 11.9 G/DL (ref 12.5–16.5)
IMMATURE GRANULOCYTES #: 0.02 E9/L
IMMATURE GRANULOCYTES %: 0.3 % (ref 0–5)
LYMPHOCYTES ABSOLUTE: 1.08 E9/L (ref 1.5–4)
LYMPHOCYTES RELATIVE PERCENT: 14.4 % (ref 20–42)
MCH RBC QN AUTO: 26.6 PG (ref 26–35)
MCHC RBC AUTO-ENTMCNC: 29.4 % (ref 32–34.5)
MCV RBC AUTO: 90.6 FL (ref 80–99.9)
MONOCYTES ABSOLUTE: 0.71 E9/L (ref 0.1–0.95)
MONOCYTES RELATIVE PERCENT: 9.5 % (ref 2–12)
NEUTROPHILS ABSOLUTE: 5.34 E9/L (ref 1.8–7.3)
NEUTROPHILS RELATIVE PERCENT: 71.2 % (ref 43–80)
PDW BLD-RTO: 18 FL (ref 11.5–15)
PLATELET # BLD: 433 E9/L (ref 130–450)
PMV BLD AUTO: 10.4 FL (ref 7–12)
POTASSIUM SERPL-SCNC: 4.5 MMOL/L (ref 3.5–5)
RBC # BLD: 4.47 E12/L (ref 3.8–5.8)
SEDIMENTATION RATE, ERYTHROCYTE: 40 MM/HR (ref 0–15)
SODIUM BLD-SCNC: 140 MMOL/L (ref 132–146)
TOTAL PROTEIN: 7.1 G/DL (ref 6.4–8.3)
WBC # BLD: 7.5 E9/L (ref 4.5–11.5)

## 2019-10-21 PROCEDURE — 86140 C-REACTIVE PROTEIN: CPT

## 2019-10-21 PROCEDURE — 85651 RBC SED RATE NONAUTOMATED: CPT

## 2019-10-21 PROCEDURE — 80053 COMPREHEN METABOLIC PANEL: CPT

## 2019-10-21 PROCEDURE — 36415 COLL VENOUS BLD VENIPUNCTURE: CPT

## 2019-10-21 PROCEDURE — 85025 COMPLETE CBC W/AUTO DIFF WBC: CPT

## 2019-11-14 ENCOUNTER — ANESTHESIA EVENT (OUTPATIENT)
Dept: OPERATING ROOM | Age: 39
End: 2019-11-14
Payer: COMMERCIAL

## 2019-11-14 ENCOUNTER — ANESTHESIA (OUTPATIENT)
Dept: OPERATING ROOM | Age: 39
End: 2019-11-14
Payer: COMMERCIAL

## 2019-11-14 ENCOUNTER — HOSPITAL ENCOUNTER (OUTPATIENT)
Age: 39
Setting detail: OUTPATIENT SURGERY
Discharge: HOME | End: 2019-11-14
Attending: TRANSPLANT SURGERY | Admitting: TRANSPLANT SURGERY
Payer: COMMERCIAL

## 2019-11-14 VITALS
OXYGEN SATURATION: 97 % | HEIGHT: 71 IN | SYSTOLIC BLOOD PRESSURE: 131 MMHG | HEART RATE: 66 BPM | RESPIRATION RATE: 16 BRPM | BODY MASS INDEX: 28 KG/M2 | TEMPERATURE: 97.8 F | DIASTOLIC BLOOD PRESSURE: 76 MMHG | WEIGHT: 200 LBS

## 2019-11-14 VITALS
SYSTOLIC BLOOD PRESSURE: 126 MMHG | OXYGEN SATURATION: 97 % | RESPIRATION RATE: 1 BRPM | TEMPERATURE: 96.1 F | DIASTOLIC BLOOD PRESSURE: 63 MMHG

## 2019-11-14 DIAGNOSIS — Z90.49 S/P LAPAROSCOPIC CHOLECYSTECTOMY: ICD-10-CM

## 2019-11-14 DIAGNOSIS — K86.3 PANCREATIC PSEUDOCYST: ICD-10-CM

## 2019-11-14 DIAGNOSIS — K85.10 GALLSTONE PANCREATITIS: Primary | ICD-10-CM

## 2019-11-14 PROCEDURE — 6360000002 HC RX W HCPCS: Performed by: NURSE ANESTHETIST, CERTIFIED REGISTERED

## 2019-11-14 PROCEDURE — 47562 LAPAROSCOPIC CHOLECYSTECTOMY: CPT | Performed by: TRANSPLANT SURGERY

## 2019-11-14 PROCEDURE — 7100000011 HC PHASE II RECOVERY - ADDTL 15 MIN: Performed by: TRANSPLANT SURGERY

## 2019-11-14 PROCEDURE — 2709999900 HC NON-CHARGEABLE SUPPLY: Performed by: TRANSPLANT SURGERY

## 2019-11-14 PROCEDURE — 88304 TISSUE EXAM BY PATHOLOGIST: CPT

## 2019-11-14 PROCEDURE — 7100000010 HC PHASE II RECOVERY - FIRST 15 MIN: Performed by: TRANSPLANT SURGERY

## 2019-11-14 PROCEDURE — 2500000003 HC RX 250 WO HCPCS: Performed by: TRANSPLANT SURGERY

## 2019-11-14 PROCEDURE — 7100000001 HC PACU RECOVERY - ADDTL 15 MIN: Performed by: TRANSPLANT SURGERY

## 2019-11-14 PROCEDURE — 3600000019 HC SURGERY ROBOT ADDTL 15MIN: Performed by: TRANSPLANT SURGERY

## 2019-11-14 PROCEDURE — 2500000003 HC RX 250 WO HCPCS: Performed by: NURSE ANESTHETIST, CERTIFIED REGISTERED

## 2019-11-14 PROCEDURE — 7100000000 HC PACU RECOVERY - FIRST 15 MIN: Performed by: TRANSPLANT SURGERY

## 2019-11-14 PROCEDURE — 3700000001 HC ADD 15 MINUTES (ANESTHESIA): Performed by: TRANSPLANT SURGERY

## 2019-11-14 PROCEDURE — 3700000000 HC ANESTHESIA ATTENDED CARE: Performed by: TRANSPLANT SURGERY

## 2019-11-14 PROCEDURE — C1773 RET DEV, INSERTABLE: HCPCS | Performed by: TRANSPLANT SURGERY

## 2019-11-14 PROCEDURE — 6370000000 HC RX 637 (ALT 250 FOR IP): Performed by: ANESTHESIOLOGY

## 2019-11-14 PROCEDURE — 2500000003 HC RX 250 WO HCPCS: Performed by: ANESTHESIOLOGY

## 2019-11-14 PROCEDURE — 2580000003 HC RX 258: Performed by: NURSE ANESTHETIST, CERTIFIED REGISTERED

## 2019-11-14 PROCEDURE — 6360000002 HC RX W HCPCS: Performed by: TRANSPLANT SURGERY

## 2019-11-14 PROCEDURE — S2900 ROBOTIC SURGICAL SYSTEM: HCPCS | Performed by: TRANSPLANT SURGERY

## 2019-11-14 PROCEDURE — 6360000002 HC RX W HCPCS: Performed by: ANESTHESIOLOGY

## 2019-11-14 PROCEDURE — 3600000009 HC SURGERY ROBOT BASE: Performed by: TRANSPLANT SURGERY

## 2019-11-14 RX ORDER — MORPHINE SULFATE 2 MG/ML
1 INJECTION, SOLUTION INTRAMUSCULAR; INTRAVENOUS EVERY 5 MIN PRN
Status: DISCONTINUED | OUTPATIENT
Start: 2019-11-14 | End: 2019-11-14 | Stop reason: HOSPADM

## 2019-11-14 RX ORDER — SODIUM CHLORIDE 0.9 % (FLUSH) 0.9 %
10 SYRINGE (ML) INJECTION EVERY 12 HOURS SCHEDULED
Status: DISCONTINUED | OUTPATIENT
Start: 2019-11-14 | End: 2019-11-14 | Stop reason: HOSPADM

## 2019-11-14 RX ORDER — AMOXICILLIN 250 MG
2 CAPSULE ORAL DAILY
Qty: 28 TABLET | Refills: 0 | Status: SHIPPED | OUTPATIENT
Start: 2019-11-14 | End: 2019-11-28

## 2019-11-14 RX ORDER — DEXAMETHASONE SODIUM PHOSPHATE 4 MG/ML
INJECTION, SOLUTION INTRA-ARTICULAR; INTRALESIONAL; INTRAMUSCULAR; INTRAVENOUS; SOFT TISSUE PRN
Status: DISCONTINUED | OUTPATIENT
Start: 2019-11-14 | End: 2019-11-14 | Stop reason: SDUPTHER

## 2019-11-14 RX ORDER — OXYCODONE HYDROCHLORIDE 5 MG/1
5 TABLET ORAL EVERY 6 HOURS PRN
Qty: 28 TABLET | Refills: 0 | Status: SHIPPED | OUTPATIENT
Start: 2019-11-14 | End: 2019-11-21

## 2019-11-14 RX ORDER — BUPIVACAINE HYDROCHLORIDE 5 MG/ML
INJECTION, SOLUTION EPIDURAL; INTRACAUDAL PRN
Status: DISCONTINUED | OUTPATIENT
Start: 2019-11-14 | End: 2019-11-14 | Stop reason: ALTCHOICE

## 2019-11-14 RX ORDER — SODIUM CHLORIDE 0.9 % (FLUSH) 0.9 %
10 SYRINGE (ML) INJECTION PRN
Status: DISCONTINUED | OUTPATIENT
Start: 2019-11-14 | End: 2019-11-14 | Stop reason: HOSPADM

## 2019-11-14 RX ORDER — MIDAZOLAM HYDROCHLORIDE 1 MG/ML
INJECTION INTRAMUSCULAR; INTRAVENOUS PRN
Status: DISCONTINUED | OUTPATIENT
Start: 2019-11-14 | End: 2019-11-14 | Stop reason: SDUPTHER

## 2019-11-14 RX ORDER — ACETAMINOPHEN 500 MG
1000 TABLET ORAL EVERY 6 HOURS
Qty: 112 TABLET | Refills: 0 | OUTPATIENT
Start: 2019-11-14 | End: 2021-04-26

## 2019-11-14 RX ORDER — PROPOFOL 10 MG/ML
INJECTION, EMULSION INTRAVENOUS PRN
Status: DISCONTINUED | OUTPATIENT
Start: 2019-11-14 | End: 2019-11-14 | Stop reason: SDUPTHER

## 2019-11-14 RX ORDER — LABETALOL HYDROCHLORIDE 5 MG/ML
10 INJECTION, SOLUTION INTRAVENOUS EVERY 10 MIN PRN
Status: DISCONTINUED | OUTPATIENT
Start: 2019-11-14 | End: 2019-11-14 | Stop reason: HOSPADM

## 2019-11-14 RX ORDER — ONDANSETRON 2 MG/ML
INJECTION INTRAMUSCULAR; INTRAVENOUS PRN
Status: DISCONTINUED | OUTPATIENT
Start: 2019-11-14 | End: 2019-11-14 | Stop reason: SDUPTHER

## 2019-11-14 RX ORDER — KETOROLAC TROMETHAMINE 30 MG/ML
INJECTION, SOLUTION INTRAMUSCULAR; INTRAVENOUS PRN
Status: DISCONTINUED | OUTPATIENT
Start: 2019-11-14 | End: 2019-11-14 | Stop reason: SDUPTHER

## 2019-11-14 RX ORDER — SODIUM CHLORIDE, SODIUM LACTATE, POTASSIUM CHLORIDE, CALCIUM CHLORIDE 600; 310; 30; 20 MG/100ML; MG/100ML; MG/100ML; MG/100ML
INJECTION, SOLUTION INTRAVENOUS CONTINUOUS PRN
Status: DISCONTINUED | OUTPATIENT
Start: 2019-11-14 | End: 2019-11-14 | Stop reason: SDUPTHER

## 2019-11-14 RX ORDER — GLYCOPYRROLATE 1 MG/5 ML
SYRINGE (ML) INTRAVENOUS PRN
Status: DISCONTINUED | OUTPATIENT
Start: 2019-11-14 | End: 2019-11-14 | Stop reason: SDUPTHER

## 2019-11-14 RX ORDER — FENTANYL CITRATE 50 UG/ML
INJECTION, SOLUTION INTRAMUSCULAR; INTRAVENOUS PRN
Status: DISCONTINUED | OUTPATIENT
Start: 2019-11-14 | End: 2019-11-14 | Stop reason: SDUPTHER

## 2019-11-14 RX ORDER — NEOSTIGMINE METHYLSULFATE 1 MG/ML
INJECTION, SOLUTION INTRAVENOUS PRN
Status: DISCONTINUED | OUTPATIENT
Start: 2019-11-14 | End: 2019-11-14 | Stop reason: SDUPTHER

## 2019-11-14 RX ORDER — OXYCODONE HYDROCHLORIDE 5 MG/1
5 TABLET ORAL
Status: COMPLETED | OUTPATIENT
Start: 2019-11-14 | End: 2019-11-14

## 2019-11-14 RX ORDER — IBUPROFEN 800 MG/1
800 TABLET ORAL
Qty: 42 TABLET | Refills: 0 | Status: SHIPPED | OUTPATIENT
Start: 2019-11-14 | End: 2021-02-02

## 2019-11-14 RX ORDER — LIDOCAINE HYDROCHLORIDE 20 MG/ML
INJECTION, SOLUTION EPIDURAL; INFILTRATION; INTRACAUDAL; PERINEURAL PRN
Status: DISCONTINUED | OUTPATIENT
Start: 2019-11-14 | End: 2019-11-14 | Stop reason: SDUPTHER

## 2019-11-14 RX ORDER — ROCURONIUM BROMIDE 10 MG/ML
INJECTION, SOLUTION INTRAVENOUS PRN
Status: DISCONTINUED | OUTPATIENT
Start: 2019-11-14 | End: 2019-11-14 | Stop reason: SDUPTHER

## 2019-11-14 RX ORDER — INDOCYANINE GREEN AND WATER 25 MG
2.5 KIT INJECTION ONCE
Status: COMPLETED | OUTPATIENT
Start: 2019-11-14 | End: 2019-11-14

## 2019-11-14 RX ORDER — PROMETHAZINE HYDROCHLORIDE 25 MG/ML
12.5 INJECTION, SOLUTION INTRAMUSCULAR; INTRAVENOUS
Status: DISCONTINUED | OUTPATIENT
Start: 2019-11-14 | End: 2019-11-14 | Stop reason: HOSPADM

## 2019-11-14 RX ADMIN — PROPOFOL 200 MG: 10 INJECTION, EMULSION INTRAVENOUS at 08:35

## 2019-11-14 RX ADMIN — SODIUM CHLORIDE, POTASSIUM CHLORIDE, SODIUM LACTATE AND CALCIUM CHLORIDE: 600; 310; 30; 20 INJECTION, SOLUTION INTRAVENOUS at 09:11

## 2019-11-14 RX ADMIN — HYDROMORPHONE HYDROCHLORIDE 0.5 MG: 1 INJECTION, SOLUTION INTRAMUSCULAR; INTRAVENOUS; SUBCUTANEOUS at 10:00

## 2019-11-14 RX ADMIN — Medication 2 G: at 08:30

## 2019-11-14 RX ADMIN — ONDANSETRON HYDROCHLORIDE 4 MG: 2 INJECTION, SOLUTION INTRAMUSCULAR; INTRAVENOUS at 09:30

## 2019-11-14 RX ADMIN — Medication 0.4 MG: at 09:37

## 2019-11-14 RX ADMIN — KETOROLAC TROMETHAMINE 30 MG: 30 INJECTION, SOLUTION INTRAMUSCULAR; INTRAVENOUS at 09:48

## 2019-11-14 RX ADMIN — FENTANYL CITRATE 50 MCG: 50 INJECTION, SOLUTION INTRAMUSCULAR; INTRAVENOUS at 09:29

## 2019-11-14 RX ADMIN — SODIUM CHLORIDE, POTASSIUM CHLORIDE, SODIUM LACTATE AND CALCIUM CHLORIDE: 600; 310; 30; 20 INJECTION, SOLUTION INTRAVENOUS at 08:30

## 2019-11-14 RX ADMIN — FENTANYL CITRATE 50 MCG: 50 INJECTION, SOLUTION INTRAMUSCULAR; INTRAVENOUS at 09:51

## 2019-11-14 RX ADMIN — ROCURONIUM BROMIDE 10 MG: 10 SOLUTION INTRAVENOUS at 09:05

## 2019-11-14 RX ADMIN — FENTANYL CITRATE 100 MCG: 50 INJECTION, SOLUTION INTRAMUSCULAR; INTRAVENOUS at 08:35

## 2019-11-14 RX ADMIN — LIDOCAINE HYDROCHLORIDE 100 MG: 20 INJECTION, SOLUTION EPIDURAL; INFILTRATION; INTRACAUDAL; PERINEURAL at 08:35

## 2019-11-14 RX ADMIN — Medication 2.5 MG: at 09:37

## 2019-11-14 RX ADMIN — MIDAZOLAM 2 MG: 1 INJECTION INTRAMUSCULAR; INTRAVENOUS at 08:30

## 2019-11-14 RX ADMIN — ROCURONIUM BROMIDE 40 MG: 10 SOLUTION INTRAVENOUS at 08:35

## 2019-11-14 RX ADMIN — INDOCYANINE GREEN AND WATER 2.5 MG: KIT at 08:04

## 2019-11-14 RX ADMIN — DEXAMETHASONE SODIUM PHOSPHATE 10 MG: 4 INJECTION, SOLUTION INTRAMUSCULAR; INTRAVENOUS at 08:35

## 2019-11-14 RX ADMIN — LABETALOL HYDROCHLORIDE 5 MG: 5 INJECTION INTRAVENOUS at 09:08

## 2019-11-14 RX ADMIN — FENTANYL CITRATE 50 MCG: 50 INJECTION, SOLUTION INTRAMUSCULAR; INTRAVENOUS at 09:04

## 2019-11-14 RX ADMIN — HYDROMORPHONE HYDROCHLORIDE 0.5 MG: 1 INJECTION, SOLUTION INTRAMUSCULAR; INTRAVENOUS; SUBCUTANEOUS at 10:10

## 2019-11-14 RX ADMIN — FENTANYL CITRATE 50 MCG: 50 INJECTION, SOLUTION INTRAMUSCULAR; INTRAVENOUS at 08:50

## 2019-11-14 RX ADMIN — OXYCODONE HYDROCHLORIDE 5 MG: 5 TABLET ORAL at 11:28

## 2019-11-14 ASSESSMENT — PULMONARY FUNCTION TESTS
PIF_VALUE: 20
PIF_VALUE: 23
PIF_VALUE: 21
PIF_VALUE: 19
PIF_VALUE: 26
PIF_VALUE: 22
PIF_VALUE: 1
PIF_VALUE: 1
PIF_VALUE: 26
PIF_VALUE: 25
PIF_VALUE: 27
PIF_VALUE: 0
PIF_VALUE: 25
PIF_VALUE: 26
PIF_VALUE: 19
PIF_VALUE: 25
PIF_VALUE: 18
PIF_VALUE: 0
PIF_VALUE: 23
PIF_VALUE: 25
PIF_VALUE: 25
PIF_VALUE: 22
PIF_VALUE: 21
PIF_VALUE: 27
PIF_VALUE: 25
PIF_VALUE: 15
PIF_VALUE: 26
PIF_VALUE: 19
PIF_VALUE: 19
PIF_VALUE: 25
PIF_VALUE: 1
PIF_VALUE: 25
PIF_VALUE: 26
PIF_VALUE: 21
PIF_VALUE: 26
PIF_VALUE: 18
PIF_VALUE: 12
PIF_VALUE: 23
PIF_VALUE: 22
PIF_VALUE: 1
PIF_VALUE: 26
PIF_VALUE: 25
PIF_VALUE: 25
PIF_VALUE: 23
PIF_VALUE: 0
PIF_VALUE: 1
PIF_VALUE: 25
PIF_VALUE: 23
PIF_VALUE: 0
PIF_VALUE: 25
PIF_VALUE: 26
PIF_VALUE: 2
PIF_VALUE: 2
PIF_VALUE: 25
PIF_VALUE: 27
PIF_VALUE: 19
PIF_VALUE: 25
PIF_VALUE: 22
PIF_VALUE: 17
PIF_VALUE: 26
PIF_VALUE: 0
PIF_VALUE: 25
PIF_VALUE: 26
PIF_VALUE: 21
PIF_VALUE: 15
PIF_VALUE: 1
PIF_VALUE: 0
PIF_VALUE: 22
PIF_VALUE: 26
PIF_VALUE: 20
PIF_VALUE: 26
PIF_VALUE: 26
PIF_VALUE: 0
PIF_VALUE: 26
PIF_VALUE: 23
PIF_VALUE: 0
PIF_VALUE: 19
PIF_VALUE: 19
PIF_VALUE: 26
PIF_VALUE: 19
PIF_VALUE: 22
PIF_VALUE: 25
PIF_VALUE: 26
PIF_VALUE: 24
PIF_VALUE: 20
PIF_VALUE: 2
PIF_VALUE: 25
PIF_VALUE: 24
PIF_VALUE: 21
PIF_VALUE: 25

## 2019-11-14 ASSESSMENT — PAIN SCALES - GENERAL
PAINLEVEL_OUTOF10: 6
PAINLEVEL_OUTOF10: 5
PAINLEVEL_OUTOF10: 8
PAINLEVEL_OUTOF10: 9
PAINLEVEL_OUTOF10: 7

## 2019-11-14 ASSESSMENT — PAIN DESCRIPTION - PAIN TYPE: TYPE: SURGICAL PAIN

## 2019-11-14 ASSESSMENT — PAIN DESCRIPTION - PROGRESSION: CLINICAL_PROGRESSION: GRADUALLY IMPROVING

## 2019-11-14 ASSESSMENT — PAIN DESCRIPTION - LOCATION: LOCATION: ABDOMEN

## 2019-11-14 ASSESSMENT — PAIN DESCRIPTION - DESCRIPTORS: DESCRIPTORS: DISCOMFORT

## 2019-11-21 ENCOUNTER — TELEPHONE (OUTPATIENT)
Dept: HEMATOLOGY | Age: 39
End: 2019-11-21

## 2019-11-22 ENCOUNTER — TELEPHONE (OUTPATIENT)
Dept: HEMATOLOGY | Age: 39
End: 2019-11-22

## 2019-11-25 ENCOUNTER — OFFICE VISIT (OUTPATIENT)
Dept: HEMATOLOGY | Age: 39
End: 2019-11-25
Payer: COMMERCIAL

## 2019-11-25 VITALS
BODY MASS INDEX: 29.68 KG/M2 | HEIGHT: 71 IN | TEMPERATURE: 98.2 F | DIASTOLIC BLOOD PRESSURE: 84 MMHG | HEART RATE: 81 BPM | WEIGHT: 212 LBS | SYSTOLIC BLOOD PRESSURE: 115 MMHG | OXYGEN SATURATION: 96 %

## 2019-11-25 DIAGNOSIS — Z90.49 STATUS POST LAPAROSCOPIC CHOLECYSTECTOMY: ICD-10-CM

## 2019-11-25 DIAGNOSIS — K85.11 ACUTE BILIARY PANCREATITIS WITH UNINFECTED NECROSIS: Primary | ICD-10-CM

## 2019-11-25 PROCEDURE — 99024 POSTOP FOLLOW-UP VISIT: CPT | Performed by: TRANSPLANT SURGERY

## 2019-11-25 PROCEDURE — 99211 OFF/OP EST MAY X REQ PHY/QHP: CPT | Performed by: TRANSPLANT SURGERY

## 2019-11-26 ENCOUNTER — TELEPHONE (OUTPATIENT)
Dept: HEMATOLOGY | Age: 39
End: 2019-11-26

## 2019-12-13 ENCOUNTER — HOSPITAL ENCOUNTER (OUTPATIENT)
Dept: MRI IMAGING | Age: 39
Discharge: HOME | End: 2019-12-15
Payer: COMMERCIAL

## 2019-12-13 ENCOUNTER — HOSPITAL ENCOUNTER (OUTPATIENT)
Age: 39
Discharge: HOME | End: 2019-12-13
Payer: COMMERCIAL

## 2019-12-13 ENCOUNTER — TELEPHONE (OUTPATIENT)
Dept: HEMATOLOGY | Age: 39
End: 2019-12-13

## 2019-12-13 DIAGNOSIS — K85.11 ACUTE BILIARY PANCREATITIS WITH UNINFECTED NECROSIS: ICD-10-CM

## 2019-12-13 LAB
ANION GAP SERPL CALCULATED.3IONS-SCNC: 12 MMOL/L (ref 7–16)
BUN BLDV-MCNC: 12 MG/DL (ref 6–20)
CALCIUM SERPL-MCNC: 9.4 MG/DL (ref 8.6–10.2)
CHLORIDE BLD-SCNC: 102 MMOL/L (ref 98–107)
CO2: 26 MMOL/L (ref 22–29)
CREAT SERPL-MCNC: 1.1 MG/DL (ref 0.7–1.2)
GFR AFRICAN AMERICAN: >60
GFR NON-AFRICAN AMERICAN: >60 ML/MIN/1.73
GLUCOSE BLD-MCNC: 84 MG/DL (ref 74–99)
POTASSIUM SERPL-SCNC: 4.1 MMOL/L (ref 3.5–5)
SODIUM BLD-SCNC: 140 MMOL/L (ref 132–146)

## 2019-12-13 PROCEDURE — 74183 MRI ABD W/O CNTR FLWD CNTR: CPT

## 2019-12-13 PROCEDURE — A9579 GAD-BASE MR CONTRAST NOS,1ML: HCPCS | Performed by: RADIOLOGY

## 2019-12-13 PROCEDURE — 6360000004 HC RX CONTRAST MEDICATION: Performed by: RADIOLOGY

## 2019-12-13 PROCEDURE — 36415 COLL VENOUS BLD VENIPUNCTURE: CPT

## 2019-12-13 PROCEDURE — 80048 BASIC METABOLIC PNL TOTAL CA: CPT

## 2019-12-13 RX ADMIN — GADOTERIDOL 19 ML: 279.3 INJECTION, SOLUTION INTRAVENOUS at 09:35

## 2019-12-16 ENCOUNTER — OFFICE VISIT (OUTPATIENT)
Dept: HEMATOLOGY | Age: 39
End: 2019-12-16
Payer: COMMERCIAL

## 2019-12-16 VITALS
BODY MASS INDEX: 30.14 KG/M2 | DIASTOLIC BLOOD PRESSURE: 78 MMHG | SYSTOLIC BLOOD PRESSURE: 129 MMHG | OXYGEN SATURATION: 98 % | WEIGHT: 215.3 LBS | HEART RATE: 73 BPM | HEIGHT: 71 IN

## 2019-12-16 DIAGNOSIS — K86.3 PANCREATIC PSEUDOCYST: Primary | ICD-10-CM

## 2019-12-16 PROCEDURE — 99024 POSTOP FOLLOW-UP VISIT: CPT | Performed by: TRANSPLANT SURGERY

## 2020-02-25 ENCOUNTER — TELEPHONE (OUTPATIENT)
Dept: HEMATOLOGY | Age: 40
End: 2020-02-25

## 2020-02-25 NOTE — TELEPHONE ENCOUNTER
Called [facility] in [place] to refill patients Creon 12,000 units take 3 caps TID with meals, dispense 270 capsules and 5 refills.     Electronically signed by Rose Bradshaw RN on 2/25/2020 at 3:53 PM  s

## 2020-03-19 ENCOUNTER — TELEPHONE (OUTPATIENT)
Dept: HEMATOLOGY | Age: 40
End: 2020-03-19

## 2020-03-20 ENCOUNTER — OFFICE VISIT (OUTPATIENT)
Dept: HEMATOLOGY | Age: 40
End: 2020-03-20
Payer: COMMERCIAL

## 2020-03-20 PROCEDURE — 99212 OFFICE O/P EST SF 10 MIN: CPT | Performed by: TRANSPLANT SURGERY

## 2020-07-08 ENCOUNTER — TELEPHONE (OUTPATIENT)
Dept: HEMATOLOGY | Age: 40
End: 2020-07-08

## 2020-07-08 NOTE — TELEPHONE ENCOUNTER
LATE ENTRY~~the patient called the office yesterday and stated for the past couple of weeks he has had a lot of forgetfulness. The patient wanted to know if it could be from his creon, just wanted to start somewhere. The patient also told me he was on a antacid (pepcid). I spoke with dr Frances Espana who stated that creon does not cause forgetfulness. I did call the patient back to let him know.  He thanked me and told me that he just had to start somewhere  Electronically signed by Kam Allison MA on 7/8/2020 at 7:31 AM

## 2020-09-14 RX ORDER — PANCRELIPASE 60000; 12000; 38000 [USP'U]/1; [USP'U]/1; [USP'U]/1
CAPSULE, DELAYED RELEASE PELLETS ORAL
Qty: 810 CAPSULE | Refills: 1 | Status: SHIPPED
Start: 2020-09-14 | End: 2021-03-25 | Stop reason: ALTCHOICE

## 2020-09-25 ENCOUNTER — HOSPITAL ENCOUNTER (EMERGENCY)
Age: 40
Discharge: HOME | End: 2020-09-25
Attending: EMERGENCY MEDICINE
Payer: COMMERCIAL

## 2020-09-25 ENCOUNTER — APPOINTMENT (OUTPATIENT)
Dept: CT IMAGING | Age: 40
End: 2020-09-25
Payer: COMMERCIAL

## 2020-09-25 VITALS
BODY MASS INDEX: 30.8 KG/M2 | HEART RATE: 63 BPM | OXYGEN SATURATION: 97 % | HEIGHT: 71 IN | DIASTOLIC BLOOD PRESSURE: 75 MMHG | RESPIRATION RATE: 16 BRPM | TEMPERATURE: 97.7 F | SYSTOLIC BLOOD PRESSURE: 148 MMHG | WEIGHT: 220 LBS

## 2020-09-25 PROCEDURE — 6370000000 HC RX 637 (ALT 250 FOR IP): Performed by: NURSE PRACTITIONER

## 2020-09-25 PROCEDURE — 99284 EMERGENCY DEPT VISIT MOD MDM: CPT

## 2020-09-25 PROCEDURE — 90471 IMMUNIZATION ADMIN: CPT | Performed by: NURSE PRACTITIONER

## 2020-09-25 PROCEDURE — 70450 CT HEAD/BRAIN W/O DYE: CPT

## 2020-09-25 PROCEDURE — 6360000002 HC RX W HCPCS: Performed by: NURSE PRACTITIONER

## 2020-09-25 PROCEDURE — 99283 EMERGENCY DEPT VISIT LOW MDM: CPT

## 2020-09-25 PROCEDURE — 90715 TDAP VACCINE 7 YRS/> IM: CPT | Performed by: NURSE PRACTITIONER

## 2020-09-25 PROCEDURE — 72128 CT CHEST SPINE W/O DYE: CPT

## 2020-09-25 PROCEDURE — 72125 CT NECK SPINE W/O DYE: CPT

## 2020-09-25 RX ORDER — ACETAMINOPHEN 500 MG
1000 TABLET ORAL ONCE
Status: COMPLETED | OUTPATIENT
Start: 2020-09-25 | End: 2020-09-25

## 2020-09-25 RX ADMIN — TETANUS TOXOID, REDUCED DIPHTHERIA TOXOID AND ACELLULAR PERTUSSIS VACCINE, ADSORBED 0.5 ML: 5; 2.5; 8; 8; 2.5 SUSPENSION INTRAMUSCULAR at 13:02

## 2020-09-25 RX ADMIN — ACETAMINOPHEN 1000 MG: 500 TABLET ORAL at 13:01

## 2020-09-25 ASSESSMENT — PAIN DESCRIPTION - LOCATION: LOCATION: HEAD

## 2020-09-25 ASSESSMENT — PAIN DESCRIPTION - PAIN TYPE: TYPE: ACUTE PAIN

## 2020-09-25 ASSESSMENT — PAIN SCALES - GENERAL
PAINLEVEL_OUTOF10: 3
PAINLEVEL_OUTOF10: 3
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
## 2020-10-06 ENCOUNTER — HOSPITAL ENCOUNTER (OUTPATIENT)
Dept: PHYSICAL THERAPY | Age: 40
Setting detail: THERAPIES SERIES
Discharge: HOME | End: 2020-10-06
Payer: COMMERCIAL

## 2020-10-06 PROCEDURE — G0283 ELEC STIM OTHER THAN WOUND: HCPCS

## 2020-10-06 PROCEDURE — 97161 PT EVAL LOW COMPLEX 20 MIN: CPT
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
## 2020-10-07 ENCOUNTER — HOSPITAL ENCOUNTER (OUTPATIENT)
Dept: PHYSICAL THERAPY | Age: 40
Setting detail: THERAPIES SERIES
Discharge: HOME | End: 2020-10-07
Payer: COMMERCIAL

## 2020-10-07 PROCEDURE — G0283 ELEC STIM OTHER THAN WOUND: HCPCS

## 2020-10-07 NOTE — PROGRESS NOTES
[facility]  Phone: [phone] Fax: [phone]       Physical Therapy Daily Treatment Note  Date:  10/7/2020    Patient Name:  Chapito Oneill    :  1980  MRN: [id number]    Restrictions/Precautions:    Diagnosis:  Back Injury   Treatment Diagnosis:    Insurance/Certification information:  [id number]   Referring Physician:  Dr Jovon Brown of care signed (Y/N):    Visit# / total visits:    Pain level: /10  Time In: 900       Time Out:  925        Subjective:      Exercises:  Exercise/Equipment Resistance/Repetitions Other comments                                                                                                                                             Other Therapeutic Activities:      Home Exercise Program:      Manual Treatments:      Modalities:  Pre Mod estim Thoraco-lumbar region 20 min with heat     Timed Code Treatment Minutes:  20    Total Treatment Minutes:  25    Treatment/Activity Tolerance:  [x] Patient tolerated treatment well [] Patient limited by fatigue  [] Patient limited by pain  [] Patient limited by other medical complications  [] Other:     Plan:   [x] Continue per plan of care [] Alter current plan (see comments)  [] Plan of care initiated [] Hold pending MD visit [] Discharge  Plan for Next Session:         Treatment Charges: Mins Units   Initial Evaluation     Re-Evaluation     Ther Exercise         TE     Manual Therapy     MT     Ther Activities        TA     Gait Training          GT     Neuro Re-education NR     Modalities 20 1   Non-Billable Service Time     Other     Total Time/Units 20 1     Electronically signed by:  Eyad Mcdowell, [address] [facility]

## 2020-10-09 ENCOUNTER — HOSPITAL ENCOUNTER (OUTPATIENT)
Dept: PHYSICAL THERAPY | Age: 40
Setting detail: THERAPIES SERIES
Discharge: HOME | End: 2020-10-09
Payer: COMMERCIAL

## 2020-10-09 PROCEDURE — G0283 ELEC STIM OTHER THAN WOUND: HCPCS

## 2020-10-09 NOTE — PROGRESS NOTES
[facility]  Phone: [phone] Fax: [phone]       Physical Therapy Daily Treatment Note  Date:  10/9/2020    Patient Name:  Ira Pompa    :  1980  MRN: [id number]    Restrictions/Precautions:    Diagnosis:  Back Injury   Treatment Diagnosis:    Insurance/Certification information:  [id number]   Referring Physician:  Dr Karyle Minder of care signed (Y/N):    Visit# / total visits:  3/12  Pain level: /10  Time In: 08       Time Out:  825        Subjective:  Client reports pain symptoms continue to show improvement with treatment     Exercises:  Exercise/Equipment Resistance/Repetitions Other comments                                                                                                                                             Other Therapeutic Activities:      Home Exercise Program:      Manual Treatments:      Modalities:  Pre Mod estim Thoraco-lumbar region 20 min with heat     Timed Code Treatment Minutes:  20    Total Treatment Minutes:  25    Treatment/Activity Tolerance:  [x] Patient tolerated treatment well [] Patient limited by fatigue  [] Patient limited by pain  [] Patient limited by other medical complications  [] Other:     Plan:   [x] Continue per plan of care [] Alter current plan (see comments)  [] Plan of care initiated [] Hold pending MD visit [] Discharge  Plan for Next Session:  Plan to begin active rehab exercises next week        Treatment Charges: Mins Units   Initial Evaluation     Re-Evaluation     Ther Exercise         TE     Manual Therapy     MT     Ther Activities        TA     Gait Training          GT     Neuro Re-education NR     Modalities 20 1   Non-Billable Service Time     Other     Total Time/Units 20 1     Electronically signed by:  Delmi Recinos, [address] [place] [address]

## 2020-10-13 ENCOUNTER — HOSPITAL ENCOUNTER (OUTPATIENT)
Dept: PHYSICAL THERAPY | Age: 40
Setting detail: THERAPIES SERIES
Discharge: HOME | End: 2020-10-13
Payer: COMMERCIAL

## 2020-10-13 PROCEDURE — G0283 ELEC STIM OTHER THAN WOUND: HCPCS

## 2020-10-13 NOTE — PROGRESS NOTES
[facility]  Phone: [phone] Fax: [phone]       Physical Therapy Daily Treatment Note  Date:  10/13/2020    Patient Name:  Josh Chavarria    :  1980  MRN: [id number]    Restrictions/Precautions:    Diagnosis:  Back Injury   Treatment Diagnosis:    Insurance/Certification information:  [id number]   Referring Physician:  Dr Kirsten Webber of care signed (Y/N):    Visit# / total visits:    Pain level: /10  Time In: 08       Time Out:  825        Subjective:      Exercises:  Exercise/Equipment Resistance/Repetitions Other comments                                                                                                                                             Other Therapeutic Activities:      Home Exercise Program:      Manual Treatments:      Modalities:  Pre Mod estim Thoraco-lumbar region 20 min with heat     Timed Code Treatment Minutes:  20    Total Treatment Minutes:  25    Treatment/Activity Tolerance:  [x] Patient tolerated treatment well [] Patient limited by fatigue  [] Patient limited by pain  [] Patient limited by other medical complications  [] Other:     Plan:   [x] Continue per plan of care [] Alter current plan (see comments)  [] Plan of care initiated [] Hold pending MD visit [] Discharge  Plan for Next Session:         Treatment Charges: Mins Units   Initial Evaluation     Re-Evaluation     Ther Exercise         TE     Manual Therapy     MT     Ther Activities        TA     Gait Training          GT     Neuro Re-education NR     Modalities 20 1   Non-Billable Service Time     Other     Total Time/Units 20 1     Electronically signed by:  Froylan Bray, 311 [facility]

## 2020-10-14 ENCOUNTER — HOSPITAL ENCOUNTER (OUTPATIENT)
Dept: PHYSICAL THERAPY | Age: 40
Setting detail: THERAPIES SERIES
Discharge: HOME | End: 2020-10-14
Payer: COMMERCIAL

## 2020-10-14 PROCEDURE — G0283 ELEC STIM OTHER THAN WOUND: HCPCS

## 2020-10-14 PROCEDURE — 97110 THERAPEUTIC EXERCISES: CPT

## 2020-10-14 NOTE — PROGRESS NOTES
[facility]  Phone: [phone] Fax: [phone]       Physical Therapy Daily Treatment Note  Date:  10/14/2020    Patient Name:  Nick Guerrero    :  1980  MRN: [id number]    Restrictions/Precautions:    Diagnosis:  Back Injury   Treatment Diagnosis:    Insurance/Certification information:  [employer]   Referring Physician:  Dr Althea Newell of care signed (Y/N):    Visit# / total visits:    Pain level: /10  Time In: 0800       Time Out:   50      Subjective:      Exercises:  Exercise/Equipment Resistance/Repetitions Other comments     Bike  10 min             Prayer Str  10 reps     Standing lat trunk str    10 reps       Sustained squat  30\" x3                                                                                                            Other Therapeutic Activities:      Home Exercise Program:      Manual Treatments:      Modalities:  Pre Mod estim Thoraco-lumbar region 20 min with heat     Timed Code Treatment Minutes:  40    Total Treatment Minutes:  50    Treatment/Activity Tolerance:  [x] Patient tolerated treatment well [] Patient limited by fatigue  [] Patient limited by pain  [] Patient limited by other medical complications  [] Other:     Plan:   [x] Continue per plan of care [] Alter current plan (see comments)  [] Plan of care initiated [] Hold pending MD visit [] Discharge  Plan for Next Session:         Treatment Charges: Mins Units   Initial Evaluation     Re-Evaluation     Ther Exercise         TE 15 1   Manual Therapy     MT     Ther Activities        TA     Gait Training          GT     Neuro Re-education NR     Modalities 20 1   Non-Billable Service Time     Other     Total Time/Units 40 2     Electronically signed by:  Natalie Prakash, 311 [facility]

## 2020-10-16 ENCOUNTER — HOSPITAL ENCOUNTER (OUTPATIENT)
Dept: PHYSICAL THERAPY | Age: 40
Setting detail: THERAPIES SERIES
Discharge: HOME | End: 2020-10-16
Payer: COMMERCIAL

## 2020-10-16 PROCEDURE — G0283 ELEC STIM OTHER THAN WOUND: HCPCS

## 2020-10-16 PROCEDURE — 97110 THERAPEUTIC EXERCISES: CPT

## 2020-10-16 NOTE — PROGRESS NOTES
[facility]  Phone: [phone] Fax: [phone]       Physical Therapy Daily Treatment Note  Date:  10/16/2020    Patient Name:  Chris Purvis    :  1980  MRN: [id number]    Restrictions/Precautions:    Diagnosis:  Back Injury   Treatment Diagnosis:    Insurance/Certification information:  [employer]   Referring Physician:  Dr Yarelis Foss of care signed (Y/N):    Visit# / total visits:    Pain level: /10  Time In: 0800       Time Out:   0850      Subjective:  Client reports that the pain affecting his right thoraco-luimbar area is still present but the overall pain intensity has decreased and client notes he is able to move through a greater ROM with less pain     Exercises:  Exercise/Equipment Resistance/Repetitions Other comments     Bike  10 min             Prayer Str  10 reps     Standing lat trunk str    10 reps       Sustained squat  30\" x3                                                                                                            Other Therapeutic Activities:      Home Exercise Program:      Manual Treatments:      Modalities:  Pre Mod estim Thoraco-lumbar region 20 min with heat     Timed Code Treatment Minutes:  40    Total Treatment Minutes:  50    Treatment/Activity Tolerance:  [x] Patient tolerated treatment well [] Patient limited by fatigue  [] Patient limited by pain  [] Patient limited by other medical complications  [] Other:     Plan:   [x] Continue per plan of care [] Alter current plan (see comments)  [] Plan of care initiated [] Hold pending MD visit [] Discharge  Plan for Next Session:         Treatment Charges: Mins Units   Initial Evaluation     Re-Evaluation     Ther Exercise         TE 15 1   Manual Therapy     MT     Ther Activities        TA     Gait Training          GT     Neuro Re-education NR     Modalities 20 1   Non-Billable Service Time     Other     Total Time/Units 40 2     Electronically signed by:  Jose F Medina Leslie, 311 [facility]

## 2020-10-19 ENCOUNTER — HOSPITAL ENCOUNTER (OUTPATIENT)
Dept: PHYSICAL THERAPY | Age: 40
Setting detail: THERAPIES SERIES
Discharge: HOME | End: 2020-10-19
Payer: COMMERCIAL

## 2020-10-19 PROCEDURE — G0283 ELEC STIM OTHER THAN WOUND: HCPCS

## 2020-10-19 PROCEDURE — 97110 THERAPEUTIC EXERCISES: CPT

## 2020-10-21 ENCOUNTER — HOSPITAL ENCOUNTER (OUTPATIENT)
Dept: PHYSICAL THERAPY | Age: 40
Setting detail: THERAPIES SERIES
Discharge: HOME | End: 2020-10-21
Payer: COMMERCIAL

## 2020-10-21 PROCEDURE — G0283 ELEC STIM OTHER THAN WOUND: HCPCS

## 2020-10-21 PROCEDURE — 97110 THERAPEUTIC EXERCISES: CPT

## 2020-10-21 NOTE — PROGRESS NOTES
[facility]  Phone: [phone] Fax: [phone]       Physical Therapy Daily Treatment Note  Date:  10/21/2020    Patient Name:  Josh Chavarria    :  1980  MRN: [id number]    Restrictions/Precautions:    Diagnosis:  Back Injury   Treatment Diagnosis:    Insurance/Certification information:  [employer]   Referring Physician:  Dr Kirsten Webber of care signed (Y/N):    Visit# / total visits:    Pain level: /10  Time In: 0800       Time Out:   0850      Subjective:      Exercises:  Exercise/Equipment Resistance/Repetitions Other comments     Bike  10 min             Prayer Str  10 reps     Standing lat trunk str    10 reps       Sustained squat  30\" x3             Cybex 360 Pull down  3 plates 10 reps 2 sets      Cybex 360 row  3 plates 10 reps 2 sets                                                                                       Other Therapeutic Activities:  Crate Lift 5 reps 2 sets                                                        Crate carry 5 reps 2 sets     Home Exercise Program:      Manual Treatments:      Modalities:  Pre Mod estim Thoraco-lumbar region 20 min with heat     Timed Code Treatment Minutes:  40    Total Treatment Minutes:  50    Treatment/Activity Tolerance:  [x] Patient tolerated treatment well [] Patient limited by fatigue  [] Patient limited by pain  [] Patient limited by other medical complications  [] Other:     Plan:   [x] Continue per plan of care [] Alter current plan (see comments)  [] Plan of care initiated [] Hold pending MD visit [] Discharge  Plan for Next Session:         Treatment Charges: Mins Units   Initial Evaluation     Re-Evaluation     Ther Exercise         TE 20 1   Manual Therapy     MT     Ther Activities        TA     Gait Training          GT     Neuro Re-education NR     Modalities 20 1   Non-Billable Service Time     Other     Total Time/Units 40 2     Electronically signed by:  Froylan Bray, PT [id number]

## 2020-10-26 ENCOUNTER — HOSPITAL ENCOUNTER (OUTPATIENT)
Dept: PHYSICAL THERAPY | Age: 40
Setting detail: THERAPIES SERIES
End: 2020-10-26
Payer: COMMERCIAL

## 2020-10-26 NOTE — PROGRESS NOTES
[facility]  Phone: [phone] Fax: [phone]     Physical Therapy  Cancellation/No-show Note  Patient Name:  Kathleen Sue  :  1980   Date:  10/26/2020    For today's appointment patient:  [x]  Cancelled  []  Rescheduled appointment  []  No-show     Reason given by patient:  [x]  Patient ill  []  Conflicting appointment  []  No transportation    []  Conflict with work  []  No reason given  [x]  Other:     Comments:  Next PT appointment 10/28/2020    Electronically signed by:  Eitan Baires , [address] [facility]

## 2020-10-29 ENCOUNTER — HOSPITAL ENCOUNTER (OUTPATIENT)
Dept: PHYSICAL THERAPY | Age: 40
Setting detail: THERAPIES SERIES
Discharge: HOME | End: 2020-10-29
Payer: COMMERCIAL

## 2020-10-29 PROCEDURE — 97110 THERAPEUTIC EXERCISES: CPT

## 2020-10-29 PROCEDURE — G0283 ELEC STIM OTHER THAN WOUND: HCPCS

## 2020-10-29 NOTE — FLOWSHEET NOTE
[facility]  Phone: [phone] Fax: [phone]     [employer] Rehabilitation Initial Evaluation      Client Name: hCrista Navarro     Claim Number:[id number]  Evaluation date:10/06/2020                                   Job Title :    Diagnosis:Back Injury [id number]                        Normal Work Hrs:  40/wk  Referring Physician: Dr Angelito Loomis                           Present work status:   ________  First date of Lost time:09/24/2020                            * Not working  __X_  Date of Injury: 09/24/2020                                           Riddhi Redmond Duty    ____  Employer:[employer]                                    * Reg.  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     Rehabilitation Problems/Impairments:  [x]Pain  4-6_/10 right Thoraco-lumbar region   [x]Decreased ROM:Thoracic and Lumbar spines   []Joint Hypomobility:______________________________________  []Joint Hypermobility:______________________________________  []Muscle Spasms:_________________________________________  []Gait: __________________________________________________  [x]Decreased strength:Thoraco-lumbar region   [employer] body mechanics related to work/home activities  [x]Subjective reports of pain limiting work/home activities  []Inability to control onset symptoms  [x]Load handling strength levels below employable levels  [x]Work endurances less than required for employment levels  []Other:__________________________      Short Term Rehabilitation Goals:                    [x] Decrease pain 4/10:or less [x] Increase ROM:___________________________________________                    [] Improve Joint play:________________________________________                    [] Improve strength:_________________________________________                    []Gait: __________________________________________________                    [] Reduce Muscle Spasms:____________________________________                    [] Improve Body Mechanics associated with work/Home activities. [x] Instruct with symptom control techniques/ HEP                    [] Improve endurance levels                     [] Lift floor-waist __________#                     [] Lift waist-shoulder _______#                    [] Horizontal carry _________Ft ________#                    [] Elevated work ________min _________#                    [] Other___________________________________    Long Term Rehabilitation Goals:   [x]RTW  ( SJ/SE,   [x]Decrease Pain 3/10 or less with ADL's and work tasks   [x]Improve AROM  To WFL's   [x]Improve Strength to 4- to 4/5 _  []Improve Joint play (WNL), _______________________________  []Body Mechanics:________________________________________              []Gait ___________________________________________________  [x]Independent with HEP/Symptom control techniques:  []Lift Floor-waist_____#  []Lift waist-Shoulder________#  []Horizontal Carry ______ft______#  []Elevated work ____min____#  []Other:_________________________________________________    Rehabilitation Recommendations:                    ?[x] Begin PT at _2-3_____x/wk x _4__wks. []Begin PT with Work Conditioning to follow at ______wks. []Begin Work-Conditioning and conclude with FCE _____                   []? Begin PT with Vocational Rehabilitation referral/request--[facility]                   ? [] Ergonomic Study/Job Analysis to compare FCE/clients abilities. ____    Recommendations For RTW accommodations:  None at this time           Physician: ____________________  Therapist: [facility], [address]  : ____________________

## 2020-10-29 NOTE — PROGRESS NOTES
[facility]  Phone: [phone] Fax: [phone]       Physical Therapy Daily Treatment Note  Date:  10/29/2020    Patient Name:  Ebonie Chao    :  1980  MRN: [id number]    Restrictions/Precautions:    Diagnosis:  Back Injury   Treatment Diagnosis:    Insurance/Certification information:  [employer] [id number]   Referring Physician:  Dr Yomaira Euceda of care signed (Y/N):    Visit# / total visits:    Pain level: /10  Time In: 0800       Time Out:   0850      Subjective:  Client reports he has been having some GI issues secondary to changes in meds, which he is taking for an unrelated situation.  Client also notes some modest and intermittent discomfort but no major pain issues     Exercises:  Exercise/Equipment Resistance/Repetitions Other comments     Bike  10 min             Prayer Str  10 reps     Standing lat trunk str    10 reps       Sustained squat  30\" x3             Cybex 360 Pull down  3 plates 10 reps 2 sets      Cybex 360 row  3 plates 10 reps 2 sets                                                                                       Other Therapeutic Activities:  Crate Lift 5 reps 2 sets                                                        Crate carry 5 reps 2 sets     Home Exercise Program:      Manual Treatments:      Modalities:  Pre Mod estim Thoraco-lumbar region 20 min with heat     Timed Code Treatment Minutes:  40    Total Treatment Minutes:  50    Treatment/Activity Tolerance:  [x] Patient tolerated treatment well [] Patient limited by fatigue  [] Patient limited by pain  [] Patient limited by other medical complications  [] Other:     Plan:   [x] Continue per plan of care [] Alter current plan (see comments)  [] Plan of care initiated [] Hold pending MD visit [] Discharge  Plan for Next Session:         Treatment Charges: Mins Units   Initial Evaluation     Re-Evaluation     Ther Exercise         TE 20 1   Manual Therapy     MT     Ther Activities        TA     Gait Training          GT     Neuro Re-education NR     Modalities 20 1   Non-Billable Service Time     Other     Total Time/Units 40 2     Electronically signed by:  Fransisco Corrigan, [address] [facility]

## 2020-10-30 ENCOUNTER — HOSPITAL ENCOUNTER (OUTPATIENT)
Dept: PHYSICAL THERAPY | Age: 40
Setting detail: THERAPIES SERIES
Discharge: HOME | End: 2020-10-30
Payer: COMMERCIAL

## 2020-10-30 PROCEDURE — G0283 ELEC STIM OTHER THAN WOUND: HCPCS

## 2020-10-30 PROCEDURE — 97110 THERAPEUTIC EXERCISES: CPT

## 2020-10-30 NOTE — PROGRESS NOTES
[facility]  Phone: [phone] Fax: [phone]       Physical Therapy Daily Treatment Note  Date:  10/30/2020    Patient Name:  Abby Melendez    :  1980  MRN: [id number]    Restrictions/Precautions:    Diagnosis:  Back Injury   Treatment Diagnosis:    Insurance/Certification information:  [employer]   Referring Physician:  Dr Mango Johns of care signed (Y/N):    Visit# / total visits:  10/12  Pain level: /10  Time In: 1000       Time Out:   1050      Subjective:      Exercises:  Exercise/Equipment Resistance/Repetitions Other comments     Bike  10 min             Prayer Str  10 reps     Standing lat trunk str    10 reps       Sustained squat  30\" x3             Cybex 360 Pull down  3 plates 10 reps 2 sets      Cybex 360 row  3 plates 10 reps 2 sets                                                                                       Other Therapeutic Activities:  Crate Lift 5 reps 2 sets                                                        Crate carry 5 reps 2 sets     Home Exercise Program:      Manual Treatments:      Modalities:  Pre Mod estim Thoraco-lumbar region 20 min with heat     Timed Code Treatment Minutes:  40    Total Treatment Minutes:  50    Treatment/Activity Tolerance:  [x] Patient tolerated treatment well [] Patient limited by fatigue  [] Patient limited by pain  [] Patient limited by other medical complications  [] Other:     Plan:   [x] Continue per plan of care [] Alter current plan (see comments)  [] Plan of care initiated [] Hold pending MD visit [] Discharge  Plan for Next Session:         Treatment Charges: Mins Units   Initial Evaluation     Re-Evaluation     Ther Exercise         TE 20 1   Manual Therapy     MT     Ther Activities        TA     Gait Training          GT     Neuro Re-education NR     Modalities 20 1   Non-Billable Service Time     Other     Total Time/Units 40 2     Electronically signed by:  Mary Tello PT [id number]

## 2020-11-02 ENCOUNTER — HOSPITAL ENCOUNTER (OUTPATIENT)
Dept: PHYSICAL THERAPY | Age: 40
Setting detail: THERAPIES SERIES
Discharge: HOME | End: 2020-11-02
Payer: COMMERCIAL

## 2020-11-02 PROCEDURE — 97110 THERAPEUTIC EXERCISES: CPT

## 2020-11-02 NOTE — PROGRESS NOTES
[facility]  Phone: [phone] Fax: [phone]       Physical Therapy Daily Treatment Note  Date:  2020    Patient Name:  Slime Dexter    :  1980  MRN: [id number]    Restrictions/Precautions:    Diagnosis:  Back Injury   Treatment Diagnosis:    Insurance/Certification information:  [employer]   Referring Physician:  Dr Jhon Barnes of care signed (Y/N):    Visit# / total visits:    Pain level: /10  Time In: 1000       Time Out:   1050      Subjective:      Exercises:  Exercise/Equipment Resistance/Repetitions Other comments     Bike  10 min             Prayer Str  10 reps     Standing lat trunk str    10 reps       Sustained squat  30\" x3             Cybex 360 Pull down  3 plates 10 reps 2 sets      Cybex 360 row  3 plates 10 reps 2 sets                                                                                       Other Therapeutic Activities:  Crate Lift 5 reps 2 sets                                                        Crate carry 5 reps 2 sets     Home Exercise Program:      Manual Treatments:      Modalities:  Pre Mod estim Thoraco-lumbar region 20 min with heat     Timed Code Treatment Minutes:  40    Total Treatment Minutes:  50    Treatment/Activity Tolerance:  [x] Patient tolerated treatment well [] Patient limited by fatigue  [] Patient limited by pain  [] Patient limited by other medical complications  [] Other:     Plan:   [x] Continue per plan of care [] Alter current plan (see comments)  [] Plan of care initiated [] Hold pending MD visit [] Discharge  Plan for Next Session:         Treatment Charges: Mins Units   Initial Evaluation     Re-Evaluation     Ther Exercise         TE 20 1   Manual Therapy     MT     Ther Activities        TA     Gait Training          GT     Neuro Re-education NR     Modalities 20 1   Non-Billable Service Time     Other     Total Time/Units 40 2     Electronically signed by:  Osbaldo Dorantes PT [id number]

## 2020-11-04 ENCOUNTER — HOSPITAL ENCOUNTER (OUTPATIENT)
Dept: PHYSICAL THERAPY | Age: 40
Setting detail: THERAPIES SERIES
Discharge: HOME | End: 2020-11-04
Payer: COMMERCIAL

## 2020-11-04 PROCEDURE — 97110 THERAPEUTIC EXERCISES: CPT

## 2020-12-07 ENCOUNTER — APPOINTMENT (OUTPATIENT)
Dept: GENERAL RADIOLOGY | Age: 40
End: 2020-12-07
Payer: COMMERCIAL

## 2020-12-07 ENCOUNTER — HOSPITAL ENCOUNTER (EMERGENCY)
Age: 40
Discharge: HOME | End: 2020-12-07
Payer: COMMERCIAL

## 2020-12-07 VITALS
WEIGHT: 220 LBS | HEIGHT: 71 IN | RESPIRATION RATE: 16 BRPM | HEART RATE: 83 BPM | SYSTOLIC BLOOD PRESSURE: 193 MMHG | TEMPERATURE: 97.3 F | OXYGEN SATURATION: 98 % | DIASTOLIC BLOOD PRESSURE: 90 MMHG | BODY MASS INDEX: 30.8 KG/M2

## 2020-12-07 LAB
ABO/RH: NORMAL
ALBUMIN SERPL-MCNC: 4.1 G/DL (ref 3.5–5.2)
ALP BLD-CCNC: 61 U/L (ref 40–129)
ALT SERPL-CCNC: 22 U/L (ref 0–40)
ANION GAP SERPL CALCULATED.3IONS-SCNC: 8 MMOL/L (ref 7–16)
ANTIBODY SCREEN: NORMAL
AST SERPL-CCNC: 22 U/L (ref 0–39)
BASOPHILS ABSOLUTE: 0.03 E9/L (ref 0–0.2)
BASOPHILS RELATIVE PERCENT: 0.4 % (ref 0–2)
BILIRUB SERPL-MCNC: 0.4 MG/DL (ref 0–1.2)
BILIRUBIN URINE: ABNORMAL
BLOOD, URINE: NEGATIVE
BUN BLDV-MCNC: 10 MG/DL (ref 6–20)
C-REACTIVE PROTEIN: <0.1 MG/DL (ref 0–0.4)
CALCIUM SERPL-MCNC: 8.8 MG/DL (ref 8.6–10.2)
CHLORIDE BLD-SCNC: 103 MMOL/L (ref 98–107)
CLARITY: CLEAR
CO2: 25 MMOL/L (ref 22–29)
COLOR: YELLOW
CREAT SERPL-MCNC: 1 MG/DL (ref 0.7–1.2)
EOSINOPHILS ABSOLUTE: 0.09 E9/L (ref 0.05–0.5)
EOSINOPHILS RELATIVE PERCENT: 1.2 % (ref 0–6)
GFR AFRICAN AMERICAN: >60
GFR NON-AFRICAN AMERICAN: >60 ML/MIN/1.73
GLUCOSE BLD-MCNC: 118 MG/DL (ref 74–99)
GLUCOSE URINE: NEGATIVE MG/DL
HCT VFR BLD CALC: 39.6 % (ref 37–54)
HEMOGLOBIN: 12.9 G/DL (ref 12.5–16.5)
IMMATURE GRANULOCYTES #: 0.03 E9/L
IMMATURE GRANULOCYTES %: 0.4 % (ref 0–5)
KETONES, URINE: ABNORMAL MG/DL
LACTIC ACID: 1 MMOL/L (ref 0.5–2.2)
LEUKOCYTE ESTERASE, URINE: NEGATIVE
LYMPHOCYTES ABSOLUTE: 1.01 E9/L (ref 1.5–4)
LYMPHOCYTES RELATIVE PERCENT: 13.7 % (ref 20–42)
MCH RBC QN AUTO: 29 PG (ref 26–35)
MCHC RBC AUTO-ENTMCNC: 32.6 % (ref 32–34.5)
MCV RBC AUTO: 89 FL (ref 80–99.9)
MONOCYTES ABSOLUTE: 0.49 E9/L (ref 0.1–0.95)
MONOCYTES RELATIVE PERCENT: 6.6 % (ref 2–12)
NEUTROPHILS ABSOLUTE: 5.72 E9/L (ref 1.8–7.3)
NEUTROPHILS RELATIVE PERCENT: 77.7 % (ref 43–80)
NITRITE, URINE: NEGATIVE
PDW BLD-RTO: 15.6 FL (ref 11.5–15)
PH UA: 6 (ref 5–9)
PLATELET # BLD: 234 E9/L (ref 130–450)
PMV BLD AUTO: 10.2 FL (ref 7–12)
POTASSIUM SERPL-SCNC: 3.7 MMOL/L (ref 3.5–5)
PROTEIN UA: NEGATIVE MG/DL
RBC # BLD: 4.45 E12/L (ref 3.8–5.8)
SEDIMENTATION RATE, ERYTHROCYTE: 1 MM/HR (ref 0–15)
SODIUM BLD-SCNC: 136 MMOL/L (ref 132–146)
SPECIFIC GRAVITY UA: >=1.03 (ref 1–1.03)
TOTAL PROTEIN: 6.8 G/DL (ref 6.4–8.3)
UROBILINOGEN, URINE: 0.2 E.U./DL
WBC # BLD: 7.4 E9/L (ref 4.5–11.5)

## 2020-12-07 PROCEDURE — 86850 RBC ANTIBODY SCREEN: CPT

## 2020-12-07 PROCEDURE — 99282 EMERGENCY DEPT VISIT SF MDM: CPT

## 2020-12-07 PROCEDURE — U0003 INFECTIOUS AGENT DETECTION BY NUCLEIC ACID (DNA OR RNA); SEVERE ACUTE RESPIRATORY SYNDROME CORONAVIRUS 2 (SARS-COV-2) (CORONAVIRUS DISEASE [COVID-19]), AMPLIFIED PROBE TECHNIQUE, MAKING USE OF HIGH THROUGHPUT TECHNOLOGIES AS DESCRIBED BY CMS-2020-01-R: HCPCS

## 2020-12-07 PROCEDURE — 81003 URINALYSIS AUTO W/O SCOPE: CPT

## 2020-12-07 PROCEDURE — 93005 ELECTROCARDIOGRAM TRACING: CPT | Performed by: PHYSICIAN ASSISTANT

## 2020-12-07 PROCEDURE — 86900 BLOOD TYPING SEROLOGIC ABO: CPT

## 2020-12-07 PROCEDURE — 71046 X-RAY EXAM CHEST 2 VIEWS: CPT

## 2020-12-07 PROCEDURE — 86901 BLOOD TYPING SEROLOGIC RH(D): CPT

## 2020-12-07 PROCEDURE — 85651 RBC SED RATE NONAUTOMATED: CPT

## 2020-12-07 PROCEDURE — 83605 ASSAY OF LACTIC ACID: CPT

## 2020-12-07 PROCEDURE — 86140 C-REACTIVE PROTEIN: CPT

## 2020-12-07 PROCEDURE — 80053 COMPREHEN METABOLIC PANEL: CPT

## 2020-12-07 PROCEDURE — 85025 COMPLETE CBC W/AUTO DIFF WBC: CPT

## 2020-12-07 RX ORDER — 0.9 % SODIUM CHLORIDE 0.9 %
1000 INTRAVENOUS SOLUTION INTRAVENOUS ONCE
Status: DISCONTINUED | OUTPATIENT
Start: 2020-12-07 | End: 2020-12-07 | Stop reason: HOSPADM

## 2020-12-07 ASSESSMENT — PAIN DESCRIPTION - LOCATION: LOCATION: GENERALIZED

## 2020-12-07 ASSESSMENT — PAIN DESCRIPTION - PAIN TYPE: TYPE: ACUTE PAIN

## 2020-12-07 NOTE — ED PROVIDER NOTES
[facility]  Department of Emergency Medicine   ED  Encounter Note  Admit Date/RoomTime: 2020  6:12 PM  ED Room:   NAME: Krystian Doherty  : 1980  MRN: [id number]     Chief Complaint:  Anemia (hx of anemia, hx of iron transfusions, body aches, headache, hands and feet tingling, leg cramps)    History of Present Illness        Krystian Doherty is a 41 y.o. male who presents to the ED by private vehicle for fatigue ongoing since September with headache and tingling in hands and feet over the past 2 weeks, beginning several months to a few days ago. The complaint has been gradually worsening and are mild in severity. He does get iron infusions and just had one last week and he reports these usually help but this time has not been feeling any better. He did have neck injury back in September when a beam fell on his head. He has been following with physical therapy and did have imaging at the time which was negative for fracture. He is currently not really having any neck pain. Patient also does have a history of anxiety and he reports this has been worsening recently with everything going on the road with Covid and all of his health problems. She denies fever, nausea, vomiting, upper respiratory symptoms. ROS   Pertinent positives and negatives are stated within HPI, all other systems reviewed and are negative. Past Medical History:  has a past medical history of Anemia, GERD (gastroesophageal reflux disease), History of blood transfusion, Hyperlipidemia, IBS (irritable bowel syndrome), and DAJUAN on CPAP. Surgical History:  has a past surgical history that includes Upper gastrointestinal endoscopy (2017); Ankle surgery (Right, 2014); Colonoscopy (2017); ERCP (N/A, 2019); and Cholecystectomy, laparoscopic (N/A, 2019). Social History:  reports that he quit smoking about 5 years ago. His smoking use included cigarettes. He has a 15.00 pack-year smoking history. He has never used smokeless tobacco. He reports previous alcohol use of about 3.0 standard drinks of alcohol per week. He reports that he does not use drugs. Family History: family history is not on file. Allergies: Patient has no known allergies. Physical Exam   Oxygen Saturation Interpretation: Normal.        ED Triage Vitals   BP Temp Temp src Pulse Resp SpO2 Height Weight   12/07/20 1508 12/07/20 1459 -- 12/07/20 1459 12/07/20 1508 12/07/20 1459 12/07/20 1508 12/07/20 1508   (!) 193/90 97.3 °F (36.3 °C)  84 16 98 % 5' 11\" (1.803 m) 220 lb (99.8 kg)         Physical Exam  Constitutional/General: Alert and oriented x3, well appearing, non toxic  HEENT:  NC/NT. PERRLA,  Airway patent. TM normal, oral mucosa moist, posterior pharynx unremarkable. Nasal passages clear. Neck: Supple, full ROM, non tender to palpation in the midline, no stridor, no crepitus, no meningeal signs, no thyromegaly. Respiratory: Lungs clear to auscultation bilaterally, no wheezes, rales, or rhonchi. Not in respiratory distress  CV:  Regular rate. Regular rhythm. No murmurs, gallops, or rubs. 2+ distal pulses  Chest: No chest wall tenderness  GI:  Abdomen Soft, Non tender, Non distended. +BS. No rebound, guarding, or rigidity. No pulsatile masses. Musculoskeletal: Moves all extremities x 4. Warm and well perfused, no clubbing, cyanosis, or edema. Capillary refill <3 seconds,   Integument: skin warm and dry. No rashes. Lymphatic: no lymphadenopathy noted  Neurologic: GCS 15, no focal deficits, symmetric strength 5/5 in the upper and lower extremities bilaterally, bilateral patellar brachioradialis bicep reflexes +2/4.    Psychiatric: Normal Affect    Lab / Imaging Results   (All laboratory and radiology results have been personally reviewed by myself)  Labs:  Results for orders placed or performed during the hospital encounter of 12/07/20   CBC Auto Differential   Result Value Ref Range    WBC 7.4 4.5 - 11.5 E9/L    RBC 4.45 3.80 - 5.80 E12/L    Hemoglobin 12.9 12.5 - 16.5 g/dL    Hematocrit 39.6 37.0 - 54.0 %    MCV 89.0 80.0 - 99.9 fL    MCH 29.0 26.0 - 35.0 pg    MCHC 32.6 32.0 - 34.5 %    RDW 15.6 (H) 11.5 - 15.0 fL    Platelets 351 558 - 818 E9/L    MPV 10.2 7.0 - 12.0 fL    Neutrophils % 77.7 43.0 - 80.0 %    Immature Granulocytes % 0.4 0.0 - 5.0 %    Lymphocytes % 13.7 (L) 20.0 - 42.0 %    Monocytes % 6.6 2.0 - 12.0 %    Eosinophils % 1.2 0.0 - 6.0 %    Basophils % 0.4 0.0 - 2.0 %    Neutrophils Absolute 5.72 1.80 - 7.30 E9/L    Immature Granulocytes # 0.03 E9/L    Lymphocytes Absolute 1.01 (L) 1.50 - 4.00 E9/L    Monocytes Absolute 0.49 0.10 - 0.95 E9/L    Eosinophils Absolute 0.09 0.05 - 0.50 E9/L    Basophils Absolute 0.03 0.00 - 0.20 E9/L   Comprehensive Metabolic Panel   Result Value Ref Range    Sodium 136 132 - 146 mmol/L    Potassium 3.7 3.5 - 5.0 mmol/L    Chloride 103 98 - 107 mmol/L    CO2 25 22 - 29 mmol/L    Anion Gap 8 7 - 16 mmol/L    Glucose 118 (H) 74 - 99 mg/dL    BUN 10 6 - 20 mg/dL    CREATININE 1.0 0.7 - 1.2 mg/dL    GFR Non-African American >60 >=60 mL/min/1.73    GFR African American >60     Calcium 8.8 8.6 - 10.2 mg/dL    Total Protein 6.8 6.4 - 8.3 g/dL    Alb 4.1 3.5 - 5.2 g/dL    Total Bilirubin 0.4 0.0 - 1.2 mg/dL    Alkaline Phosphatase 61 40 - 129 U/L    ALT 22 0 - 40 U/L    AST 22 0 - 39 U/L   Lactic Acid, Plasma   Result Value Ref Range    Lactic Acid 1.0 0.5 - 2.2 mmol/L   Urinalysis   Result Value Ref Range    Color, UA Yellow Straw/Yellow    Clarity, UA Clear Clear    Glucose, Ur Negative Negative mg/dL    Bilirubin Urine SMALL (A) Negative    Ketones, Urine TRACE (A) Negative mg/dL    Specific Gravity, UA >=1.030 1.005 - 1.030    Blood, Urine Negative Negative    pH, UA 6.0 5.0 - 9.0    Protein, UA Negative Negative mg/dL    Urobilinogen, Urine 0.2 <2.0 E.U./dL    Nitrite, Urine Negative Negative    Leukocyte Esterase, Urine Negative Negative Sedimentation Rate   Result Value Ref Range    Sed Rate 1 0 - 15 mm/Hr   C-Reactive Protein   Result Value Ref Range    CRP <0.1 0.0 - 0.4 mg/dL   Covid-19 Ambulatory   Result Value Ref Range    Source OP swab    EKG 12 Lead   Result Value Ref Range    Ventricular Rate 59 BPM    Atrial Rate 59 BPM    P-R Interval 112 ms    QRS Duration 90 ms    Q-T Interval 406 ms    QTc Calculation (Bazett) 401 ms    P Axis 12 degrees    R Axis 24 degrees    T Axis 14 degrees   TYPE AND SCREEN   Result Value Ref Range    ABO/Rh A POS     Antibody Screen NEG      Imaging: All Radiology results interpreted by Radiologist unless otherwise noted. XR CHEST (2 VW)   Final Result   No acute process. ED Course / Medical Decision Making     Medications - No data to display     Consult(s):   None    Procedure(s):   none    MDM:   Patient presents to emergency with fatigue for the last 3 months in the setting of a history of low iron. He does get iron infusions when he starts to feel fatigued and they usually help but recently have not been. He also has been reporting tingling in numbness in his hands and feet bilaterally. Patient does follow with chronic back pain and recently has had a cervical injury and is following with physical therapy for this. No neurologic deficits noted in the hands or feet, reflexes bilaterally intact strength in the upper and lower extremity normal.  Patient's blood work does not show any signs of anemia. Patient recently has been complaining of body aches and headache. Coronavirus test will be sent out patient is advised to stay home and quarantine until he gets result. If result is negative he may return back to work. Follow-up with primary care doctor for further blood work and testing as needed. Plan of Care/Counseling:  I reviewed today's visit with the patient in addition to providing specific details for the plan of care and counseling regarding the diagnosis and prognosis. Questions are answered at this time and are agreeable with the plan. Assessment      1. Paresthesia of both hands    2. Paresthesia of both feet    3. Body aches      Plan   Discharge to home. Patient condition is stable    New Medications     Discharge Medication List as of 12/7/2020  6:52 PM        Electronically signed by Raul Lo PA-C   DD: 12/7/20  **This report was transcribed using voice recognition software. Every effort was made to ensure accuracy; however, inadvertent computerized transcription errors may be present.   END OF ED PROVIDER NOTE       Raul Lo PA-C  12/08/20 0041

## 2020-12-07 NOTE — ED PROVIDER NOTES
FIRST PROVIDER CONTACT ASSESSMENT NOTE      Department of Emergency Medicine   ED  First Provider Note   12/7/20  3:11 PM EST    Chief Complaint: Anemia (hx of anemia, hx of iron transfusions, body aches, headache, hands and feet tingling, leg cramps)      History of Present Illness:    Ira Pompa is a 41 y.o. male who presents to the ED by private car for concern for recurrent anemia. He follows with a hematologist.  Past history of recurrent anemia with multiple blood transfusions. He follows at the [facility]. Patient states that he is having symptoms of body wide pain and numbness and tingling in his hands that have been concerned that his hemoglobin has dropped again. No known exposure to COVID-19      Focused Screening Exam:  Constitutional:  Alert, appears stated age and is in no distress. *ALLERGIES*     Patient has no known allergies.      ED Triage Vitals   BP Temp Temp src Pulse Resp SpO2 Height Weight   12/07/20 1508 12/07/20 1459 -- 12/07/20 1459 12/07/20 1508 12/07/20 1459 12/07/20 1508 12/07/20 1508   (!) 193/90 97.3 °F (36.3 °C)  84 16 98 % 5' 11\" (1.803 m) 220 lb (99.8 kg)        Initial Plan of Care:  Initiate Treatment-Testing, Proceed toTreatment Area When Bed Available for ED Attending/MLP to Continue Care    -----------------END OF FIRST PROVIDER CONTACT ASSESSMENT NOTE--------------  Electronically signed by Heidi Stapleton PA-C   DD: 12/7/20       Heidi Stapleton PA-C  12/07/20 5791

## 2020-12-08 LAB
EKG ATRIAL RATE: 59 BPM
EKG P AXIS: 12 DEGREES
EKG P-R INTERVAL: 112 MS
EKG Q-T INTERVAL: 406 MS
EKG QRS DURATION: 90 MS
EKG QTC CALCULATION (BAZETT): 401 MS
EKG R AXIS: 24 DEGREES
EKG T AXIS: 14 DEGREES
EKG VENTRICULAR RATE: 59 BPM

## 2020-12-08 PROCEDURE — 93010 ELECTROCARDIOGRAM REPORT: CPT | Performed by: INTERNAL MEDICINE

## 2020-12-09 LAB
SARS-COV-2: NOT DETECTED
SOURCE: NORMAL

## 2020-12-15 ENCOUNTER — HOSPITAL ENCOUNTER (OUTPATIENT)
Dept: NUCLEAR MEDICINE | Age: 40
Discharge: HOME | End: 2020-12-15
Payer: COMMERCIAL

## 2020-12-15 PROCEDURE — A9512 TC99M PERTECHNETATE: HCPCS | Performed by: RADIOLOGY

## 2020-12-15 PROCEDURE — 3430000000 HC RX DIAGNOSTIC RADIOPHARMACEUTICAL: Performed by: RADIOLOGY

## 2020-12-15 PROCEDURE — 78290 INTESTINE IMAGING: CPT

## 2020-12-15 RX ADMIN — Medication 5 MILLICURIE: at 13:18

## 2021-02-01 ENCOUNTER — HOSPITAL ENCOUNTER (EMERGENCY)
Age: 41
Discharge: HOME | End: 2021-02-01
Attending: EMERGENCY MEDICINE
Payer: COMMERCIAL

## 2021-02-01 ENCOUNTER — APPOINTMENT (OUTPATIENT)
Dept: GENERAL RADIOLOGY | Age: 41
End: 2021-02-01
Payer: COMMERCIAL

## 2021-02-01 ENCOUNTER — TELEPHONE (OUTPATIENT)
Dept: ORTHOPEDIC SURGERY | Age: 41
End: 2021-02-01

## 2021-02-01 VITALS
TEMPERATURE: 98.5 F | WEIGHT: 210 LBS | RESPIRATION RATE: 14 BRPM | SYSTOLIC BLOOD PRESSURE: 143 MMHG | DIASTOLIC BLOOD PRESSURE: 81 MMHG | BODY MASS INDEX: 29.4 KG/M2 | HEART RATE: 65 BPM | OXYGEN SATURATION: 97 % | HEIGHT: 71 IN

## 2021-02-01 DIAGNOSIS — S82.832A OTHER CLOSED FRACTURE OF DISTAL END OF LEFT FIBULA, INITIAL ENCOUNTER: Primary | ICD-10-CM

## 2021-02-01 PROCEDURE — 99285 EMERGENCY DEPT VISIT HI MDM: CPT

## 2021-02-01 PROCEDURE — 73610 X-RAY EXAM OF ANKLE: CPT

## 2021-02-01 PROCEDURE — 6370000000 HC RX 637 (ALT 250 FOR IP): Performed by: STUDENT IN AN ORGANIZED HEALTH CARE EDUCATION/TRAINING PROGRAM

## 2021-02-01 PROCEDURE — 29515 APPLICATION SHORT LEG SPLINT: CPT

## 2021-02-01 RX ORDER — OXYCODONE HYDROCHLORIDE AND ACETAMINOPHEN 5; 325 MG/1; MG/1
1 TABLET ORAL EVERY 6 HOURS PRN
Qty: 12 TABLET | Refills: 0 | Status: ON HOLD | OUTPATIENT
Start: 2021-02-01 | End: 2021-02-03 | Stop reason: HOSPADM

## 2021-02-01 RX ORDER — OXYCODONE HYDROCHLORIDE AND ACETAMINOPHEN 5; 325 MG/1; MG/1
1 TABLET ORAL ONCE
Status: COMPLETED | OUTPATIENT
Start: 2021-02-01 | End: 2021-02-01

## 2021-02-01 RX ADMIN — OXYCODONE HYDROCHLORIDE AND ACETAMINOPHEN 1 TABLET: 5; 325 TABLET ORAL at 08:48

## 2021-02-01 RX ADMIN — OXYCODONE HYDROCHLORIDE AND ACETAMINOPHEN 1 TABLET: 5; 325 TABLET ORAL at 08:54

## 2021-02-01 ASSESSMENT — PAIN DESCRIPTION - ORIENTATION
ORIENTATION: LEFT
ORIENTATION: LEFT

## 2021-02-01 ASSESSMENT — ENCOUNTER SYMPTOMS
BACK PAIN: 0
CHEST TIGHTNESS: 0
VOMITING: 0
SHORTNESS OF BREATH: 0
ABDOMINAL PAIN: 0
NAUSEA: 0

## 2021-02-01 ASSESSMENT — PAIN DESCRIPTION - LOCATION
LOCATION: ANKLE
LOCATION: ANKLE

## 2021-02-01 ASSESSMENT — PAIN SCALES - GENERAL
PAINLEVEL_OUTOF10: 5
PAINLEVEL_OUTOF10: 8

## 2021-02-01 ASSESSMENT — PAIN DESCRIPTION - PROGRESSION: CLINICAL_PROGRESSION: GRADUALLY IMPROVING

## 2021-02-01 ASSESSMENT — PAIN DESCRIPTION - PAIN TYPE: TYPE: ACUTE PAIN

## 2021-02-01 NOTE — ED NOTES
Witnessed wasted percocet medication due to RN dropped on floor. Dr. Jing Patricio notified and he will reorder med.      Cecelia Rangel RN  02/01/21 1015

## 2021-02-01 NOTE — ED PROVIDER NOTES
Patient is a 75-year-old male with no significant past medical history that presents emergency department for evaluation of a fall, ankle injury. Patient was at work shoveling snow when he took a long step causing a fall and twisted his left ankle. He felt immediate pain and a pop in the left ankle. Describes the pain as sharp throbbing sensation in the anterior and medial side of the ankle. Made worse with palpation or weightbearing. No pain medication was taken prior to arrival.  Denies previous injury to this ankle. He denies numbness or tingling of the extremity, weakness in the extremity. The history is provided by the patient. Review of Systems   Constitutional: Negative for chills and fever. Respiratory: Negative for chest tightness and shortness of breath. Cardiovascular: Negative for chest pain and palpitations. Gastrointestinal: Negative for abdominal pain, nausea and vomiting. Genitourinary: Negative for difficulty urinating and flank pain. Musculoskeletal: Positive for joint swelling. Negative for back pain, neck pain and neck stiffness. Skin: Negative for pallor, rash and wound. Neurological: Negative for dizziness, weakness, light-headedness and headaches. All other systems reviewed and are negative. Physical Exam  Vitals signs and nursing note reviewed. Constitutional:       General: He is not in acute distress. Appearance: Normal appearance. He is well-developed. He is not ill-appearing or diaphoretic. HENT:      Head: Normocephalic and atraumatic. Eyes:      Extraocular Movements: Extraocular movements intact. Pupils: Pupils are equal, round, and reactive to light. Neck:      Musculoskeletal: Normal range of motion and neck supple. No muscular tenderness. Cardiovascular:      Rate and Rhythm: Normal rate and regular rhythm. Pulses:           Dorsalis pedis pulses are 2+ on the right side and 2+ on the left side.         Posterior tibial pulses are 2+ on the right side and 2+ on the left side. Heart sounds: No murmur. Pulmonary:      Effort: Pulmonary effort is normal. No respiratory distress. Breath sounds: Normal breath sounds. No wheezing or rales. Abdominal:      General: Bowel sounds are normal.      Palpations: Abdomen is soft. Tenderness: There is no abdominal tenderness. There is no guarding or rebound. Musculoskeletal:         General: Swelling and tenderness present. No deformity. Right lower leg: No edema. Left lower leg: No edema. Comments: There is moderate swelling to the medial malleolus on the left ankle. Tenderness palpation both on the medial and lateral aspect of the ankle as well as pain with movement. Skin:     General: Skin is warm and dry. Neurological:      Mental Status: He is alert and oriented to person, place, and time. Sensory: No sensory deficit. Motor: No weakness. Procedures   PROCEDURE NOTE  2/1/21       Time: 9182    SPLINT  APPLICATION  Risks, benefits and alternatives (for applicable procedures below) described. Performed By: Emergency Pysician. Indication:  fracture of distal fibula . Procedure:   A short  left leg 3 sided splint was applied by me. The patient did tolerate the procedure well. MDM  Number of Diagnoses or Management Options  Other closed fracture of distal end of left fibula, initial encounter  Diagnosis management comments: Patient is a 10-year-old male presents emergency room for evaluation of fall, ankle injury. Left ankle appears swollen and is tender palpation. No obvious deformity noted. He is neurovascularly intact on arrival.  X-ray shows a comminuted distal fibular fracture. Patient was placed in a 3 sided short leg splint. He was given a dose of Percocet and discharged home with follow-up with orthopedics. Symptoms for return to the emergency room were discussed with patient. and discussed todays results, in addition to providing specific details for the plan of care and counseling regarding the diagnosis and prognosis. Their questions are answered at this time and they are agreeable with the plan. I discussed at length with them reasons for immediate return here for re evaluation. They will followup with their primary care physician and orthopedic physician by calling their office tomorrow. --------------------------------- ADDITIONAL PROVIDER NOTES ---------------------------------  At this time the patient is without objective evidence of an acute process requiring hospitalization or inpatient management. They have remained hemodynamically stable throughout their entire ED visit and are stable for discharge with outpatient follow-up. The plan has been discussed in detail and they are aware of the specific conditions for emergent return, as well as the importance of follow-up. Discharge Medication List as of 2/1/2021 10:25 AM      START taking these medications    Details   oxyCODONE-acetaminophen (PERCOCET) 5-325 MG per tablet Take 1 tablet by mouth every 6 hours as needed for Pain for up to 3 days. Intended supply: 3 days. Take lowest dose possible to manage pain, Disp-12 tablet, R-0Print             Diagnosis:  1. Other closed fracture of distal end of left fibula, initial encounter        Disposition:  Patient's disposition: Discharge to home  Patient's condition is stable.         Narciso Liz., DO  Resident  02/01/21 7407

## 2021-02-02 ENCOUNTER — ANESTHESIA EVENT (OUTPATIENT)
Dept: OPERATING ROOM | Age: 41
End: 2021-02-02
Payer: COMMERCIAL

## 2021-02-02 NOTE — PROGRESS NOTES
[facility] PRE-ADMISSION TESTING GENERAL INSTRUCTIONS- [facility]-phone number:[phone]    GENERAL INSTRUCTIONS  [x] Antibacterial Soap shower Night before and/or AM of Surgery  [] Bharathi wipe instruction sheet and wipes given. [x] Nothing by mouth after midnight, including gum, candy, mints, or water. [x] You may brush your teeth, gargle, but do NOT swallow water. []Hibiclens shower  the night before and the morning of surgery. Do not use             Hibiclens on your face or head. []No smoking, chewing tobacco, illegal drugs, or alcohol within 24 hours of your surgery. [x] Jewelry, valuables or body piercing's should not be brought to the hospital. All body and/or tongue piercing's must be removed prior to arriving to hospital.  ALL hair pins must be removed. [x] Do not wear makeup, lotions, powders, deodorant. Nail polish as directed by the nurse. [x] Arrange transportation with a responsible adult  to and from the hospital. If you do not have a responsible adult  to transport you, you will need to make arrangements with a medical transportation company (i.e. [employer]. A Uber/taxi/bus is not appropriate unless you are accompanied by a responsible adult ). Arrange for someone to be with you for the remainder of the day and for 24 hours after your procedure due to having had anesthesia. Who will be your  for transportation?_wife_________________   Who will be staying with you for 24 hrs after your procedure? ___wife_______________  [x] Bring insurance card and photo ID.  [] Transfusion Bracelet: Please bring with you to hospital, day of surgery  [] Bring urine specimen day of surgery. Any small container is acceptable. [] Use inhalers the morning of surgery and bring with you to hospital.  [] Bring copy of living will or healthcare power of  papers to be placed in your electronic record.   [] CPAP/BI-PAP: Please bring your machine if you are to spend the night in the hospital.     PARKING INSTRUCTIONS:   [x] Arrival Time: 0500, main entrance on [facility]  Wear a mask. One person is permitted to come with you           [x] [facility], upon entering the hospital, take elevator B to the 3rd floor. Check in with .     EDUCATION INSTRUCTIONS:      [] Knee or hip replacement booklet & exercise pamphlets given. [] Salomon 75 placed in chart. [] Pre-admission Testing educational folder given  [] Incentive Spirometry,coughing & deep breathing exercises reviewed. []Medication information sheet(s)   []Fluoroscopy-Xray used in surgery reviewed with patient. Educational pamphlet placed in chart. [x]Pain: Post-op pain is normal and to be expected. You will be asked to rate your pain from 0-10(a zero is not acceptable-education is needed). Your post-op pain goal is:5  [x] Ask your nurse for your pain medication. [] Joint camp offered. [] Joint replacement booklets given. [x] Other:_loose comfortable clothing __________________________    MEDICATION INSTRUCTIONS:   [x]Bring a complete list of your medications, please write the last time you took the medicine, give this list to the nurse. [x] Take the following medications the morning of surgery with 1-2 ounces of water: see MAR  [] Stop herbal supplements and vitamins 5 days before your surgery. [] DO NOT take any diabetic medicine the morning of surgery. Follow instructions for insulin the day before surgery. [] If you are diabetic and your blood sugar is low or you feel symptomatic, you may drink 1-2 ounces of apple juice or take a glucose tablet. The morning of your procedure, you may call the pre-op area if you have concerns about your blood sugar [phone]. [] Use your inhalers the morning of surgery. Bring your emergency inhaler with you day of surgery.    [] Follow physician instructions regarding any blood thinners you may be taking. WHAT TO EXPECT:  [x] The day of surgery you will be greeted and checked in by the [employer].  In addition, you will be registered in the [facility] by a Patient Access Representative. Please bring your photo ID and insurance card. A nurse will greet you in accordance to the time you are needed in the pre-op area to prepare you for surgery. Please do not be discouraged if you are not greeted in the order you arrive as there are many variables that are involved in patient preparation. Your patience is greatly appreciated as you wait for your nurse. [x]  Delays may occur with surgery and staff will make a sincere effort to keep you informed of delays. If any delays occur with your procedure, we apologize ahead of time for your inconvenience as we recognize the value of your time.

## 2021-02-03 ENCOUNTER — HOSPITAL ENCOUNTER (OUTPATIENT)
Age: 41
Setting detail: OUTPATIENT SURGERY
Discharge: HOME | End: 2021-02-03
Attending: ORTHOPAEDIC SURGERY | Admitting: ORTHOPAEDIC SURGERY
Payer: COMMERCIAL

## 2021-02-03 ENCOUNTER — APPOINTMENT (OUTPATIENT)
Dept: GENERAL RADIOLOGY | Age: 41
End: 2021-02-03
Attending: ORTHOPAEDIC SURGERY
Payer: COMMERCIAL

## 2021-02-03 ENCOUNTER — ANESTHESIA (OUTPATIENT)
Dept: OPERATING ROOM | Age: 41
End: 2021-02-03
Payer: COMMERCIAL

## 2021-02-03 VITALS — OXYGEN SATURATION: 99 % | SYSTOLIC BLOOD PRESSURE: 141 MMHG | TEMPERATURE: 96.4 F | DIASTOLIC BLOOD PRESSURE: 78 MMHG

## 2021-02-03 VITALS
SYSTOLIC BLOOD PRESSURE: 120 MMHG | RESPIRATION RATE: 18 BRPM | OXYGEN SATURATION: 99 % | HEART RATE: 71 BPM | TEMPERATURE: 97.5 F | WEIGHT: 210 LBS | DIASTOLIC BLOOD PRESSURE: 75 MMHG | BODY MASS INDEX: 29.4 KG/M2 | HEIGHT: 71 IN

## 2021-02-03 DIAGNOSIS — Z01.818 PREOP TESTING: ICD-10-CM

## 2021-02-03 DIAGNOSIS — G89.18 POST-OP PAIN: ICD-10-CM

## 2021-02-03 DIAGNOSIS — M25.572 ACUTE LEFT ANKLE PAIN: ICD-10-CM

## 2021-02-03 DIAGNOSIS — S82.62XA CLOSED DISPLACED FRACTURE OF LATERAL MALLEOLUS OF LEFT FIBULA, INITIAL ENCOUNTER: Primary | ICD-10-CM

## 2021-02-03 LAB
ALBUMIN SERPL-MCNC: 4.1 G/DL (ref 3.5–5.2)
ALP BLD-CCNC: 57 U/L (ref 40–129)
ALT SERPL-CCNC: 25 U/L (ref 0–40)
ANION GAP SERPL CALCULATED.3IONS-SCNC: 8 MMOL/L (ref 7–16)
AST SERPL-CCNC: 17 U/L (ref 0–39)
BILIRUB SERPL-MCNC: 0.3 MG/DL (ref 0–1.2)
BUN BLDV-MCNC: 9 MG/DL (ref 6–20)
CALCIUM SERPL-MCNC: 8.9 MG/DL (ref 8.6–10.2)
CHLORIDE BLD-SCNC: 104 MMOL/L (ref 98–107)
CO2: 27 MMOL/L (ref 22–29)
CREAT SERPL-MCNC: 1 MG/DL (ref 0.7–1.2)
GFR AFRICAN AMERICAN: >60
GFR NON-AFRICAN AMERICAN: >60 ML/MIN/1.73
GLUCOSE BLD-MCNC: 102 MG/DL (ref 74–99)
HCT VFR BLD CALC: 41.8 % (ref 37–54)
HEMOGLOBIN: 13.7 G/DL (ref 12.5–16.5)
MCH RBC QN AUTO: 28.6 PG (ref 26–35)
MCHC RBC AUTO-ENTMCNC: 32.8 % (ref 32–34.5)
MCV RBC AUTO: 87.3 FL (ref 80–99.9)
PDW BLD-RTO: 14.5 FL (ref 11.5–15)
PLATELET # BLD: 210 E9/L (ref 130–450)
PMV BLD AUTO: 10.7 FL (ref 7–12)
POTASSIUM REFLEX MAGNESIUM: 3.6 MMOL/L (ref 3.5–5)
RBC # BLD: 4.79 E12/L (ref 3.8–5.8)
SARS-COV-2, NAAT: NOT DETECTED
SODIUM BLD-SCNC: 139 MMOL/L (ref 132–146)
TOTAL PROTEIN: 6.9 G/DL (ref 6.4–8.3)
WBC # BLD: 7.1 E9/L (ref 4.5–11.5)

## 2021-02-03 PROCEDURE — 64447 NJX AA&/STRD FEMORAL NRV IMG: CPT | Performed by: ANESTHESIOLOGY

## 2021-02-03 PROCEDURE — C1713 ANCHOR/SCREW BN/BN,TIS/BN: HCPCS | Performed by: ORTHOPAEDIC SURGERY

## 2021-02-03 PROCEDURE — 6360000002 HC RX W HCPCS

## 2021-02-03 PROCEDURE — 2720000010 HC SURG SUPPLY STERILE: Performed by: ORTHOPAEDIC SURGERY

## 2021-02-03 PROCEDURE — 27829 TREAT LOWER LEG JOINT: CPT | Performed by: ORTHOPAEDIC SURGERY

## 2021-02-03 PROCEDURE — 64445 NJX AA&/STRD SCIATIC NRV IMG: CPT | Performed by: ANESTHESIOLOGY

## 2021-02-03 PROCEDURE — 6360000002 HC RX W HCPCS: Performed by: ANESTHESIOLOGY

## 2021-02-03 PROCEDURE — 3209999900 FLUORO FOR SURGICAL PROCEDURES

## 2021-02-03 PROCEDURE — 3600000015 HC SURGERY LEVEL 5 ADDTL 15MIN: Performed by: ORTHOPAEDIC SURGERY

## 2021-02-03 PROCEDURE — U0002 COVID-19 LAB TEST NON-CDC: HCPCS

## 2021-02-03 PROCEDURE — 2709999900 HC NON-CHARGEABLE SUPPLY: Performed by: ORTHOPAEDIC SURGERY

## 2021-02-03 PROCEDURE — 7100000010 HC PHASE II RECOVERY - FIRST 15 MIN: Performed by: ORTHOPAEDIC SURGERY

## 2021-02-03 PROCEDURE — 7100000001 HC PACU RECOVERY - ADDTL 15 MIN: Performed by: ORTHOPAEDIC SURGERY

## 2021-02-03 PROCEDURE — 6360000002 HC RX W HCPCS: Performed by: ORTHOPAEDIC SURGERY

## 2021-02-03 PROCEDURE — 2580000003 HC RX 258

## 2021-02-03 PROCEDURE — 27792 TREATMENT OF ANKLE FRACTURE: CPT | Performed by: ORTHOPAEDIC SURGERY

## 2021-02-03 PROCEDURE — 3700000000 HC ANESTHESIA ATTENDED CARE: Performed by: ORTHOPAEDIC SURGERY

## 2021-02-03 PROCEDURE — 80053 COMPREHEN METABOLIC PANEL: CPT

## 2021-02-03 PROCEDURE — 2500000003 HC RX 250 WO HCPCS

## 2021-02-03 PROCEDURE — 73610 X-RAY EXAM OF ANKLE: CPT

## 2021-02-03 PROCEDURE — 7100000000 HC PACU RECOVERY - FIRST 15 MIN: Performed by: ORTHOPAEDIC SURGERY

## 2021-02-03 PROCEDURE — 85027 COMPLETE CBC AUTOMATED: CPT

## 2021-02-03 PROCEDURE — 99235 HOSP IP/OBS SAME DATE MOD 70: CPT | Performed by: ORTHOPAEDIC SURGERY

## 2021-02-03 PROCEDURE — 3700000001 HC ADD 15 MINUTES (ANESTHESIA): Performed by: ORTHOPAEDIC SURGERY

## 2021-02-03 PROCEDURE — 3600000005 HC SURGERY LEVEL 5 BASE: Performed by: ORTHOPAEDIC SURGERY

## 2021-02-03 PROCEDURE — 36415 COLL VENOUS BLD VENIPUNCTURE: CPT

## 2021-02-03 PROCEDURE — 7100000011 HC PHASE II RECOVERY - ADDTL 15 MIN: Performed by: ORTHOPAEDIC SURGERY

## 2021-02-03 DEVICE — PLATE BNE L117MM 10 H S STL 1/3 TBLR LOK COMPR W/ CLLR FOR: Type: IMPLANTABLE DEVICE | Site: ANKLE | Status: FUNCTIONAL

## 2021-02-03 DEVICE — SCREW BNE L14MM DIA3.5MM CORT S STL ST NONCANNULATED LOK: Type: IMPLANTABLE DEVICE | Site: ANKLE | Status: FUNCTIONAL

## 2021-02-03 DEVICE — SCREW BNE L16MM DIA4MM CANC S STL ST CANN NONLOCKING FULL: Type: IMPLANTABLE DEVICE | Site: ANKLE | Status: FUNCTIONAL

## 2021-02-03 DEVICE — SCREW BNE L12MM DIA3.5MM CORT S STL ST NONCANNULATED LOK: Type: IMPLANTABLE DEVICE | Site: ANKLE | Status: FUNCTIONAL

## 2021-02-03 DEVICE — SCREW BNE L16MM DIA3.5MM CORT S STL ST NONCANNULATED LOK: Type: IMPLANTABLE DEVICE | Site: ANKLE | Status: FUNCTIONAL

## 2021-02-03 RX ORDER — MORPHINE SULFATE 2 MG/ML
2 INJECTION, SOLUTION INTRAMUSCULAR; INTRAVENOUS EVERY 5 MIN PRN
Status: DISCONTINUED | OUTPATIENT
Start: 2021-02-03 | End: 2021-02-03 | Stop reason: HOSPADM

## 2021-02-03 RX ORDER — ROPIVACAINE HYDROCHLORIDE 5 MG/ML
INJECTION, SOLUTION EPIDURAL; INFILTRATION; PERINEURAL
Status: COMPLETED | OUTPATIENT
Start: 2021-02-03 | End: 2021-02-03

## 2021-02-03 RX ORDER — EPHEDRINE SULFATE/0.9% NACL/PF 50 MG/5 ML
SYRINGE (ML) INTRAVENOUS PRN
Status: DISCONTINUED | OUTPATIENT
Start: 2021-02-03 | End: 2021-02-03 | Stop reason: SDUPTHER

## 2021-02-03 RX ORDER — LIDOCAINE HYDROCHLORIDE 20 MG/ML
INJECTION, SOLUTION INTRAVENOUS PRN
Status: DISCONTINUED | OUTPATIENT
Start: 2021-02-03 | End: 2021-02-03 | Stop reason: SDUPTHER

## 2021-02-03 RX ORDER — ROCURONIUM BROMIDE 10 MG/ML
INJECTION, SOLUTION INTRAVENOUS PRN
Status: DISCONTINUED | OUTPATIENT
Start: 2021-02-03 | End: 2021-02-03 | Stop reason: SDUPTHER

## 2021-02-03 RX ORDER — OXYCODONE HYDROCHLORIDE AND ACETAMINOPHEN 5; 325 MG/1; MG/1
2 TABLET ORAL PRN
Status: DISCONTINUED | OUTPATIENT
Start: 2021-02-03 | End: 2021-02-03 | Stop reason: HOSPADM

## 2021-02-03 RX ORDER — FENTANYL CITRATE 50 UG/ML
INJECTION, SOLUTION INTRAMUSCULAR; INTRAVENOUS PRN
Status: DISCONTINUED | OUTPATIENT
Start: 2021-02-03 | End: 2021-02-03 | Stop reason: SDUPTHER

## 2021-02-03 RX ORDER — NEOSTIGMINE METHYLSULFATE 1 MG/ML
INJECTION, SOLUTION INTRAVENOUS PRN
Status: DISCONTINUED | OUTPATIENT
Start: 2021-02-03 | End: 2021-02-03 | Stop reason: SDUPTHER

## 2021-02-03 RX ORDER — ASPIRIN 81 MG/1
81 TABLET ORAL 2 TIMES DAILY
Qty: 56 TABLET | Refills: 0 | Status: SHIPPED | OUTPATIENT
Start: 2021-02-03 | End: 2021-03-15

## 2021-02-03 RX ORDER — MEPERIDINE HYDROCHLORIDE 25 MG/ML
12.5 INJECTION INTRAMUSCULAR; INTRAVENOUS; SUBCUTANEOUS
Status: DISCONTINUED | OUTPATIENT
Start: 2021-02-03 | End: 2021-02-03 | Stop reason: HOSPADM

## 2021-02-03 RX ORDER — SODIUM CHLORIDE 0.9 % (FLUSH) 0.9 %
10 SYRINGE (ML) INJECTION EVERY 12 HOURS SCHEDULED
Status: DISCONTINUED | OUTPATIENT
Start: 2021-02-03 | End: 2021-02-03 | Stop reason: HOSPADM

## 2021-02-03 RX ORDER — ROPIVACAINE HYDROCHLORIDE 5 MG/ML
60 INJECTION, SOLUTION EPIDURAL; INFILTRATION; PERINEURAL ONCE
Status: COMPLETED | OUTPATIENT
Start: 2021-02-03 | End: 2021-02-03

## 2021-02-03 RX ORDER — SODIUM CHLORIDE 9 MG/ML
INJECTION, SOLUTION INTRAVENOUS CONTINUOUS PRN
Status: DISCONTINUED | OUTPATIENT
Start: 2021-02-03 | End: 2021-02-03 | Stop reason: SDUPTHER

## 2021-02-03 RX ORDER — ONDANSETRON 2 MG/ML
INJECTION INTRAMUSCULAR; INTRAVENOUS PRN
Status: DISCONTINUED | OUTPATIENT
Start: 2021-02-03 | End: 2021-02-03 | Stop reason: SDUPTHER

## 2021-02-03 RX ORDER — MIDAZOLAM HYDROCHLORIDE 2 MG/2ML
1 INJECTION, SOLUTION INTRAMUSCULAR; INTRAVENOUS PRN
Status: DISCONTINUED | OUTPATIENT
Start: 2021-02-03 | End: 2021-02-03 | Stop reason: HOSPADM

## 2021-02-03 RX ORDER — OXYCODONE HYDROCHLORIDE AND ACETAMINOPHEN 5; 325 MG/1; MG/1
1 TABLET ORAL PRN
Status: DISCONTINUED | OUTPATIENT
Start: 2021-02-03 | End: 2021-02-03 | Stop reason: HOSPADM

## 2021-02-03 RX ORDER — GLYCOPYRROLATE 1 MG/5 ML
SYRINGE (ML) INTRAVENOUS PRN
Status: DISCONTINUED | OUTPATIENT
Start: 2021-02-03 | End: 2021-02-03 | Stop reason: SDUPTHER

## 2021-02-03 RX ORDER — PROPOFOL 10 MG/ML
INJECTION, EMULSION INTRAVENOUS PRN
Status: DISCONTINUED | OUTPATIENT
Start: 2021-02-03 | End: 2021-02-03 | Stop reason: SDUPTHER

## 2021-02-03 RX ORDER — ONDANSETRON 2 MG/ML
4 INJECTION INTRAMUSCULAR; INTRAVENOUS
Status: DISCONTINUED | OUTPATIENT
Start: 2021-02-03 | End: 2021-02-03 | Stop reason: HOSPADM

## 2021-02-03 RX ORDER — SODIUM CHLORIDE 0.9 % (FLUSH) 0.9 %
10 SYRINGE (ML) INJECTION PRN
Status: DISCONTINUED | OUTPATIENT
Start: 2021-02-03 | End: 2021-02-03 | Stop reason: HOSPADM

## 2021-02-03 RX ORDER — MORPHINE SULFATE 2 MG/ML
1 INJECTION, SOLUTION INTRAMUSCULAR; INTRAVENOUS EVERY 5 MIN PRN
Status: DISCONTINUED | OUTPATIENT
Start: 2021-02-03 | End: 2021-02-03 | Stop reason: HOSPADM

## 2021-02-03 RX ORDER — MIDAZOLAM HYDROCHLORIDE 1 MG/ML
INJECTION INTRAMUSCULAR; INTRAVENOUS PRN
Status: DISCONTINUED | OUTPATIENT
Start: 2021-02-03 | End: 2021-02-03 | Stop reason: SDUPTHER

## 2021-02-03 RX ORDER — OXYCODONE HYDROCHLORIDE AND ACETAMINOPHEN 5; 325 MG/1; MG/1
1 TABLET ORAL EVERY 6 HOURS PRN
Qty: 28 TABLET | Refills: 0 | Status: SHIPPED | OUTPATIENT
Start: 2021-02-03 | End: 2021-02-10

## 2021-02-03 RX ORDER — PHENYLEPHRINE HCL IN 0.9% NACL 1 MG/10 ML
SYRINGE (ML) INTRAVENOUS PRN
Status: DISCONTINUED | OUTPATIENT
Start: 2021-02-03 | End: 2021-02-03 | Stop reason: SDUPTHER

## 2021-02-03 RX ADMIN — FENTANYL CITRATE 100 MCG: 50 INJECTION, SOLUTION INTRAMUSCULAR; INTRAVENOUS at 07:26

## 2021-02-03 RX ADMIN — FENTANYL CITRATE 50 MCG: 50 INJECTION, SOLUTION INTRAMUSCULAR; INTRAVENOUS at 07:58

## 2021-02-03 RX ADMIN — Medication 0.2 MG: at 07:47

## 2021-02-03 RX ADMIN — Medication 100 MCG: at 08:06

## 2021-02-03 RX ADMIN — LIDOCAINE HYDROCHLORIDE 100 MG: 20 INJECTION, SOLUTION INTRAVENOUS at 07:26

## 2021-02-03 RX ADMIN — Medication 100 MCG: at 08:12

## 2021-02-03 RX ADMIN — Medication 100 MCG: at 07:47

## 2021-02-03 RX ADMIN — Medication 3 MG: at 08:46

## 2021-02-03 RX ADMIN — SODIUM CHLORIDE: 9 INJECTION, SOLUTION INTRAVENOUS at 07:42

## 2021-02-03 RX ADMIN — Medication 100 MCG: at 08:31

## 2021-02-03 RX ADMIN — Medication 2000 MG: at 07:31

## 2021-02-03 RX ADMIN — SODIUM CHLORIDE: 9 INJECTION, SOLUTION INTRAVENOUS at 07:07

## 2021-02-03 RX ADMIN — FENTANYL CITRATE 50 MCG: 50 INJECTION, SOLUTION INTRAMUSCULAR; INTRAVENOUS at 08:59

## 2021-02-03 RX ADMIN — ROPIVACAINE HYDROCHLORIDE 30 ML: 5 INJECTION EPIDURAL; INFILTRATION; PERINEURAL at 07:16

## 2021-02-03 RX ADMIN — PROPOFOL 200 MG: 10 INJECTION, EMULSION INTRAVENOUS at 07:26

## 2021-02-03 RX ADMIN — ROPIVACAINE HYDROCHLORIDE 30 ML: 5 INJECTION EPIDURAL; INFILTRATION; PERINEURAL at 07:15

## 2021-02-03 RX ADMIN — ROPIVACAINE HYDROCHLORIDE 60 ML: 5 INJECTION EPIDURAL; INFILTRATION; PERINEURAL at 07:06

## 2021-02-03 RX ADMIN — ONDANSETRON HYDROCHLORIDE 4 MG: 2 INJECTION, SOLUTION INTRAMUSCULAR; INTRAVENOUS at 08:40

## 2021-02-03 RX ADMIN — Medication 0.6 MG: at 08:46

## 2021-02-03 RX ADMIN — Medication 100 MCG: at 08:40

## 2021-02-03 RX ADMIN — Medication 100 MCG: at 07:50

## 2021-02-03 RX ADMIN — Medication 5 MG: at 07:52

## 2021-02-03 RX ADMIN — Medication 0.2 MG: at 07:50

## 2021-02-03 RX ADMIN — Medication 100 MCG: at 08:19

## 2021-02-03 RX ADMIN — Medication 100 MCG: at 08:26

## 2021-02-03 RX ADMIN — ROCURONIUM BROMIDE 40 MG: 10 INJECTION, SOLUTION INTRAVENOUS at 07:26

## 2021-02-03 RX ADMIN — MIDAZOLAM 2 MG: 1 INJECTION INTRAMUSCULAR; INTRAVENOUS at 07:10

## 2021-02-03 ASSESSMENT — PULMONARY FUNCTION TESTS
PIF_VALUE: 25
PIF_VALUE: 22
PIF_VALUE: 0
PIF_VALUE: 24
PIF_VALUE: 23
PIF_VALUE: 25
PIF_VALUE: 3
PIF_VALUE: 23
PIF_VALUE: 24
PIF_VALUE: 25
PIF_VALUE: 24
PIF_VALUE: 22
PIF_VALUE: 25
PIF_VALUE: 19
PIF_VALUE: 24
PIF_VALUE: 39
PIF_VALUE: 24
PIF_VALUE: 25
PIF_VALUE: 12
PIF_VALUE: 23
PIF_VALUE: 17
PIF_VALUE: 12
PIF_VALUE: 24
PIF_VALUE: 26
PIF_VALUE: 25
PIF_VALUE: 0
PIF_VALUE: 25
PIF_VALUE: 25
PIF_VALUE: 24
PIF_VALUE: 1
PIF_VALUE: 21
PIF_VALUE: 25
PIF_VALUE: 24
PIF_VALUE: 12
PIF_VALUE: 25
PIF_VALUE: 1
PIF_VALUE: 24
PIF_VALUE: 23
PIF_VALUE: 25
PIF_VALUE: 23
PIF_VALUE: 23
PIF_VALUE: 24
PIF_VALUE: 25
PIF_VALUE: 17
PIF_VALUE: 25
PIF_VALUE: 25
PIF_VALUE: 1
PIF_VALUE: 23
PIF_VALUE: 25
PIF_VALUE: 22
PIF_VALUE: 25
PIF_VALUE: 26
PIF_VALUE: 25
PIF_VALUE: 30
PIF_VALUE: 1
PIF_VALUE: 25
PIF_VALUE: 25
PIF_VALUE: 22
PIF_VALUE: 25
PIF_VALUE: 25

## 2021-02-03 ASSESSMENT — PAIN SCALES - GENERAL
PAINLEVEL_OUTOF10: 0
PAINLEVEL_OUTOF10: 0

## 2021-02-03 ASSESSMENT — LIFESTYLE VARIABLES: SMOKING_STATUS: 0

## 2021-02-03 ASSESSMENT — PAIN DESCRIPTION - DESCRIPTORS: DESCRIPTORS: ACHING;CONSTANT

## 2021-02-03 NOTE — ANESTHESIA PRE PROCEDURE
Department of Anesthesiology  Preprocedure Note       Name:  Jorge Lucas   Age:  41 y.o.  :  1980                                          MRN:  [id number]         Date:  2/3/2021      Surgeon: Isaac Manzanares):  Cyrus Balderas MD    Procedure: Procedure(s):  LEFT ANKLE  OPEN REDUCTION INTERNAL FIXATION--SMALL SYNTHES    Medications prior to admission:   Prior to Admission medications    Medication Sig Start Date End Date Taking? Authorizing Provider   Escitalopram Oxalate (LEXAPRO PO) Take 15 mg by mouth every evening    Yes Historical Provider, MD   oxyCODONE-acetaminophen (PERCOCET) 5-325 MG per tablet Take 1 tablet by mouth every 6 hours as needed for Pain for up to 3 days. Intended supply: 3 days.  Take lowest dose possible to manage pain 21 Yes Wing Mauro DO   CREON 91308 units delayed release capsule TAKE 3 CAPSULES BY MOUTH 3 TIMES A DAY WITH MEALS  Patient taking differently: Take 2 capsules by mouth 3 times daily (with meals)  20  Yes Mick Tabor III, MD   pantoprazole (PROTONIX) 40 MG tablet Take 40 mg by mouth daily    Yes Historical Provider, MD   acetaminophen (APAP EXTRA STRENGTH) 500 MG tablet Take 2 tablets by mouth every 6 hours for 14 days  Patient not taking: Reported on 2019  Mick Tabor III, MD       Current medications:    Current Facility-Administered Medications   Medication Dose Route Frequency Provider Last Rate Last Admin    sodium chloride flush 0.9 % injection 10 mL  10 mL Intravenous 2 times per day Cyrus Balderas MD        sodium chloride flush 0.9 % injection 10 mL  10 mL Intravenous PRN Cyrus Balderas MD        ceFAZolin (ANCEF) 2000 mg in sterile water 20 mL IV syringe  2,000 mg Intravenous On Call to [address] [facility]        meperidine (DEMEROL) injection 12.5 mg  12.5 mg Intravenous Q15 Min PRN Toñito Sabillon DO        HYDROmorphone (DILAUDID) injection 0.25 mg  0.25 mg Intravenous Q5 Min PRN Peggi Roe, DO        HYDROmorphone (DILAUDID) injection 0.5 mg  0.5 mg Intravenous Q5 Min PRN Peggi Roe, DO        morphine (PF) injection 1 mg  1 mg Intravenous Q5 Min PRN Peggi Roe, DO        morphine (PF) injection 2 mg  2 mg Intravenous Q5 Min PRN Peggi Roe, DO        oxyCODONE-acetaminophen (PERCOCET) 5-325 MG per tablet 1 tablet  1 tablet Oral PRN Peggi Roe, DO        Or    oxyCODONE-acetaminophen (PERCOCET) 5-325 MG per tablet 2 tablet  2 tablet Oral PRN Peggi Roe, DO        ondansetron [facility]) injection 4 mg  4 mg Intravenous Once PRN Peggi Roe, DO           Allergies:  No Known Allergies    Problem List:    Patient Active Problem List   Diagnosis Code    GERD (gastroesophageal reflux disease) K21.9    Irritable bowel syndrome with diarrhea K58.0    Migraines G43.909    Pulmonary nodule RUL R91.1    Former smoker Z87.891    IBS (irritable bowel syndrome) K58.9    Hyperlipidemia E78.5    Choledocholithiasis K80.50    Borderline abnormal TFTs R94.6    Gall stones, common bile duct K80.50    Calculus bile duct w/obstruction and w/o cholangitis or cholecystitis K80.51    Post-ERCP acute pancreatitis K91.89, K85.90    SIRS (systemic inflammatory response syndrome) (HCC) R65.10    Pancreatic ascites R18.8    Pancreatic pseudocyst K86.3    Gallstone pancreatitis K85.10       Past Medical History:        Diagnosis Date    Anemia 11/2014    GERD (gastroesophageal reflux disease)     For the last 15+ yrs as of 2017.  History of blood transfusion 12/2017    anemia    Hyperlipidemia     IBS (irritable bowel syndrome)     with long-pattern of ~4 loose BMs per day, increased to 5-6/day in the latter half of 2017    DAJUAN on CPAP        Past Surgical History:        Procedure Laterality Date    ANKLE SURGERY Right 09/09/2014    ORIF right ankle fracture CINDY Moran MD    CHOLECYSTECTOMY, LAPAROSCOPIC N/A 2019    CHOLECYSTECTOMY LAPAROSCOPIC ROBOTIC ASSISTED POSS OPEN POSS CHOLANGIOGRAM performed by Nehemias Phoenix MD at [facility] [facility]ON[facility]  2017    Done at [address] [place] [address]. Negative per pt.  ERCP N/A 2019    ERCP STONE REMOVAL performed by Elmer Villalta MD at [address]  2017    Done at [address] [place] [address]. Negative except for possibly a couple of spots of gastritis per pt - biopsied, results pending. Social History:    Social History     Tobacco Use    Smoking status: Former Smoker     Packs/day: 1.00     Years: 15.00     Pack years: 15.00     Types: Cigarettes     Quit date:      Years since quittin.0    Smokeless tobacco: Never Used   Substance Use Topics    Alcohol use: Not Currently     Alcohol/week: 3.0 standard drinks     Types: 3 Shots of liquor per week                                Counseling given: Not Answered      Vital Signs (Current):   Vitals:    21 0921 21 0545   BP:  (!) 155/81   Pulse:  91   Resp:  19   Temp:  36.4 °C (97.5 °F)   TempSrc:  Temporal   SpO2:  94%   Weight: 210 lb (95.3 kg) 210 lb (95.3 kg)   Height: 5' 11\" (1.803 m) 5' 11\" (1.803 m)                                              BP Readings from Last 3 Encounters:   21 (!) 155/81   21 (!) 143/81   20 (!) 193/90       NPO Status: Time of last liquid consumption: 1800                        Time of last solid consumption: 1800                        Date of last liquid consumption: 21                        Date of last solid food consumption: 21    BMI:   Wt Readings from Last 3 Encounters:   21 210 lb (95.3 kg)   21 210 lb (95.3 kg)   20 220 lb (99.8 kg)     Body mass index is 29.29 kg/m².     CBC:   Lab Results   Component Value Date    WBC 7.1 2021    RBC 4.79 2021    HGB 13.7 2021    HCT 41.8 2021    MCV 87.3 2021    RDW 14.5 02/03/2021     02/03/2021       CMP:   Lab Results   Component Value Date     12/07/2020    K 3.7 12/07/2020    K 3.5 10/09/2019     12/07/2020    CO2 25 12/07/2020    BUN 10 12/07/2020    CREATININE 1.0 12/07/2020    GFRAA >60 12/07/2020    LABGLOM >60 12/07/2020    GLUCOSE 118 12/07/2020    PROT 6.8 12/07/2020    CALCIUM 8.8 12/07/2020    BILITOT 0.4 12/07/2020    ALKPHOS 61 12/07/2020    AST 22 12/07/2020    ALT 22 12/07/2020       POC Tests: No results for input(s): POCGLU, POCNA, POCK, POCCL, POCBUN, POCHEMO, POCHCT in the last 72 hours. Coags:   Lab Results   Component Value Date    PROTIME 14.1 10/01/2019    INR 1.2 10/01/2019       HCG (If Applicable): No results found for: PREGTESTUR, PREGSERUM, HCG, HCGQUANT     ABGs: No results found for: PHART, PO2ART, AAF0BWY, OIX3DES, BEART, S8JFWNPK     Type & Screen (If Applicable):  No results found for: LABABO, LABRH    Drug/Infectious Status (If Applicable):  No results found for: HIV, HEPCAB    COVID-19 Screening (If Applicable):   Lab Results   Component Value Date    COVID19 Not Detected 02/03/2021    COVID19 Not Detected 12/07/2020     CXR 12/7/2020  FINDINGS:   The lungs are without acute focal process.  There is no effusion or   pneumothorax. The cardiomediastinal silhouette is without acute process. The   osseous structures are without acute process.       Impression   No acute process.      EKG 12/7/2020  Ventricular Rate 59  BPM Final 12/07/2020  4:23 PM HMHPEAPM   Atrial Rate 59  BPM Final 12/07/2020  4:23 PM [facility]HPEAPM   P-R Interval 112  ms Final 12/07/2020  4:23 PM [facility]APM   QRS Duration 90  ms Final 12/07/2020  4:23 PM [facility]PEAPM   Q-T Interval 406  ms Final 12/07/2020  4:23 PM [facility]APM   QTc Calculation (Bazett) 401  ms Final 12/07/2020  4:23 PM [facility]PEAPM   P [place] 12  degrees Final 12/07/2020  4:23 PM [facility]PEAPM   R [place] 24  degrees Final 12/07/2020  4:23 PM [facility]PEAPM   T [place] 14  degrees Final 12/07/2020  4:23 PM HMHPEAPM   Testing Performed By    [address] Name Director Address Valid Date Range   360-[facility]M [facility] MUSE Unknown Unknown 04/18/16 0721-Present   Narrative & Impression    Sinus bradycardia  Otherwise normal ECG  When compared with ECG of 22-SEP-2019 17:07,  No significant change was found       Anesthesia Evaluation  Patient summary reviewed no history of anesthetic complications:   Airway: Mallampati: II  TM distance: >3 FB   Neck ROM: full  Mouth opening: > = 3 FB Dental: normal exam         Pulmonary: breath sounds clear to auscultation  (+) sleep apnea: on CPAP,      (-) not a current smoker (states second hand smoke exposure at work)                          ROS comment: Pulmonary nodule RUL   Cardiovascular:Negative CV ROS    (+) hyperlipidemia        Rhythm: regular  Rate: normal           Beta Blocker:  Not on Beta Blocker         Neuro/Psych:   (+) headaches (states related to gulf war syndrome): migraine headaches,             GI/Hepatic/Renal:   (+) GERD: well controlled,          ROS comment: Irritable bowel syndrome  Calculus bile duct w/obstruction and w/o cholangitis or cholecystitis. Endo/Other:    (+) blood dyscrasia (iron deficiency anemia): anemia:., .                 Abdominal:           Vascular: negative vascular ROS. Anesthesia Plan      general and regional     ASA 3     (20g L AC)  Induction: intravenous. MIPS: Postoperative opioids intended, Prophylactic antiemetics administered and Postoperative trial extubation. Anesthetic plan and risks discussed with patient. Use of blood products discussed with patient whom consented to blood products. Plan discussed with CRNA and attending.                   Juan Atkinson RN   2/3/2021

## 2021-02-03 NOTE — H&P
Kika Carpio DO   Resident   Orthopedic Surgery   H&P      Date of Service:  2/3/2021  6:16 AM                             Department of Orthopedic Surgery        HISTORY OF PRESENT ILLNESS:                 The patient is a 41 y.o. male , at work as a  1/31/2021. Was working at the station and slipped on ice twisted isolated complaint of left ankle pain. Went to [facility] emergency room had x-rays demonstrating distal fibular fracture with talar displacement and syndesmotic widening. No other complaints related to the incident. He was splinted. Here for surgical repair.     Past Medical History:      Patient has history of prior right ankle fracture repair by me , 2014. Patient states he is under work-up at the [place] for [address] [place] syndrome. Denies diabetes cardiac disease renal disease hypertension or pulmonary disease. He does have history of gallstone pancreatitis. Denies significant ethanol use. Does not smoke cigarettes. Past Medical History             Diagnosis Date    Anemia 11/2014    GERD (gastroesophageal reflux disease)       For the last 15+ yrs as of 2017.  History of blood transfusion 12/2017     anemia    Hyperlipidemia      IBS (irritable bowel syndrome)       with long-pattern of ~4 loose BMs per day, increased to 5-6/day in the latter half of 2017    DAJUAN on CPAP           Past Surgical History:    Past Surgical History             Procedure Laterality Date    ANKLE SURGERY Right 09/09/2014     ORIF right ankle fracture R. Keerthi Emerson MD    CHOLECYSTECTOMY, LAPAROSCOPIC N/A 11/14/2019     CHOLECYSTECTOMY LAPAROSCOPIC ROBOTIC ASSISTED POSS OPEN POSS CHOLANGIOGRAM performed by Torie Aguero MD at [place] [address] COLONOSCOPY   12/21/2017     Done at [facility]. Negative per pt.     ERCP N/A 9/27/2019     ERCP STONE REMOVAL performed by Lenin Doan MD at [address] [place] [address]   12/21/2017     Done at [facility]. Negative except for possibly a couple of spots of gastritis per pt - biopsied, results pending.         Current Medications:     Current Hospital Medications   Current Facility-Administered Medications: sodium chloride flush 0.9 % injection 10 mL, 10 mL, Intravenous, 2 times per day  sodium chloride flush 0.9 % injection 10 mL, 10 mL, Intravenous, PRN  ceFAZolin (ANCEF) 2000 mg in sterile water 20 mL IV syringe, 2,000 mg, Intravenous, On Call to OR     Allergies:  Patient has no known allergies.     Social History:   TOBACCO:   reports that he quit smoking about 6 years ago. His smoking use included cigarettes. He has a 15.00 pack-year smoking history. He has never used smokeless tobacco.  ETOH:   reports previous alcohol use of about 3.0 standard drinks of alcohol per week. DRUGS:   reports no history of drug use. ACTIVITIES OF DAILY LIVING:    OCCUPATION:    Family History:   Family History   History reviewed.  No pertinent family history.        REVIEW OF SYSTEMS:  CONSTITUTIONAL:  negative for  fevers, chills  EYES:  negative for blurred vision, visual disturbance  HEENT:  negative for  hearing loss, voice change  RESPIRATORY:  negative for  dyspnea, wheezing  CARDIOVASCULAR:  negative for  chest pain, palpitations  GASTROINTESTINAL:  negative for nausea, vomiting  GENITOURINARY:  negative for frequency, urinary incontinence  HEMATOLOGIC/LYMPHATIC:  negative for bleeding and petechiae  MUSCULOSKELETAL:  positive for  Left ankle pain  NEUROLOGICAL:  negative for headaches, dizziness  BEHAVIOR/PSYCH:  negative for increased agitation and anxiety     PHYSICAL EXAM:    VITALS:  BP (!) 155/81   Pulse 91   Temp 97.5 °F (36.4 °C) (Temporal)   Resp 19   Ht 5' 11\" (1.803 m)   Wt 210 lb (95.3 kg)   SpO2 94%   BMI 29.29 kg/m²   CONSTITUTIONAL:  Awake, alert, answers questions appropriately  EYES:  Lids and lashes normal, pupils equal, round and reactive to light, extra ocular muscles intact  HENT: Normocephalic, without obvious abnormality, atraumatic, neck supple, symmetric  LUNGS:  No increased work of breathing  CARDIOVASCULAR:  Brisk vascular capillary refill to all extremities  ABDOMEN:  Non-tender, Non-distended  NEUROLOGIC:  Awake, alert, oriented to name, place and time. Cranial nerves II-XII are grossly intact. MUSCULOSKELETAL:  Left lower Extremity:   · Left lower extremity splinted  · Compartments soft and compressible  · Patient wiggling toes  · Brisk Cap refill, Toes warm and perfused  · Distal sensation grossly intact to exposed aspect of foot              DATA:    CBC:         Lab Results   Component Value Date     WBC 7.4 12/07/2020     RBC 4.45 12/07/2020     HGB 12.9 12/07/2020     HCT 39.6 12/07/2020     MCV 89.0 12/07/2020     MCH 29.0 12/07/2020     MCHC 32.6 12/07/2020     RDW 15.6 12/07/2020      12/07/2020     MPV 10.2 12/07/2020      PT/INR:          Lab Results   Component Value Date     PROTIME 14.1 10/01/2019     INR 1.2 10/01/2019      Radiology Review:    · XR left ankle: Foley C distal fibula fracture with minimal displacement. Widening of the medial joint space noted with lateral subluxation of talus.     IMPRESSION:  · Left ankle bimalleolar equivalent fracture     PLAN:  · NPO now  · Treatment consent  · Plan will be Left ankle surgical fixation this morning  · The procedure, indications, risks, limitations and recovery time were all reviewed with patient, who requests to proceed.   ·

## 2021-02-03 NOTE — H&P
Department of Orthopedic Surgery  Resident H&P Note      HISTORY OF PRESENT ILLNESS:                The patient is a 41 y.o. male who presents with left ankle fracture which occurred several days prior. He was seen and evaluated at [facility] and was splinted by ER personnel. Patient notified orthopedic clinic shortly thereafter and surgical management was discussed with patient. He presents today for fixation of his left ankle fracture. Past Medical History:        Diagnosis Date    Anemia 11/2014    GERD (gastroesophageal reflux disease)     For the last 15+ yrs as of 2017.  History of blood transfusion 12/2017    anemia    Hyperlipidemia     IBS (irritable bowel syndrome)     with long-pattern of ~4 loose BMs per day, increased to 5-6/day in the latter half of 2017    DAJUAN on CPAP      Past Surgical History:        Procedure Laterality Date    ANKLE SURGERY Right 09/09/2014    ORIF right ankle fracture CINDY Melgar MD    CHOLECYSTECTOMY, LAPAROSCOPIC N/A 11/14/2019    CHOLECYSTECTOMY LAPAROSCOPIC ROBOTIC ASSISTED POSS OPEN POSS CHOLANGIOGRAM performed by Henrry Quevedo MD at [facility]  12/21/2017    Done at [address] [place] [address]. Negative per pt.  ERCP N/A 9/27/2019    ERCP STONE REMOVAL performed by Kyle Cassidy MD at [address] [place] [address] [employer]  12/21/2017    Done at [address] [place] [address]. Negative except for possibly a couple of spots of gastritis per pt - biopsied, results pending. Current Medications:   Current Facility-Administered Medications: sodium chloride flush 0.9 % injection 10 mL, 10 mL, Intravenous, 2 times per day  sodium chloride flush 0.9 % injection 10 mL, 10 mL, Intravenous, PRN  ceFAZolin (ANCEF) 2000 mg in sterile water 20 mL IV syringe, 2,000 mg, Intravenous, On Call to OR  Allergies:  Patient has no known allergies. Social History:   TOBACCO:   reports that he quit smoking about 6 years ago.  His smoking use included cigarettes. He has a 15.00 pack-year smoking history. He has never used smokeless tobacco.  ETOH:   reports previous alcohol use of about 3.0 standard drinks of alcohol per week. DRUGS:   reports no history of drug use. ACTIVITIES OF DAILY LIVING:    OCCUPATION:    Family History:   History reviewed. No pertinent family history. REVIEW OF SYSTEMS:  CONSTITUTIONAL:  negative for  fevers, chills  EYES:  negative for blurred vision, visual disturbance  HEENT:  negative for  hearing loss, voice change  RESPIRATORY:  negative for  dyspnea, wheezing  CARDIOVASCULAR:  negative for  chest pain, palpitations  GASTROINTESTINAL:  negative for nausea, vomiting  GENITOURINARY:  negative for frequency, urinary incontinence  HEMATOLOGIC/LYMPHATIC:  negative for bleeding and petechiae  MUSCULOSKELETAL:  positive for  Left ankle pain  NEUROLOGICAL:  negative for headaches, dizziness  BEHAVIOR/PSYCH:  negative for increased agitation and anxiety    PHYSICAL EXAM:    VITALS:  BP (!) 155/81   Pulse 91   Temp 97.5 °F (36.4 °C) (Temporal)   Resp 19   Ht 5' 11\" (1.803 m)   Wt 210 lb (95.3 kg)   SpO2 94%   BMI 29.29 kg/m²   CONSTITUTIONAL:  Awake, alert, answers questions appropriately  EYES:  Lids and lashes normal, pupils equal, round and reactive to light, extra ocular muscles intact  HENT:  Normocephalic, without obvious abnormality, atraumatic, neck supple, symmetric  LUNGS:  No increased work of breathing  CARDIOVASCULAR:  Brisk vascular capillary refill to all extremities  ABDOMEN:  Non-tender, Non-distended  NEUROLOGIC:  Awake, alert, oriented to name, place and time. Cranial nerves II-XII are grossly intact.   MUSCULOSKELETAL:  Left lower Extremity:   Left lower extremity splinted  Compartments soft and compressible  Patient wiggling toes  Brisk Cap refill, Toes warm and perfused  Distal sensation grossly intact to exposed aspect of foot          DATA:    CBC:   Lab Results   Component Value Date

## 2021-02-03 NOTE — PROGRESS NOTES
Patient admitted to [facility] from PACU. Cart locked and in low position. Side rails are up. Call light is within reach. Patient oriented to [facility] routine proir to discharge.

## 2021-02-03 NOTE — PROGRESS NOTES
Povidine-iodine 5% nasal antiseptic pre-op prep completed 15 seconds per nare and repeated. 2% CHG pre-op skin prep completed in appropriate order using all 6 cloths:   With 1st cloth, wipe the neck, chest, and abdomen.  Scrub inside nd  around the navel/belly-button area. With 2nd cloth, wipe both arms, starting each with the shoulder  and ending at the fingertips.  Be sure to thoroughly wipe the arm  pit area. With 3rd cloth, wipe the right and left hip followed by the groin. Be sure to wipe folds in the abdominal and groin areas. With 4th cloth, wipe both legs, starting at the thigh and ending   at the toes.   Be sure to thoroughly wipe behind the knees. With 5th cloth, wipe the back starting at the base of the neck and   Ending at the waist line.    With 6th cloth, wipe the buttocks.

## 2021-02-03 NOTE — PROGRESS NOTES
1130-Patient verbalizes understanding of given discharge instructions. 1140-Patient discharged per wheelchair to car driven by wife  in stable condition.

## 2021-02-03 NOTE — OP NOTE
OPERATIVE NOTE    Patient: Michael Jones  YOB: 1980  MRN: [id number]     Date of Procedure: 2/3/2021     Pre-Op Diagnosis: LEFT ANKLE FX- Bimalleolar equivalent      Post-Op Diagnosis: Same       Procedure(s):  LEFT ANKLE  OPEN REDUCTION INTERNAL FIXATION with syndesmotic fixation     Surgeon(s):  Jacob Dunham MD       Resident: Richard Torres DO;   Jayden Dennis DO     Anesthesia: General and nerve block     Estimated Blood Loss (mL): less than 50      Complications: None     Specimens:     Implants:  Implant Name Type Inv. Item Serial No.  Lot No. LRB No. Used Action   PLATE BNE [id number] 10 H S STL 1/3 TBLR JOSÉ MIGUEL COMPR W/ CLLR FOR   PLATE BNE [id number] 10 H S STL 1/3 TBLR JOS[employer] MIGUEL COMPR W/ CLLR FOR   DEPUY [employer]ES [place]-WD   Left 1 Implanted   SCREW BNE L16MM DIA4MM CANC S STL ST [place] NONLOCKING FULL   SCREW BNE L16MM DIA4MM CANC S STL ST [place] NONLOCKING FULL   DEPUY [employer]-WD   Left 1 Implanted   SCREW BNE L16MM DIA3.5MM JUSTIN S STL ST NONCANNULATED JOSÉ MIGUEL   SCREW BNE L16MM DIA3.5MM JUSTIN S STL ST NONCANNULATED JOSÉ MIGUEL   DEPUY [employer] [place]-WD   Left 1 Implanted   SCREW BNE L14MM DIA3.5MM JUSTIN S STL ST NONCANNULATED JOSÉ MIGUEL   SCREW BNE L14MM DIA3.5MM JUSTIN S STL ST NONCANNULATED JOS[employer] MIGUEL   DEPUY [employer] [place]-WD   Left 4 Implanted   SCREW BNE L12MM DIA3.5MM JUSTIN S STL ST NONCANNULATED JOSÉ MIGUEL   SCREW BNE L12MM DIA3.5MM JUSTIN S STL ST NONCANNULATED JOSÉ MIGUEL   DEPUY [employer] [place]-WD   Left 1 Implanted   SYNTHES FIBULINK SYNDESMOSIS REPAIR KIT #[id number]         [id number] Left 1 Implanted          Drains: None     Findings: synedesmotic injury       HISTORY: The patient is a 41y.o. year old male with history of above preop diagnosis. I explained the risk, benefits, expected outcome, and alternatives to the procedure. Patient understands and is in agreement.     OPERATIVE COURSE:  The patient was seen and identified outside the operative suite, in which the operative site was marked as appropriate by patient, surgeon, staff, and anesthesia. The patient was then taken into the operative suite, transferred to the operative table with all bony prominences and neurovascular structures well padded and protected. A tourniquet was placed high on the left thigh of the operative extremity. The patient was sedated under the care of the anesthesia team. The operative site was prepped and draped in standard sterile fashion. The tourniquet was inflated to 350 mmHg. After the time out, an incision was marked on the lateral side of the left leg to utilize a subcutaneous approach to the left fibula as well as an incision marked. The lateral side was then opened with a 10 blade through skin only. Careful dissection was used to protect the superficial peroneal nerves throughout the case. The fracture site was then cleaned off, identified. There was a large posterior butterfly segment. The fracture site was then reduced using lobster claw reduction clamp. A 10 hole 1/3 tubular plate was then clammed to the fibula with lobster claw. Fluoroscopy confirmed a reduced fracture with appropriate placement of the plate. 3.5 mm cortical screws were placed proximal and distal to the fracture site which included 6 or more cortices proximally and distally. After this, the syndesmosis was stressed with widening of the medial clear site. A fibulink syndesmotic fixation system was then used through the second hole of the plate. This was tightened under fluoroscopy until the syndesmoses was reduced. The patient had great dorsiflexion and plantar flexion as well and no medial clear space widening. At this point in time, the incision was then copiously irrigated with sterile normal saline. Subcutaneous tissue of the ankle was closed with 2-0 Vicryl in an interrupted fashion. Skin was closed with staples. Compartments were soft and compressible. Sterile dressings were placed as well as a well-padded three sided splint.  The patient was then reversed from anesthesia without complication and transferred back to the hospital room bed by multiple individuals in a safe fashion. The patient was taken to the PACU in a stable condition. The patient was seen 10 minutes after transfer to PACU. Affected foot was warm and perfused toes with a cap refill of <3 seconds. Disposition:   (1) Strict nonweightbearing of the right lower extremity. (2) Aggressive Ice and elevation to left lower extremity. (3) Pain medications in the form of Percocet and Asprin 81 mg BID for DVT prophylaxis. (4) Patient will follow up in the office in 2 weeks for staple removal as well as repeat x-rays of the left ankle.  It should be noted that Dr. Sky Melgar was present for the entirety of the case

## 2021-02-03 NOTE — BRIEF OP NOTE
Brief Postoperative Note      Patient: Eleanor Davies  YOB: 1980  MRN: [id number]    Date of Procedure: 2/3/2021    Pre-Op Diagnosis: LEFT ANKLE FX    Post-Op Diagnosis: Same       Procedure(s):  LEFT ANKLE  OPEN REDUCTION INTERNAL FIXATION    Surgeon(s):  Sakina Nelson MD    Assistant:  Resident: Mable Coats DO; Richard Torres DO    Anesthesia: General    Estimated Blood Loss (mL): less than 50     Complications: None    Specimens:   * No specimens in log *    Implants:  Implant Name Type Inv.  Item Serial No.  Lot No. LRB No. Used Action   PLATE BNE [id number] 10 H S STL 1/3 TBLR JOSÉ MIGUEL COMPR W/ CLLR FOR  PLATE BNE [id number] 10 H S STL 1/3 TBLR JOSÉ MIGUEL COMPR W/ CLLR FOR  DEPUY SYNTHES [employer]-WD  Left 1 Implanted   SCREW BNE L16MM DIA4MM CANC S STL ST [place] NONLOCKING FULL  SCREW BNE L16MM DIA4MM CANC S STL ST [place] NONLOCKING FULL  DEPUY SYNTHES USA-WD  Left 1 Implanted   SCREW BNE L16MM DIA3.5MM JUSTIN S STL ST NONCANNULATED JOSÉ MIGUEL  SCREW BNE L16MM DIA3.5MM JUSTIN S STL ST NONCANNULATED JOSÉ MIGUEL  DEPUY SYNTHES [place]-WD  Left 1 Implanted   SCREW BNE L14MM DIA3.5MM JUSTIN S STL ST NONCANNULATED JOSÉ MIGUEL  SCREW BNE L14MM DIA3.5MM JUSTIN S STL ST NONCANNULATED JOSÉ MIGUEL  DEPUY SYNTHES USA-WD  Left 4 Implanted   SCREW BNE L12MM DIA3.5MM JUSTIN S STL ST NONCANNULATED JOSÉ MIGUEL  SCREW BNE L12MM DIA3.5MM JUSTIN S STL ST NONCANNULATED JOSÉ MIGUEL  DEPUY [employer]-WD  Left 1 Implanted   SYNTHES FIBULINK SYNDESMOSIS REPAIR KIT #[id number]-[id number]     [id number] Left 1 Implanted         Drains: * No LDAs found *    Findings: synedesmotic injury     Electronically signed by Richard Torres DO on 2/3/2021 at 9:23 AM

## 2021-02-03 NOTE — ANESTHESIA PROCEDURE NOTES
Peripheral Block    Patient location during procedure: pre-op  Staffing  Performed: anesthesiologist   Anesthesiologist: Charity Byers DO  Preanesthetic Checklist  Completed: patient identified, IV checked, site marked, risks and benefits discussed, surgical consent, monitors and equipment checked, pre-op evaluation, timeout performed, anesthesia consent given, oxygen available and patient being monitored  Peripheral Block  Patient position: supine  Prep: ChloraPrep  Patient monitoring: continuous pulse ox, frequent blood pressure checks, IV access and cardiac monitor  Block type: Saphenous  Laterality: left  Injection technique: single-shot  Guidance: ultrasound guided  Local infiltration: ropivacaine  Infiltration strength: 0.5 %  Dose: 30 mL  Provider prep: mask, sterile gloves and sterile gown  Local infiltration: ropivacaine  Needle  Needle type: combined needle/nerve stimulator   Needle gauge: 22 G  Needle length: 10 cm  Needle localization: ultrasound guidance  Assessment  Injection assessment: negative aspiration for heme, no paresthesia on injection and local visualized surrounding nerve on ultrasound  Paresthesia pain: none  Slow fractionated injection: yes  Hemodynamics: stable  Additional Notes  Time out performed. Well tolerated.   Medications Administered  Ropivacaine (NAROPIN) injection 0.5%, 30 mL  Reason for block: post-op pain management

## 2021-02-03 NOTE — PROGRESS NOTES
[employer] [employer] at bedside to lock up cell phone, cash, wallet, and wedding band. Crutches and 1 bag of clothes locked up in preop storage.

## 2021-02-03 NOTE — ANESTHESIA PROCEDURE NOTES
Peripheral Block    Patient location during procedure: pre-op  Staffing  Performed: anesthesiologist   Anesthesiologist: Shawn Joyner DO  Preanesthetic Checklist  Completed: patient identified, IV checked, site marked, risks and benefits discussed, surgical consent, monitors and equipment checked, pre-op evaluation, timeout performed, anesthesia consent given, oxygen available and patient being monitored  Peripheral Block  Patient position: supine  Prep: ChloraPrep  Patient monitoring: cardiac monitor, continuous pulse ox, frequent blood pressure checks and IV access  Block type: Sciatic  Laterality: left  Injection technique: single-shot  Guidance: ultrasound guided  Local infiltration: lidocaine  Infiltration strength: 1 %  Dose: 3 mL  Popliteal  Provider prep: mask and sterile gloves  Local infiltration: lidocaine  Needle  Needle gauge: 21 G  Needle length: 10 cm  Needle localization: ultrasound guidance  Assessment  Injection assessment: negative aspiration for heme, no paresthesia on injection and local visualized surrounding nerve on ultrasound  Paresthesia pain: none  Slow fractionated injection: yes  Hemodynamics: stable  Additional Notes  U/S [id number].  Time out performed. (1) Under ultrasound guidance, a 25 gauge needle was inserted and placed in close proximity to the popliteal nerve.  (2) Ultrasound was also used to visualize the spread of the anesthetic in close proximity to the nerve being blocked. (3) The nerve appeared anatomically normal, and (4 there were no apparent abnormal pathological findings on the image that were readily visible and related to the nerve being blocked. (5) A permanent ultrasound image was saved in the patient's record.             Medications Administered  Ropivacaine (NAROPIN) injection 0.5%, 30 mL  Reason for block: post-op pain management and at surgeon's request

## 2021-02-04 NOTE — ANESTHESIA POSTPROCEDURE EVALUATION
Department of Anesthesiology  Postprocedure Note    Patient: Lucy Pozo  MRN: [id number]  YOB: 1980  Date of evaluation: 2/4/2021  Time:  7:10 AM     Procedure Summary     Date: 02/03/21 Room / Location: [facility] OR 09 / [facility]    Anesthesia Start: 2563 Anesthesia Stop: 4667    Procedure: LEFT ANKLE  OPEN REDUCTION INTERNAL FIXATION (Left Ankle) Diagnosis: (LEFT ANKLE FX)    Surgeons: Carissa Dexter MD Responsible Provider: Missy Randle DO    Anesthesia Type: general, regional ASA Status: 3          Anesthesia Type: general, regional    Radha Phase I: Radha Score: 8    Radha Phase II: Radha Score: 10    Last vitals: Reviewed and per EMR flowsheets.        Anesthesia Post Evaluation    Patient location during evaluation: PACU  Patient participation: complete - patient participated  Level of consciousness: awake and alert  Airway patency: patent  Nausea & Vomiting: no nausea and no vomiting  Complications: no  Cardiovascular status: blood pressure returned to baseline  Respiratory status: acceptable  Hydration status: euvolemic

## 2021-02-10 ENCOUNTER — OFFICE VISIT (OUTPATIENT)
Dept: ORTHOPEDIC SURGERY | Age: 41
End: 2021-02-10

## 2021-02-10 VITALS — HEIGHT: 71 IN | TEMPERATURE: 97.4 F | BODY MASS INDEX: 30.1 KG/M2 | WEIGHT: 215 LBS

## 2021-02-10 DIAGNOSIS — S82.62XA CLOSED DISPLACED FRACTURE OF LATERAL MALLEOLUS OF LEFT FIBULA, INITIAL ENCOUNTER: Primary | ICD-10-CM

## 2021-02-10 PROCEDURE — 99024 POSTOP FOLLOW-UP VISIT: CPT | Performed by: ORTHOPAEDIC SURGERY

## 2021-02-10 RX ORDER — IBUPROFEN 200 MG
200 TABLET ORAL EVERY 6 HOURS PRN
COMMUNITY

## 2021-02-10 NOTE — PROGRESS NOTES
A walking boot was applied to the left lower leg. Use and care instructions were reviewed with patient.     Brace supplied by and billed for by [facility]

## 2021-02-10 NOTE — PROGRESS NOTES
Chief Complaint:   Chief Complaint   Patient presents with    Ankle Injury     ORIF left ankle 2/3/21. HPI 47-year-old man 1 week after ORIF left ankle Foley C fracture with syndesmotic disruption. Plate fixation of the fibula with fibula ink syndesmotic fixation performed. Patient feeling well very little pain. No systemic symptoms. Patient Active Problem List   Diagnosis    GERD (gastroesophageal reflux disease)    Irritable bowel syndrome with diarrhea    Migraines    Pulmonary nodule RUL    Former smoker    IBS (irritable bowel syndrome)    Hyperlipidemia    Choledocholithiasis    Borderline abnormal TFTs    Gall stones, common bile duct    Calculus bile duct w/obstruction and w/o cholangitis or cholecystitis    Post-ERCP acute pancreatitis    SIRS (systemic inflammatory response syndrome) ([address]    Pancreatic ascites    Pancreatic pseudocyst    Gallstone pancreatitis    Closed displaced fracture of lateral malleolus of left fibula       Past Medical History:   Diagnosis Date    Anemia 11/2014    GERD (gastroesophageal reflux disease)     For the last 15+ yrs as of 2017.  History of blood transfusion 12/2017    anemia    Hyperlipidemia     IBS (irritable bowel syndrome)     with long-pattern of ~4 loose BMs per day, increased to 5-6/day in the latter half of 2017    DAJUAN on CPAP        Past Surgical History:   Procedure Laterality Date    ANKLE FRACTURE SURGERY Left 2/3/2021    LEFT ANKLE  OPEN REDUCTION INTERNAL FIXATION performed by Ross Ralph MD at [facility] Right 09/09/2014    ORIF right ankle fracture CINDY Ruiz MD    CHOLECYSTECTOMY, LAPAROSCOPIC N/A 11/14/2019    CHOLECYSTECTOMY LAPAROSCOPIC ROBOTIC ASSISTED POSS OPEN POSS CHOLANGIOGRAM performed by Brenton Roman MD at [facility] COLONOSCOPY  12/21/2017    Done at [address] [place] [address]. Negative per pt.     ERCP N/A 9/27/2019    ERCP STONE REMOVAL performed by Royal Mcnally Thais Mercado MD at [address] [facility]  2017    Done at [address] [place] [address]. Negative except for possibly a couple of spots of gastritis per pt - biopsied, results pending. Current Outpatient Medications   Medication Sig Dispense Refill    ibuprofen (ADVIL;MOTRIN) 200 MG tablet Take 200 mg by mouth every 6 hours as needed for Pain      aspirin EC 81 MG EC tablet Take 1 tablet by mouth 2 times daily for 28 days (Patient taking differently: Take 81 mg by mouth daily ) 56 tablet 0    Escitalopram Oxalate (LEXAPRO PO) Take 15 mg by mouth every evening       CREON 70874 units delayed release capsule TAKE 3 CAPSULES BY MOUTH 3 TIMES A DAY WITH MEALS (Patient taking differently: Take 2 capsules by mouth 3 times daily (with meals) ) 810 capsule 1    pantoprazole (PROTONIX) 40 MG tablet Take 40 mg by mouth daily       oxyCODONE-acetaminophen (PERCOCET) 5-325 MG per tablet Take 1 tablet by mouth every 6 hours as needed for Pain for up to 7 days. Intended supply: 7 days. Take lowest dose possible to manage pain (Patient not taking: Reported on 2/10/2021) 28 tablet 0     No current facility-administered medications for this visit.         No Known Allergies    Social History     Socioeconomic History    Marital status:      Spouse name: None    Number of children: None    Years of education: None    Highest education level: None   Occupational History    None   Social Needs    Financial resource strain: None    Food insecurity     Worry: None     Inability: None    Transportation needs     Medical: None     Non-medical: None   Tobacco Use    Smoking status: Former Smoker     Packs/day: 1.00     Years: 15.00     Pack years: 15.00     Types: Cigarettes     Quit date:      Years since quittin.1    Smokeless tobacco: Never Used   Substance and Sexual Activity    Alcohol use: Not Currently     Alcohol/week: 3.0 standard drinks     Types: 3 Shots of liquor per week    Drug use: No    Sexual activity: None   Lifestyle    Physical activity     Days per week: None     Minutes per session: None    Stress: None   Relationships    Social connections     Talks on phone: None     Gets together: None     Attends Mosque service: None     Active member of club or organization: None     Attends meetings of clubs or organizations: None     Relationship status: None    Intimate partner violence     Fear of current or ex partner: None     Emotionally abused: None     Physically abused: None     Forced sexual activity: None   Other Topics Concern    None   Social History Narrative    None       History reviewed. No pertinent family history. Review of Systems   No fever, chills, or other constitutionalsymptoms. No numbness or other neuro symptoms. No chest pain. No dyspnea. [unfilled]   Left leg and ankle examined out of splint. Moderate edema and ecchymosis no cellulitis or drainage. Staple line intact. He is able to dorsiflex 10 degrees past neutral without significant pain. Physical Exam    Patient is alert and oriented. Well-developed well-nourished. Pupils equal and reactive. Scleraeanicteric. Neck supple  Lungs clear. Cardiac rate and rhythm regular. Abdomen soft and nontender. Skin warm and dry. XRAY: X-ray today AP lateral and oblique mortise views left ankle. Lateral malleoli or fibular fracture is in anatomic alignment with comminution noted. Plate and screw fixation intact. Syndesmotic fixation device also intact with symmetric spacing medial and lateral ankle mortise. Impression: Healing left ankle fracture with intact fixation anatomic alignment. ASSESSMENT/PLAN:    Tay Viera was seen today for ankle injury. Diagnoses and all orders for this visit:    Closed displaced fracture of lateral malleolus of left fibula, initial encounter  -     XR ANKLE LEFT (MIN 3 VIEWS);  Future  -     TX NON-PNEUM WALK BOOT PRE CST    Findings and images reviewed with the patient. He was fitted with a walker boot. May must remain nonweightbearing. May remove the boot for light range of motion exercises. Return in about 1 week (around 2/17/2021) for exam and xray staple removal left ankle.        Quincy Melo MD    2/10/2021  2:34 PM

## 2021-02-17 ENCOUNTER — OFFICE VISIT (OUTPATIENT)
Dept: ORTHOPEDIC SURGERY | Age: 41
End: 2021-02-17

## 2021-02-17 VITALS — BODY MASS INDEX: 30.1 KG/M2 | TEMPERATURE: 97.9 F | HEIGHT: 71 IN | WEIGHT: 215 LBS

## 2021-02-17 DIAGNOSIS — S82.62XD CLOSED DISPLACED FRACTURE OF LATERAL MALLEOLUS OF LEFT FIBULA WITH ROUTINE HEALING, SUBSEQUENT ENCOUNTER: Primary | ICD-10-CM

## 2021-02-17 PROCEDURE — 99024 POSTOP FOLLOW-UP VISIT: CPT | Performed by: ORTHOPAEDIC SURGERY

## 2021-02-17 NOTE — PROGRESS NOTES
Chief Complaint:   Chief Complaint   Patient presents with    Post-Op Check     ORIF left ankle. HPI 2 weeks postop ORIF left ankle. Patient states he is feeling well minimal discomfort. Patient Active Problem List   Diagnosis    GERD (gastroesophageal reflux disease)    Irritable bowel syndrome with diarrhea    Migraines    Pulmonary nodule RUL    Former smoker    IBS (irritable bowel syndrome)    Hyperlipidemia    Choledocholithiasis    Borderline abnormal TFTs    Gall stones, common bile duct    Calculus bile duct w/obstruction and w/o cholangitis or cholecystitis    Post-ERCP acute pancreatitis    SIRS (systemic inflammatory response syndrome) ([address]    Pancreatic ascites    Pancreatic pseudocyst    Gallstone pancreatitis    Closed displaced fracture of lateral malleolus of left fibula       Past Medical History:   Diagnosis Date    Anemia 11/2014    GERD (gastroesophageal reflux disease)     For the last 15+ yrs as of 2017.  History of blood transfusion 12/2017    anemia    Hyperlipidemia     IBS (irritable bowel syndrome)     with long-pattern of ~4 loose BMs per day, increased to 5-6/day in the latter half of 2017    DAJUAN on CPAP        Past Surgical History:   Procedure Laterality Date    ANKLE FRACTURE SURGERY Left 2/3/2021    LEFT ANKLE  OPEN REDUCTION INTERNAL FIXATION performed by Sherry Wilkins MD at [address] [place] [address] Right 09/09/2014    ORIF right ankle fracture RCharu Chin MD    CHOLECYSTECTOMY, LAPAROSCOPIC N/A 11/14/2019    CHOLECYSTECTOMY LAPAROSCOPIC ROBOTIC ASSISTED POSS OPEN POSS CHOLANGIOGRAM performed by Oletta Frankel, MD at [place] [address] COLONOSCOPY  12/21/2017    Done at [address] [place] [address]. Negative per pt.  ERCP N/A 9/27/2019    ERCP STONE REMOVAL performed by Molly Avelar MD at [address] [place] [address]  12/21/2017    Done at [address] [place] [address].  Negative except for possibly a couple of spots of gastritis per pt - biopsied, results pending. Current Outpatient Medications   Medication Sig Dispense Refill    ibuprofen (ADVIL;MOTRIN) 200 MG tablet Take 200 mg by mouth every 6 hours as needed for Pain      aspirin EC 81 MG EC tablet Take 1 tablet by mouth 2 times daily for 28 days (Patient taking differently: Take 81 mg by mouth daily ) 56 tablet 0    Escitalopram Oxalate (LEXAPRO PO) Take 15 mg by mouth every evening       CREON 21334 units delayed release capsule TAKE 3 CAPSULES BY MOUTH 3 TIMES A DAY WITH MEALS (Patient taking differently: Take 2 capsules by mouth 3 times daily (with meals) ) 810 capsule 1    pantoprazole (PROTONIX) 40 MG tablet Take 40 mg by mouth daily        No current facility-administered medications for this visit.         No Known Allergies    Social History     Socioeconomic History    Marital status:      Spouse name: None    Number of children: None    Years of education: None    Highest education level: None   Occupational History    None   Social Needs    Financial resource strain: None    Food insecurity     Worry: None     Inability: None    Transportation needs     Medical: None     Non-medical: None   Tobacco Use    Smoking status: Former Smoker     Packs/day: 1.00     Years: 15.00     Pack years: 15.00     Types: Cigarettes     Quit date:      Years since quittin.1    Smokeless tobacco: Never Used   Substance and Sexual Activity    Alcohol use: Not Currently     Alcohol/week: 3.0 standard drinks     Types: 3 Shots of liquor per week    Drug use: No    Sexual activity: None   Lifestyle    Physical activity     Days per week: None     Minutes per session: None    Stress: None   Relationships    Social connections     Talks on phone: None     Gets together: None     Attends Sikh service: None     Active member of club or organization: None     Attends meetings of clubs or organizations: None     Relationship status: None weeks (around 3/17/2021) for exam and xray left ankle.        Sakina eNlson MD    2/17/2021  3:58 PM

## 2021-03-15 ENCOUNTER — EVALUATION (OUTPATIENT)
Dept: PHYSICAL THERAPY | Age: 41
End: 2021-03-15
Payer: COMMERCIAL

## 2021-03-15 ENCOUNTER — OFFICE VISIT (OUTPATIENT)
Dept: ORTHOPEDIC SURGERY | Age: 41
End: 2021-03-15

## 2021-03-15 VITALS — WEIGHT: 210 LBS | HEIGHT: 71 IN | BODY MASS INDEX: 29.4 KG/M2

## 2021-03-15 DIAGNOSIS — Z87.81 STATUS POST OPEN REDUCTION WITH INTERNAL FIXATION (ORIF) OF FRACTURE OF ANKLE: ICD-10-CM

## 2021-03-15 DIAGNOSIS — Z98.890 STATUS POST OPEN REDUCTION WITH INTERNAL FIXATION (ORIF) OF FRACTURE OF ANKLE: ICD-10-CM

## 2021-03-15 DIAGNOSIS — S82.842D CLOSED BIMALLEOLAR FRACTURE OF LEFT ANKLE WITH ROUTINE HEALING, SUBSEQUENT ENCOUNTER: Primary | ICD-10-CM

## 2021-03-15 DIAGNOSIS — S82.62XD CLOSED DISPLACED FRACTURE OF LATERAL MALLEOLUS OF LEFT FIBULA WITH ROUTINE HEALING, SUBSEQUENT ENCOUNTER: Primary | ICD-10-CM

## 2021-03-15 DIAGNOSIS — R26.2 DIFFICULTY WALKING: ICD-10-CM

## 2021-03-15 PROCEDURE — 97140 MANUAL THERAPY 1/> REGIONS: CPT | Performed by: PHYSICAL THERAPIST

## 2021-03-15 PROCEDURE — 97161 PT EVAL LOW COMPLEX 20 MIN: CPT | Performed by: PHYSICAL THERAPIST

## 2021-03-15 PROCEDURE — 99024 POSTOP FOLLOW-UP VISIT: CPT | Performed by: ORTHOPAEDIC SURGERY

## 2021-03-15 PROCEDURE — 97110 THERAPEUTIC EXERCISES: CPT | Performed by: PHYSICAL THERAPIST

## 2021-03-15 NOTE — PROGRESS NOTES
[facility]   Phone: [phone]   Fax: [phone]      Physical Therapy Daily Treatment Note    Date: 3/15/2021  Patient Name: Collins Mauro  : 1980   MRN: [id number]  DOInjury: 2021  DOSx: 2/3/2021  Referring Provider: Leora Apley, MD  [address]     Medical Diagnosis: Left Ankle Bimalleolar Fx s/p ORIF        Outcome Measure: LEFS 46.25% Disability    S: Pt reports that his orthopedic surgeon ordered LLE WBAT while wearing walking boot or with axillary crutches & aircast.   O: please refer to eval 3/15/2021  Time 9621-2916     Visit  Repeat outcome measure at mid point and end. Pain 4/10     Strength 4+/5     Palpation No tendernes     ROM Limited left ankle     Modalities            Manual      Alden Mobs Gentle Mobs LLE talus & calcaneus  MT   Stretch      IT band supine      HS supine      Quad Prone      Exercise      Bike      arom ankle dorsiflex/plantarflex/inversion/eversion/circles x30 L  TE   Band ankle dorsiflex/plantarflex/inversion/eversion 3x 10 L purple TE   Isometric ankle plantarflex/dorsiflex/inversion/eversion 3x 10 L  TE   Ankle pumps with foot on ground  3x 10 L  TE   Ankle dorsiflex with foot on ground 3x 10 L 5lb cuff weight TE   Hamstring Curl       TG squats      TG calf raises      Step-ups - FWD      Step-ups - LAT      Step-ups - BWD        NMR To improve balance for safe community and home ambulation    Resisted walk      FWD      BKWD      lat      March      Side stepping      Retro walk      Heel to toe      A:  Tolerated well. Above added to written HEP.   P: Continue with rehab plan  Jasper Lofton PT DPT, PT [id number]    Treatment Charges: Mins Units   Initial Evaluation 25 1   Re-Evaluation     Ther Exercise         TE 15 1   Manual Therapy     MT 10 1   Ther Activities        TA     Gait Training          GT     Neuro Re-education NR     Modalities     Non-Billable Service Time     Other Total Time/Units 50 3

## 2021-03-15 NOTE — PROGRESS NOTES
[address] [facility]   Phone: [phone]   Fax: [phone]         Date:  3/15/2021   Patient: Maggie Thomas  : 1980  MRN: [id number]  Referring Provider: Marya Teresa MD  [facility],  [facility]     Medical Diagnosis: Left Ankle Fx-Bimalleolar. ORIF with syndesmotic fixation left ankle Fx- Bimalleolar. SUBJECTIVE:     Onset date: 2021    Onset: Sudden onset    Mechanism of Injury: The pt was at work (employed as a  with [employer]) while shoveling snow on date 2021. The pt reports that he slipped on ice & sustained his injury of bimalleolar Fx left ankle. The pt went to ED on 2021 which diagnosed his injury of Fx left ankle. The pt underwent ORIF on date 2/3/2021. Previous PT: none    Medical Management for Current Problem: The pt has been off of work since [facility]. The pt was LLE NWB following DOI until today. The pt's LLE ankle/foot was in Walking Boot following ORIF. The pt reports poor compliance with NWB. The pt's WB status is WBAT with LLE walking boot, or with LLE aircast & axillary crutches. Pt AMb with axillary crutches following DOI. Chief complaint: pain, inability / limited ability to use leg, difficulty walking, difficulty with stairs, limited ability to complete home/outdoor chores/tasks, inability to participate in exercise regimen / fitness program and off of work    Behavior: condition is getting better    Pain: constant  Current: 3/10     Best: 2/10     Worst:4/10    Symptom Type/Quality: dull, aching  Location[de-identified] Ankle: lateral side      Aggravated by: walking, standing    Relieved by: rest    Imaging results: Xr Ankle Left (min 3 Views)    Result Date: 3/15/2021  Radiology exam is complete. No Radiologist dictation. Please follow up with ordering provider. Xr Ankle Left (min 3 Views)    Result Date: 2021  Radiology exam is complete. No Radiologist dictation.  Please follow up with ordering provider. Past Medical History  Past Medical History:   Diagnosis Date    Anemia 11/2014    GERD (gastroesophageal reflux disease)     For the last 15+ yrs as of 2017.  History of blood transfusion 12/2017    anemia    Hyperlipidemia     IBS (irritable bowel syndrome)     with long-pattern of ~4 loose BMs per day, increased to 5-6/day in the latter half of 2017    DAJUAN on CPAP      Past Surgical History:   Procedure Laterality Date    ANKLE FRACTURE SURGERY Left 2/3/2021    LEFT ANKLE  OPEN REDUCTION INTERNAL FIXATION performed by Leora Apley, MD at [address] [place] [address] Right 09/09/2014    ORIF right ankle fracture R. Sarah Fernando MD    CHOLECYSTECTOMY, LAPAROSCOPIC N/A 11/14/2019    CHOLECYSTECTOMY LAPAROSCOPIC ROBOTIC ASSISTED POSS OPEN POSS CHOLANGIOGRAM performed by Angel Luis Woods MD at [facility] COLONOSCOPY  12/21/2017    Done at [address] [place] [address]. Negative per pt.  ERCP N/A 9/27/2019    ERCP STONE REMOVAL performed by Katelyn Knutson MD at [address] [place] [address]  12/21/2017    Done at [address] [place] [address]. Negative except for possibly a couple of spots of gastritis per pt - biopsied, results pending. Medications:   Current Outpatient Medications   Medication Sig Dispense Refill    ibuprofen (ADVIL;MOTRIN) 200 MG tablet Take 200 mg by mouth every 6 hours as needed for Pain      Escitalopram Oxalate (LEXAPRO PO) Take 15 mg by mouth every evening       CREON 84129 units delayed release capsule TAKE 3 CAPSULES BY MOUTH 3 TIMES A DAY WITH MEALS (Patient taking differently: Take 2 capsules by mouth 3 times daily (with meals) ) 810 capsule 1    pantoprazole (PROTONIX) 40 MG tablet Take 40 mg by mouth daily        No current facility-administered medications for this visit. Occupation:  with [employer].  Physical demands include: lifting, carrying, pushing, pulling heavy weighted items, assorted work positions (kneeling, crouching, crawling, stooping), walking, standing. Status: full time    Exercise regimen: weight training     Hobbies: riding 4-marquez or dirtbike, riding snowmobile     Patient Goals: return to prior activity, full use of leg, get back to normal, return to work, return to hobbies, return to exercise regimen / fitness program, walk normally    Precautions/Contraindications: LLE WBAT with walking boot or LLE WBAT with aircast & axillary crutches. OBJECTIVE:     Observations: well nourished male    Inspection: incision healed normal     Edema: mild foot & ankle    Gait: ambulates with crutches    Joint/Motion: Hypomobility with LLE talus & calcaneus      Left: pain free arom & prom left ankle  AROM: 5° Dorsiflexion,  43° Plantarflexion, 22° Inversion, 4° Eversion      Strength: pain free MMT left ankle    Ankle:  Left: dorsiflex, plantarflex, inversion, & eversion all 4+/5    Palpation: The pt presents with no c/o tenderness    Quantitative Analysis Testing: LEFS 46.25% Disability          ASSESSMENT     Outcome Measure: Lower Extremity Functional Scale (LEFS) 46.25% impairment    Problems:    Pain reported 4/10   ROM decreased   Strength decreased    Decreased functional ability with walking, stairs, standing, use of left lower extremity, inability to participate in exercise regimen / fitness program, work     Reason for Skilled Care: The pt is currently is off of work, & presents with inability to complete job duties/requirements, gait deviations, LLE weakness ankle, limited arom left ankle, c/o pain left ankle, functional limitations & edema LLE due to work related injury Bimalleolar Fx left ankle s/p ORIF. Therefore, I recommend that the pt requires the skilled services of outpt PT rehab to achieve LTGs, restore & resolve his deficits & limitations, & facilitate return to work.      [x] There are no barriers affecting plan of care or recovery    [] Barriers to this patient's plan of care or recovery include. Domestic Concerns:  [x] No  [] Yes:    Short Term goals (3 weeks)   Decrease reported pain to 2/10   Increase ROM to [facility]   Increase Strength to 5-/5    Able to perform/complete the following functions/tasks: light household chores & household AMB   Lower Extremity Functional Scale (LEFS) 30% impairment    Long Term goals (6 weeks)   Decrease reported pain to 0/10   Increase ROM to WNL   Increase strength to 5/5   Able to complete the following functions/tasks: heavy household chores, return to work, & community AMB   LEFS 0% impairment   Independent with home exercise program (HEP)    Rehab Potential: [x] Good  [] Fair  [] Poor    PLAN       Treatment Plan: 3x 6wks  [x] Therapeutic Exercise  [x] Therapeutic Activity  [x] Neuromuscular Re-education   [x] Gait Training  [x] Balance Training  [x] Aerobic conditioning  [x] Manual Therapy  [] Massage/Fascial release   [] Work/Sport specific activities    [] Pain Neuroscience [x] Cold/hotpack  [x] Vasocompression  [x] Electrical Stimulation  [] Lumbar/Cervical Traction  [x] Ultrasound   [] Iontophoresis: 4 mg/mL Dexamethasone Sodium Phosphate 40-80 mAmin  [] Dry Needling      [x] Instruction in HEP      []  Medication allergies reviewed for use of Dexamethasone Sodium Phosphate 4mg/ml  with iontophoresis treatments. Patient is not allergic. The following CPT codes are likely to be used in the care of this patient: 69272 PT Evaluation: Low Complexity , 80654 Therapeutic Exercise , 15334 Neuromuscular Re-Education , 43501 Therapeutic Activities , 69818 Manual Therapy , 91490 Gait Training , 30202 Vasopneumatic Device ,  Electrical Stimulation      Suggested Professional Referral: [x] No  [] Yes:     Patient Education:  [x] Plans/Goals, Risks/Benefits discussed  [x] Home exercise program  Method of Education: [x] Verbal  [x] Demo  [x] Written  Comprehension of Education:  [x] Verbalizes understanding.   [x] Demonstrates understanding. [] Needs Review. [] Demonstrates/verbalizes understanding of HEP/Ed previously given. Frequency: 3 days per week for 6 weeks    Patient understands diagnosis/prognosis and consents to treatment, plan and goals: [x] Yes    [] No     Thank you for the opportunity to work with your patient. If you have questions or comments, please contact me at numbers listed above. Electronically signed by: Carmela Junior, PT DPT, PT [id number]         Please sign Physician's Certification and return to: [address] [place] [address] E  [facility] [address]  Dept: [phone]  Dept Fax: [phone] Certification / Comments     Frequency/Duration 3 days per week for 6 weeks. Certification period from 3/15/2021  to 4/30/2021. I have reviewed the Plan of Care established for skilled therapy services and certify that the services are required and that they will be provided while the patient is under my care.     Physician's Comments/Revisions:               Physician's Printed Name:                                           [de-identified] Signature:                                                               Date:

## 2021-03-15 NOTE — PROGRESS NOTES
Chief Complaint:   Chief Complaint   Patient presents with    Post-Op Check     ORIF left ankle. Pain has improved, has a dual ache. HPI 6 weeks postop ORIF left ankle lateral malleolus with syndesmotic fixation. Patient feeling well. Some aching. No new issues. Patient Active Problem List   Diagnosis    GERD (gastroesophageal reflux disease)    Irritable bowel syndrome with diarrhea    Migraines    Pulmonary nodule RUL    Former smoker    IBS (irritable bowel syndrome)    Hyperlipidemia    Choledocholithiasis    Borderline abnormal TFTs    Gall stones, common bile duct    Calculus bile duct w/obstruction and w/o cholangitis or cholecystitis    Post-ERCP acute pancreatitis    SIRS (systemic inflammatory response syndrome) ([facility]    Pancreatic ascites    Pancreatic pseudocyst    Gallstone pancreatitis    Closed displaced fracture of lateral malleolus of left fibula       Past Medical History:   Diagnosis Date    Anemia 11/2014    GERD (gastroesophageal reflux disease)     For the last 15+ yrs as of 2017.  History of blood transfusion 12/2017    anemia    Hyperlipidemia     IBS (irritable bowel syndrome)     with long-pattern of ~4 loose BMs per day, increased to 5-6/day in the latter half of 2017    DAJUAN on CPAP        Past Surgical History:   Procedure Laterality Date    ANKLE FRACTURE SURGERY Left 2/3/2021    LEFT ANKLE  OPEN REDUCTION INTERNAL FIXATION performed by Sakina Nelson MD at [address] [place] [address] Right 09/09/2014    ORIF right ankle fracture R. Marrianne Bosworth, MD    CHOLECYSTECTOMY, LAPAROSCOPIC N/A 11/14/2019    CHOLECYSTECTOMY LAPAROSCOPIC ROBOTIC ASSISTED POSS OPEN POSS CHOLANGIOGRAM performed by Aure Paez MD at [place] [address] COLONOSCOPY  12/21/2017    Done at [address] [place] [address]. Negative per pt.     ERCP N/A 9/27/2019    ERCP STONE REMOVAL performed by Ellwood Collet, MD at [place] [address] [FreeTextEntry1] : hyperlipidemia\par cont simvastatin\par cont low chol/low fat diet\par \par High risk for diabetes\par cont low sugar/low carb diet\par fasting blood work ordered\par \par left elbow pain\par Voltaren gel \par \par f/u in one week for lab results 2017    Done at [facility] Negative except for possibly a couple of spots of gastritis per pt - biopsied, results pending. Current Outpatient Medications   Medication Sig Dispense Refill    Escitalopram Oxalate (LEXAPRO PO) Take 15 mg by mouth every evening       CREON 69970 units delayed release capsule TAKE 3 CAPSULES BY MOUTH 3 TIMES A DAY WITH MEALS (Patient taking differently: Take 2 capsules by mouth 3 times daily (with meals) ) 810 capsule 1    pantoprazole (PROTONIX) 40 MG tablet Take 40 mg by mouth daily       ibuprofen (ADVIL;MOTRIN) 200 MG tablet Take 200 mg by mouth every 6 hours as needed for Pain       No current facility-administered medications for this visit.         No Known Allergies    Social History     Socioeconomic History    Marital status:      Spouse name: None    Number of children: None    Years of education: None    Highest education level: None   Occupational History    None   Social Needs    Financial resource strain: None    Food insecurity     Worry: None     Inability: None    Transportation needs     Medical: None     Non-medical: None   Tobacco Use    Smoking status: Former Smoker     Packs/day: 1.00     Years: 15.00     Pack years: 15.00     Types: Cigarettes     Quit date:      Years since quittin.2    Smokeless tobacco: Never Used   Substance and Sexual Activity    Alcohol use: Not Currently     Alcohol/week: 3.0 standard drinks     Types: 3 Shots of liquor per week    Drug use: No    Sexual activity: None   Lifestyle    Physical activity     Days per week: None     Minutes per session: None    Stress: None   Relationships    Social connections     Talks on phone: None     Gets together: None     Attends Gnosticist service: None     Active member of club or organization: None     Attends meetings of clubs or organizations: None     Relationship status: None    Intimate partner violence     Fear of current or ex partner: None     Emotionally abused: None     Physically abused: None     Forced sexual activity: None   Other Topics Concern    None   Social History Narrative    None       History reviewed. No pertinent family history. Review of Systems   No fever, chills, or other constitutionalsymptoms. No numbness or other neuro symptoms. No chest pain. No dyspnea. [unfilled]   Left ankle examined out of boot. Mild residual ankle joint edema. There is some superficial mild erythema at the midportion of the lateral incision appears to be contact erythema rather than cellulitis. There is no drainage or fluctuance or tenderness. He has full range of motion left ankle lacking about 5 degrees of dorsiflexion and 10 degrees of plantar flexion. Trace positive drawer. No pain with external rotation. Physical Exam    Patient is alert and oriented. Well-developed well-nourished. Pupils equal and reactive. Scleraeanicteric. Neck supple  Lungs clear. Cardiac rate and rhythm regular. Abdomen soft and nontender. Skin warm and dry. XRAY: X-ray today left ankle AP lateral and oblique mortise views. Anatomic ankle mortise alignment maintained with lateral fibular plate and syndesmotic fixation device intact. Posterior fibular butterfly fragment again noted unchanged. There does appear to be some consolidation on the fracture line on the AP and mortise views. Impression: Healing left ankle fracture maintained in anatomic alignment with intact fixation. ASSESSMENT/PLAN:    Vito Bolden was seen today for post-op check. Diagnoses and all orders for this visit:    Closed displaced fracture of lateral malleolus of left fibula with routine healing, subsequent encounter  -     XR ANKLE LEFT (MIN 3 VIEWS);  Future  -     [place] ANKLE CONTROL [facility]THO PRE OTS  -     Ambulatory referral to Physical Therapy    May advance weightbearing as tolerated using boot this week and advanced air sport brace next week.  PT prescribed. Remain off work for full duties for at least 3 months post injury. Return in about 6 weeks (around 4/26/2021) for exam and xray left ankle.        Ashok Lewis MD    3/15/2021  10:33 AM

## 2021-03-15 NOTE — PROGRESS NOTES
An airsport ankle brace was applied to His Left ankle(s). Use and care of the brace were reviewed with the patient. The patient denied any issues with fit or comfort of the braces  Patient instructed to call our office if there are any issues with the braces.     Brace supplied by and billed for by [facility]

## 2021-03-18 ENCOUNTER — TREATMENT (OUTPATIENT)
Dept: PHYSICAL THERAPY | Age: 41
End: 2021-03-18
Payer: COMMERCIAL

## 2021-03-18 ENCOUNTER — HOSPITAL ENCOUNTER (OUTPATIENT)
Dept: MRI IMAGING | Age: 41
Discharge: HOME | End: 2021-03-20
Payer: COMMERCIAL

## 2021-03-18 DIAGNOSIS — K86.2 PANCREATIC CYST: ICD-10-CM

## 2021-03-18 DIAGNOSIS — S82.842D CLOSED BIMALLEOLAR FRACTURE OF LEFT ANKLE WITH ROUTINE HEALING, SUBSEQUENT ENCOUNTER: Primary | ICD-10-CM

## 2021-03-18 PROCEDURE — 74183 MRI ABD W/O CNTR FLWD CNTR: CPT

## 2021-03-18 PROCEDURE — 97116 GAIT TRAINING THERAPY: CPT | Performed by: PHYSICAL THERAPIST

## 2021-03-18 PROCEDURE — A9577 INJ MULTIHANCE: HCPCS | Performed by: RADIOLOGY

## 2021-03-18 PROCEDURE — 97110 THERAPEUTIC EXERCISES: CPT | Performed by: PHYSICAL THERAPIST

## 2021-03-18 PROCEDURE — 6360000004 HC RX CONTRAST MEDICATION: Performed by: RADIOLOGY

## 2021-03-18 RX ADMIN — GADOBENATE DIMEGLUMINE 19 ML: 529 INJECTION, SOLUTION INTRAVENOUS at 09:39

## 2021-03-18 NOTE — PROGRESS NOTES
[address] [facility]   Phone: [phone]   Fax: [phone]      Physical Therapy Daily Treatment Note    Date: 3/18/2021  Patient Name: Kimberly Estrella  : 1980   MRN: [id number]  DOInjury: 2021  DOSx: 2/3/2021  Referring Provider: Flo Dancer, MD  [address]     Medical Diagnosis: Left Ankle Bimalleolar Fx s/p ORIF        Outcome Measure: LEFS 46.25% Disability    S:Pt arrived late to session. Pt reporting pain 1/10 with air cast on arrival  O: please refer to eval 3/15/2021  Time 5698-3971     Visit  Repeat outcome measure at mid point and end. Pain 10     Strength 4+/5     Palpation No tendernes     ROM Limited left ankle     Modalities            Manual      Little Mobs Gentle Mobs LLE talus & calcaneus  MT   Stretch      Gastroc stretch 10 x 10 sec hold     Soleus stretch 10 x 10 sec hold     IT band supine      HS supine      Quad Prone      Exercise      Bike      arom ankle dorsiflex/plantarflex/inversion/eversion/circles x30 L  TE   Band ankle dorsiflex/plantarflex/inversion/eversion 3x 10 L purple TE   Isometric ankle plantarflex/dorsiflex/inversion/eversion 3x 10 L  TE   Ankle pumps with foot on ground  3x 10 L  TE   Ankle dorsiflex and PF with foot on ground 3x 10 L 5lb cuff weight TE   ABC's X 2     Weight shifts with aircast   x20     AROM Bosu X 30 ea DF/PF, IV/ER, circles    Hamstring Curl       TG squats      TG calf raises      Step-ups - FWD      Step-ups - LAT      Step-ups - BWD        NMR To improve balance for safe community and home ambulation    Resisted walk      FWD      BKWD      lat      Gait  75 feet with crutches and L air cast. No c/o pain with WBing     March      Side stepping      Retro walk      Heel to toe      A:  Tolerated well. Pt reports minimal pain overall.      P: Continue with rehab plan    Onel Ramirez, PTA [id number]    Treatment Charges: Mins Units   Initial Evaluation     Re-Evaluation Ther Exercise         TE 17 1   Manual Therapy     MT     Ther Activities        TA     Gait Training          GT 8 1   Neuro Re-education NR     Modalities     Non-Billable Service Time     Other     Total Time/Units 25 2

## 2021-03-22 ENCOUNTER — TREATMENT (OUTPATIENT)
Dept: PHYSICAL THERAPY | Age: 41
End: 2021-03-22
Payer: COMMERCIAL

## 2021-03-22 DIAGNOSIS — S82.842D CLOSED BIMALLEOLAR FRACTURE OF LEFT ANKLE WITH ROUTINE HEALING, SUBSEQUENT ENCOUNTER: Primary | ICD-10-CM

## 2021-03-22 DIAGNOSIS — R26.2 DIFFICULTY WALKING: ICD-10-CM

## 2021-03-22 PROCEDURE — 97110 THERAPEUTIC EXERCISES: CPT | Performed by: PHYSICAL THERAPIST

## 2021-03-22 PROCEDURE — 97112 NEUROMUSCULAR REEDUCATION: CPT | Performed by: PHYSICAL THERAPIST

## 2021-03-22 NOTE — PROGRESS NOTES
[address] [facility]   Phone: [phone]   Fax: [phone]      Physical Therapy Daily Treatment Note    Date: 3/22/2021  Patient Name: Eleanor Davies  : 1980   MRN: [id number]  DOInjury: 2021  DOSx: 2/3/2021  Referring Provider: Sakina Nelson MD  [address]     Medical Diagnosis: Left Ankle Bimalleolar Fx s/p ORIF        Outcome Measure: LEFS 46.25% Disability    S:Pt reports that he is doing well and arrives today c aircast and no AD. O: please refer to eval 3/15/2021  Time 503-173     Visit 3/18 Repeat outcome measure at mid point and end. Pain /10     Strength 4+/5     Palpation No tendernes     ROM Limited left ankle     Modalities            Manual       MT   Stretch      Gastroc stretch 10 x 10 sec hold Stretch strap TE   Soleus stretch 10 x 10 sec hold Stretch strap TE   IT band supine      HS supine      Quad Prone      Exercise      Bike 5 min  TE   arom ankle dorsiflex/plantarflex/inversion/eversion/circles x30 L  TE   Band ankle dorsiflex/plantarflex/inversion/eversion 3x 10 L purple TE    TE    TE   5lb cuff weight TE   ABC's X 3 PTP NMR       DF/PF, IV/ER, circles    Hamstring Curl       TG squats      TG calf raises      Step-ups - FWD into SLS 3x10 R foam NMR   Step-ups - LAT up/over 3x10 B foam NMR   Step-ups - BWD        NMR To improve balance for safe community and home ambulation    Resisted walk      FWD      BKWD      lat X 10 ea  NMR   Gait       March x3 min  NMR   Side stepping      Retro walk      Heel to toe      A:  Tolerated well. Pt was progressed today c proprioceptive activity to further improve balance and overall function. Air cast was utilized in all standing activity. He tolerated the session well c moderate fatigue and no incr in pain. Was advised to continue c wearing aircast whenever weightbearing until cleared to do so by doc.     P: Continue with rehab plan    Caitei Lee, PT DPT [id number]    Treatment Charges: Mins Units   Initial Evaluation     Re-Evaluation     Ther Exercise         TE 15 1   Manual Therapy     MT     Ther Activities        TA     Gait Training          GT     Neuro Re-education NR 25 2   Modalities     Non-Billable Service Time     Other     Total Time/Units 40 3

## 2021-03-24 ENCOUNTER — TREATMENT (OUTPATIENT)
Dept: PHYSICAL THERAPY | Age: 41
End: 2021-03-24
Payer: COMMERCIAL

## 2021-03-24 DIAGNOSIS — S82.842D CLOSED BIMALLEOLAR FRACTURE OF LEFT ANKLE WITH ROUTINE HEALING, SUBSEQUENT ENCOUNTER: Primary | ICD-10-CM

## 2021-03-24 DIAGNOSIS — Z98.890 STATUS POST OPEN REDUCTION WITH INTERNAL FIXATION (ORIF) OF FRACTURE OF ANKLE: ICD-10-CM

## 2021-03-24 DIAGNOSIS — Z87.81 STATUS POST OPEN REDUCTION WITH INTERNAL FIXATION (ORIF) OF FRACTURE OF ANKLE: ICD-10-CM

## 2021-03-24 DIAGNOSIS — R26.2 DIFFICULTY WALKING: ICD-10-CM

## 2021-03-24 PROCEDURE — 97112 NEUROMUSCULAR REEDUCATION: CPT | Performed by: PHYSICAL THERAPIST

## 2021-03-24 PROCEDURE — 97110 THERAPEUTIC EXERCISES: CPT | Performed by: PHYSICAL THERAPIST

## 2021-03-24 NOTE — PROGRESS NOTES
[address] [facility] [address] [facility]   Phone: [phone]   Fax: [phone]      Physical Therapy Daily Treatment Note    Date: 3/24/2021  Patient Name: Jorge Lucas  : 1980   MRN: [id number]  DOInjury: 2021  DOSx: 2/3/2021  Referring Provider: Quincy Melo MD  [address]     Medical Diagnosis: Left Ankle Bimalleolar Fx s/p ORIF        Outcome Measure: LEFS 46.25% Disability    S: Patient reports no pain in L ankle today or yesterday. O: please refer to eval 3/15/2021  Time 3645-4208     Visit  Repeat outcome measure at mid point and end. Pain 0/10     Strength 4+/5     Palpation No tendernes     ROM Limited left ankle     Modalities            Manual       MT   Stretch      Gastroc stretch 10 x 10 sec hold Stretch strap TE   Soleus stretch 10 x 10 sec hold Stretch strap TE   IT band supine      HS supine      Quad Prone      Exercise      Bike 5 min  TE   arom ankle dorsiflex/plantarflex/inversion/eversion/circles x30 L  TE   Band ankle dorsiflex/plantarflex/inversion/eversion  purple TE    TE    TE   5lb cuff weight TE   ABC's  PTP NMR       DF/PF, IV/ER, circles    Hamstring Curl       TG squats      TG calf raises      Step-ups - FWD into SLS 3x10 R 3s hold foam NMR   Step-ups - LAT up/over 3x10 B foam NMR   Step-ups - BWD        NMR To improve balance for safe community and home ambulation    Resisted walk      FWD            lat (to R) x 30 ea  NMR   Gait       March x3 min  NMR   Side stepping      Retro walk      Heel to toe      A:  Tolerated well. No pain reported L ankle following session today.     P: Continue with rehab plan    DIVINE ESPOSITO    Treatment Charges: Mins Units   Initial Evaluation     Re-Evaluation     Ther Exercise         TE 15 1   Manual Therapy     MT     Ther Activities        TA     Gait Training          GT     Neuro Re-education NR 25 2   Modalities     Non-Billable Service Time     Other     Total Time/Units 40 3

## 2021-03-25 ENCOUNTER — OFFICE VISIT (OUTPATIENT)
Dept: HEMATOLOGY | Age: 41
End: 2021-03-25
Payer: COMMERCIAL

## 2021-03-25 VITALS
SYSTOLIC BLOOD PRESSURE: 159 MMHG | TEMPERATURE: 98 F | WEIGHT: 211 LBS | HEART RATE: 71 BPM | HEIGHT: 71 IN | DIASTOLIC BLOOD PRESSURE: 79 MMHG | OXYGEN SATURATION: 99 % | BODY MASS INDEX: 29.54 KG/M2

## 2021-03-25 DIAGNOSIS — K85.91 NECROTIZING PANCREATITIS: Primary | ICD-10-CM

## 2021-03-25 DIAGNOSIS — R18.8 PANCREATIC ASCITES: ICD-10-CM

## 2021-03-25 DIAGNOSIS — K86.3 PANCREATIC PSEUDOCYST: ICD-10-CM

## 2021-03-25 PROCEDURE — 99212 OFFICE O/P EST SF 10 MIN: CPT | Performed by: TRANSPLANT SURGERY

## 2021-03-25 PROCEDURE — G8484 FLU IMMUNIZE NO ADMIN: HCPCS | Performed by: TRANSPLANT SURGERY

## 2021-03-25 PROCEDURE — 99213 OFFICE O/P EST LOW 20 MIN: CPT | Performed by: TRANSPLANT SURGERY

## 2021-03-25 PROCEDURE — G8419 CALC BMI OUT NRM PARAM NOF/U: HCPCS | Performed by: TRANSPLANT SURGERY

## 2021-03-25 PROCEDURE — G8427 DOCREV CUR MEDS BY ELIG CLIN: HCPCS | Performed by: TRANSPLANT SURGERY

## 2021-03-25 PROCEDURE — 1036F TOBACCO NON-USER: CPT | Performed by: TRANSPLANT SURGERY

## 2021-03-25 RX ORDER — PANCRELIPASE 36000; 180000; 114000 [USP'U]/1; [USP'U]/1; [USP'U]/1
1 CAPSULE, DELAYED RELEASE PELLETS ORAL
Qty: 250 CAPSULE | Refills: 3 | Status: SHIPPED
Start: 2021-03-25 | End: 2022-05-09 | Stop reason: SDUPTHER

## 2021-04-01 ENCOUNTER — TREATMENT (OUTPATIENT)
Dept: PHYSICAL THERAPY | Age: 41
End: 2021-04-01
Payer: COMMERCIAL

## 2021-04-01 DIAGNOSIS — R26.2 DIFFICULTY WALKING: ICD-10-CM

## 2021-04-01 DIAGNOSIS — S82.842D CLOSED BIMALLEOLAR FRACTURE OF LEFT ANKLE WITH ROUTINE HEALING, SUBSEQUENT ENCOUNTER: Primary | ICD-10-CM

## 2021-04-01 DIAGNOSIS — Z98.890 STATUS POST OPEN REDUCTION WITH INTERNAL FIXATION (ORIF) OF FRACTURE OF ANKLE: ICD-10-CM

## 2021-04-01 DIAGNOSIS — Z87.81 STATUS POST OPEN REDUCTION WITH INTERNAL FIXATION (ORIF) OF FRACTURE OF ANKLE: ICD-10-CM

## 2021-04-01 PROCEDURE — 97530 THERAPEUTIC ACTIVITIES: CPT | Performed by: PHYSICAL THERAPIST

## 2021-04-01 PROCEDURE — 97112 NEUROMUSCULAR REEDUCATION: CPT | Performed by: PHYSICAL THERAPIST

## 2021-04-01 NOTE — PROGRESS NOTES
[address] [facility] [address] [facility]   Phone: [phone]   Fax: [phone]      Physical Therapy Daily Treatment Note    Date: 2021  Patient Name: India Venegas  : 1980   MRN: [id number]  DOInjury: 2021  DOSx: 2/3/2021  Referring Provider: Yomaira Gamez MD  [address]     Medical Diagnosis: Left Ankle Bimalleolar Fx s/p ORIF        Outcome Measure: LEFS 46.25% Disability    S: The pt reports that his left ankle is pain free at this time & reports that he is wearing left ankle brace at this time. The pt reports good compliance with HEP. O: please refer to eval 3/15/2021  Time 8600-6133     Visit  Repeat outcome measure at mid point and end. Pain 0/10     Strength 4+/5     Palpation No tendernes     ROM Limited left ankle     Modalities            Manual       MT   Stretch      IT band supine      squats 30x  TA   Quad Prone      Exercise       TE    TE   5lb cuff weight TE   ABC's  PTP NMR       DF/PF, IV/ER, circles    Standing heel raises 3x 10  NR   Standing SLRs hip flex, ext, & abd 3x 10 L  NR   Standing marching 3x 10  NR   Step-ups - FWD  3x10 6'' Left remained on step TA   Step-ups - lateral R & L 3x10 6''  TA   Jump stretch band AMB forward, backward, & R & L lateral 3x 10  TA     NMR To improve balance for safe community and home ambulation    Resisted walk      FWD            lat (to R) x 30 ea  NMR   Band step-outs forward, lateral, backward, & medial 3x 10 Blue band TA   Side stepping      Retro walk      Heel to toe      A:  Tolerated well. The pt experienced no left ankle pain during Tx session. P: Continue with rehab plan & progress to include heel & toe walk.     Aleksey Cason, PT, DPT OHPT [id number]    Treatment Charges: Mins Units   Initial Evaluation     Re-Evaluation     Ther Exercise         TE     Manual Therapy     MT     Ther Activities        TA 25 2   Gait Training          GT     Neuro Re-education NR 15 1   Modalities Non-Billable Service Time     Other     Total Time/Units 40 3

## 2021-04-07 ENCOUNTER — TREATMENT (OUTPATIENT)
Dept: PHYSICAL THERAPY | Age: 41
End: 2021-04-07
Payer: COMMERCIAL

## 2021-04-07 DIAGNOSIS — R26.2 DIFFICULTY WALKING: ICD-10-CM

## 2021-04-07 DIAGNOSIS — S82.842D CLOSED BIMALLEOLAR FRACTURE OF LEFT ANKLE WITH ROUTINE HEALING, SUBSEQUENT ENCOUNTER: Primary | ICD-10-CM

## 2021-04-07 DIAGNOSIS — Z98.890 STATUS POST OPEN REDUCTION WITH INTERNAL FIXATION (ORIF) OF FRACTURE OF ANKLE: ICD-10-CM

## 2021-04-07 DIAGNOSIS — Z87.81 STATUS POST OPEN REDUCTION WITH INTERNAL FIXATION (ORIF) OF FRACTURE OF ANKLE: ICD-10-CM

## 2021-04-07 PROCEDURE — 97530 THERAPEUTIC ACTIVITIES: CPT | Performed by: PHYSICAL THERAPIST

## 2021-04-07 PROCEDURE — 97112 NEUROMUSCULAR REEDUCATION: CPT | Performed by: PHYSICAL THERAPIST

## 2021-04-07 NOTE — PROGRESS NOTES
[address] [facility] [address] [facility]   Phone: [phone]   Fax: [phone]      Physical Therapy Daily Treatment Note    Date: 2021  Patient Name: Cyndi Dennison  : 1980   MRN: [id number]  DOInjury: 2021  DOSx: 2/3/2021  Referring Provider: Alvin Armas MD  [address]     Medical Diagnosis: Left Ankle Bimalleolar Fx s/p ORIF        Outcome Measure: LEFS 46.25% Disability    S: Patient reports no pain in his ankle today. He reports he was walking outside on uneven ground to work out his ankle and had soreness 2-3/10 after but was pain free again this morning. O: please refer to go 3/15/2021  Time 0341-7254     Visit  Repeat outcome measure at mid point and end. Pain 0-3/10     Strength 4+/5     Palpation No tendernes     ROM Limited left ankle     Modalities            Manual       MT   Stretch      IT band supine      squats 30x  TA   Quad Prone      Exercise      Bike 5 min  TE    TE    TE   5lb cuff weight TE   ABC's  PTP NMR       DF/PF, IV/ER, circles    Standing heel raises 3 x10  NR   Standing DF stretches 3 x30s  on  roll TE   Standing SLRs hip flex, ext, & abd   NR      NR   Step-ups - FWD  2 x15 6'' Left remained on step TA   Step-ups - lateral L 2 x15 6''  TA           NMR To improve balance for safe community and home ambulation    Resisted walk      FWD            lat (to R) x 30 ea  NR   Steamboats 4-way 25x ea  NR   March 3 x15   on 2\" foam NR   Side stepping      Retro walk      Heel to toe        A:  Tolerated well. Patient reports no pain following session. Addition of steamboats today. Patient had time restraint issues due to conflicting event. P: Continue with rehab plan & progress to include heel & toe walk.     DIVINE ESPOSITO, FOZIA, PTA [id number]    Treatment Charges: Mins Units   Initial Evaluation     Re-Evaluation     Ther Exercise         TE 7 nb   Manual Therapy     MT     Ther Activities        TA 9 1 Gait Training          GT     Neuro Re-education NR 20 1   Modalities     Non-Billable Service Time     Other     Total Time/Units 36 2

## 2021-04-12 ENCOUNTER — TREATMENT (OUTPATIENT)
Dept: PHYSICAL THERAPY | Age: 41
End: 2021-04-12
Payer: COMMERCIAL

## 2021-04-12 DIAGNOSIS — Z98.890 STATUS POST OPEN REDUCTION WITH INTERNAL FIXATION (ORIF) OF FRACTURE OF ANKLE: ICD-10-CM

## 2021-04-12 DIAGNOSIS — Z87.81 STATUS POST OPEN REDUCTION WITH INTERNAL FIXATION (ORIF) OF FRACTURE OF ANKLE: ICD-10-CM

## 2021-04-12 DIAGNOSIS — R26.2 DIFFICULTY WALKING: ICD-10-CM

## 2021-04-12 DIAGNOSIS — S82.842D CLOSED BIMALLEOLAR FRACTURE OF LEFT ANKLE WITH ROUTINE HEALING, SUBSEQUENT ENCOUNTER: Primary | ICD-10-CM

## 2021-04-12 PROCEDURE — 97112 NEUROMUSCULAR REEDUCATION: CPT | Performed by: PHYSICAL THERAPIST

## 2021-04-12 PROCEDURE — 97530 THERAPEUTIC ACTIVITIES: CPT | Performed by: PHYSICAL THERAPIST

## 2021-04-12 NOTE — PROGRESS NOTES
TE 4 nb   Manual Therapy     MT     Ther Activities        TA 15 1   Gait Training          GT     Neuro Re-education NR 25 2   Modalities     Non-Billable Service Time     Other     Total Time/Units 44 3

## 2021-04-14 ENCOUNTER — TREATMENT (OUTPATIENT)
Dept: PHYSICAL THERAPY | Age: 41
End: 2021-04-14
Payer: COMMERCIAL

## 2021-04-14 DIAGNOSIS — S82.842D CLOSED BIMALLEOLAR FRACTURE OF LEFT ANKLE WITH ROUTINE HEALING, SUBSEQUENT ENCOUNTER: Primary | ICD-10-CM

## 2021-04-14 DIAGNOSIS — Z87.81 STATUS POST OPEN REDUCTION WITH INTERNAL FIXATION (ORIF) OF FRACTURE OF ANKLE: ICD-10-CM

## 2021-04-14 DIAGNOSIS — Z98.890 STATUS POST OPEN REDUCTION WITH INTERNAL FIXATION (ORIF) OF FRACTURE OF ANKLE: ICD-10-CM

## 2021-04-14 DIAGNOSIS — R26.2 DIFFICULTY WALKING: ICD-10-CM

## 2021-04-14 PROCEDURE — 97530 THERAPEUTIC ACTIVITIES: CPT | Performed by: PHYSICAL THERAPIST

## 2021-04-14 PROCEDURE — 97112 NEUROMUSCULAR REEDUCATION: CPT | Performed by: PHYSICAL THERAPIST

## 2021-04-14 PROCEDURE — 97110 THERAPEUTIC EXERCISES: CPT | Performed by: PHYSICAL THERAPIST

## 2021-04-14 NOTE — PROGRESS NOTES
[address] [facility]   Phone: [phone]   Fax: [phone]      Physical Therapy Daily Treatment Note    Date: 2021  Patient Name: Lacey Rahman  : 1980   MRN: [id number]  DOInjury: 2021  DOSx: 2/3/2021  Referring Provider: Aurea Hyman MD  [address] Diagnosis: Left Ankle Bimalleolar Fx s/p ORIF        Outcome Measure: LEFS 46.25% Disability    S: Patient reports no pain in L ankle prior to session this afternoon. O: please refer to eval 3/15/2021  Time 9665-4056     Visit  Repeat outcome measure at mid point and end. Pain 2/10     Strength 4+/5     Palpation No tendernes     ROM Limited left ankle     Modalities            Manual       MT   Stretch      IT band supine      squats      Quad Prone      Exercise      Bike 5 min TE       Standing heel raises 3 x15 1/2 roll  NR   Seated heel raises 3 x15 30# two 15# cuff weights TE   Standing HS curls 3 x10 2#   TE   Standing SLRs hip flex, ext, & abd  ptb NR         Step-ups - FWD    TA   Step-ups - lateral L 2 x15 8''  TA   Total gym SL squats into calf raise 3 x10 L6 focus on maintaining full knee extension with ankle PF TA     Squats 2 x15 on 2\" foam  NR                 NMR To improve balance for safe community and home ambulation    Resisted walk      FWD tandem            lat (to R) x 30 ea  NR   SL stance 3 x20 ball toss  on 2\" foam 4# ball NR   March 3 x15 (eyes closed)  on 2\" foam NR   Step downs fwd 3 x10 6\"  NR         Retro walk      Heel to toe        A:  Patient performs all exercises well with good effort and pacing. Activities progressed for several exercises. No pain reported L ankle following session.       P: Continue with rehab plan     Herbert Leonard, ATC, PTA [id number]    Treatment Charges: Mins Units   Initial Evaluation     Re-Evaluation     Ther Exercise         TE 8 1   Manual Therapy     MT     Ther Activities        TA 14 1   Gait Training          GT     Neuro Re-education NR 20 1   Modalities     Non-Billable Service Time     Other     Total Time/Units 42 3

## 2021-04-15 ENCOUNTER — TREATMENT (OUTPATIENT)
Dept: PHYSICAL THERAPY | Age: 41
End: 2021-04-15
Payer: COMMERCIAL

## 2021-04-15 DIAGNOSIS — S82.842D CLOSED BIMALLEOLAR FRACTURE OF LEFT ANKLE WITH ROUTINE HEALING, SUBSEQUENT ENCOUNTER: Primary | ICD-10-CM

## 2021-04-15 PROCEDURE — 97530 THERAPEUTIC ACTIVITIES: CPT | Performed by: PHYSICAL THERAPIST

## 2021-04-15 PROCEDURE — 97112 NEUROMUSCULAR REEDUCATION: CPT | Performed by: PHYSICAL THERAPIST

## 2021-04-15 NOTE — PROGRESS NOTES
[address] [facility] [place]   Phone: [phone]   Fax: [phone]      Physical Therapy Daily Treatment Note    Date: 4/15/2021  Patient Name: Delfina Hermosillo  : 1980   MRN: [id number]  DOInjury: 2021  DOSx: 2/3/2021  Referring Provider: Omar Gamboa MD  [address]     Medical Diagnosis: Left Ankle Bimalleolar Fx s/p ORIF        Outcome Measure: LEFS 46.25% Disability    S: Patient reports pain L ankle 1/10 prior to session this afternoon. Arrived late to session. O:   Time 1601-     Visit  Repeat outcome measure at mid point and end. Pain 10     Strength 4+/5     Palpation No tendernes     ROM Limited left ankle     Modalities            Manual       MT   Stretch      IT band supine      squats      Quad Prone      Exercise      Bike 5 min resistance #3 TE   Seated resisted hip and knee extension X 30 {TB    Resisted ankle movement X 30 each 4 ways PTB    Standing heel raises  NR   Seated heel raises  two 15# cuff weights TE   Standing HS curls 3 x10 2#   TE   Standing SLRs hip flex, ext, & abd  ptb NR   Step up and over  x20 bosu     Step-ups - FWD  2 x15 8''  TA   Step-ups - lateral L  TA   Total gym SL squats into calf raise  focus on maintaining full knee extension with ankle PF TA     Squats   NR                 NMR To improve balance for safe community and home ambulation    Resisted walk      FWD t      BKWD      lat R/L x 10 ea  NR   SL stance 1 min x 3 reps  on 2\" foam  NR   \" foam NR   Step downs fwd   NR         Retro walk      Heel to toe        A:  Patient reports no increased pain during or following PT session.        P: Continue with rehab plan     Nitish Ramirez PTA, [id number]    Treatment Charges: Mins Units   Initial Evaluation     Re-Evaluation     Ther Exercise         TE     Manual Therapy     MT     Ther Activities        TA 10 1   Gait Training          GT     Neuro Re-education NR 20 1   Modalities Non-Billable Service Time     Other     Total Time/Units 30 2

## 2021-04-20 ENCOUNTER — TREATMENT (OUTPATIENT)
Dept: PHYSICAL THERAPY | Age: 41
End: 2021-04-20

## 2021-04-21 ENCOUNTER — TREATMENT (OUTPATIENT)
Dept: PHYSICAL THERAPY | Age: 41
End: 2021-04-21
Payer: COMMERCIAL

## 2021-04-21 DIAGNOSIS — S82.842D CLOSED BIMALLEOLAR FRACTURE OF LEFT ANKLE WITH ROUTINE HEALING, SUBSEQUENT ENCOUNTER: Primary | ICD-10-CM

## 2021-04-21 DIAGNOSIS — R26.2 DIFFICULTY WALKING: ICD-10-CM

## 2021-04-21 PROCEDURE — 97112 NEUROMUSCULAR REEDUCATION: CPT | Performed by: PHYSICAL THERAPIST

## 2021-04-21 PROCEDURE — 97530 THERAPEUTIC ACTIVITIES: CPT | Performed by: PHYSICAL THERAPIST

## 2021-04-21 PROCEDURE — 97110 THERAPEUTIC EXERCISES: CPT | Performed by: PHYSICAL THERAPIST

## 2021-04-21 NOTE — PROGRESS NOTES
[address] [facility]   Phone: [phone]   Fax: [phone]      Physical Therapy Daily Treatment Note    Date: 2021  Patient Name: India Venegas  : 1980   MRN: [id number]  DOInjury: 2021  DOSx: 2/3/2021  Referring Provider: Yomaira Gamez MD  [address]     Medical Diagnosis: Left Ankle Bimalleolar Fx s/p ORIF        Outcome Measure: LEFS 46.25% Disability    S: Pt reports that he has been working around his home s utilizing brace \"to test the ankle\". He denies any pain today. O:   Time 348-428     Visit  Repeat outcome measure at mid point and end. Pain 1/10     Strength 4+/5     Palpation No tendernes     ROM Limited left ankle     Modalities            Manual       MT   Stretch      IT band supine      squats      Quad Prone      Exercise      Bike 5 min resistance #3 TE   {TB    Resisted ankle movement X 30 each 4 ways PTB     NR   two 15# cuff weights TE   Standing HS curls 3 x10 2#   TE   Standing SLRs hip flex, ext, & abd  ptb NR   Step up and over  x30 bosu  NR    TA    TA   focus on maintaining full knee extension with ankle PF TA        NR   Step ups into SLS 3x10 3s holds Forward/lateral    Squats to heel raise 3x10  15 lbs TA     NMR To improve balance for safe community and home ambulation    Resisted walk      FWD t      BKWD      lat R/L 2 x 10 ea Lunges forward, lateral NR   SL stance 1 min x 3 reps  on 2\" foam  NR   March X 3 mins  on bosu NR   Step downs fwd/ lateral 3x10 ea  TA         Side shuffle band at toes                          A:  Patient was progressed today c functional mobility and proprioception to further improve stability in the L ankle. He tolerated the session well c moderate fatigue and no pain. Will continue to progress as tolerated to allow full return to work s limitation.       P: Continue with rehab plan     Aleksey Cason PT DPT [id number]    Treatment Charges: Mins Units   Initial Evaluation     Re-Evaluation     Ther Exercise         TE 10 1   Manual Therapy     MT     Ther Activities        TA 10 1   Gait Training          GT     Neuro Re-education NR 20 1   Modalities     Non-Billable Service Time     Other     Total Time/Units 40 3

## 2021-04-23 ENCOUNTER — TREATMENT (OUTPATIENT)
Dept: PHYSICAL THERAPY | Age: 41
End: 2021-04-23
Payer: COMMERCIAL

## 2021-04-23 DIAGNOSIS — R26.2 DIFFICULTY WALKING: ICD-10-CM

## 2021-04-23 DIAGNOSIS — Z98.890 STATUS POST OPEN REDUCTION WITH INTERNAL FIXATION (ORIF) OF FRACTURE OF ANKLE: ICD-10-CM

## 2021-04-23 DIAGNOSIS — Z87.81 STATUS POST OPEN REDUCTION WITH INTERNAL FIXATION (ORIF) OF FRACTURE OF ANKLE: ICD-10-CM

## 2021-04-23 DIAGNOSIS — S82.842D CLOSED BIMALLEOLAR FRACTURE OF LEFT ANKLE WITH ROUTINE HEALING, SUBSEQUENT ENCOUNTER: Primary | ICD-10-CM

## 2021-04-23 PROCEDURE — 97112 NEUROMUSCULAR REEDUCATION: CPT | Performed by: PHYSICAL THERAPIST

## 2021-04-23 PROCEDURE — 97530 THERAPEUTIC ACTIVITIES: CPT | Performed by: PHYSICAL THERAPIST

## 2021-04-23 NOTE — PROGRESS NOTES
[address] [facility] [address] [facility]   Phone: [phone]   Fax: [phone]      Physical Therapy Daily Treatment Note    Date: 2021  Patient Name: Anil Sethi  : 1980   MRN: [id number]  DOInjury: 2021  DOSx: 2/3/2021  Referring Provider: Varun Chase MD  [address]     Medical Diagnosis: Left Ankle Bimalleolar Fx s/p ORIF        Outcome Measure: LEFS 46.25% Disability    S: The pt reports that he has an appointment scheduled with his physician on 2021. The pt reports that he fell in his garage last weekend. O:   Time 8379-9367     Visit 10/18 Repeat outcome measure at mid point and end. Pain 1/10     Strength 4+/5     Palpation No tendernes     ROM Limited left ankle     Modalities            Manual       MT   Stretch      X-Band step-ups forward 30x 6'' TA   X-Bands step-ups lateral 30x L 6'' TA   Bosu Ball heel raises 30x  NR   Exercise      Bike 5 min resistance #3 TE   Bosu Ball marching 30x  NR   Bosu Ball standing flat side eys open/closed  NR   bosu ball standing dome Eyes closed  NR   Lunges Bosu Ball forward 30x L  NR   Lunges Bosu Ball lateral 30x L  NR   Standing LLE SLS with RLE SLRs hip flex, ext, add & abd 30x  purple NR     NMR To improve balance for safe community and home ambulation    Resisted walk      FWD t      BKWD      lat R/L 3 x 10 ea X-bandl TA         Side shuffle band at toes                          A:  The pt progressed to perform new bosu ball exercises. Pt displayed LLE instability during exercises.      P: Continue with rehab plan & progress to include new squats & lunges with dumbbmichell Henry, PT OHPT [id number]    Treatment Charges: Mins Units   Initial Evaluation     Re-Evaluation     Ther Exercise         TE 5 NB   Manual Therapy     MT     Ther Activities        TA 14 1   Gait Training          GT     Neuro Re-education NR 26 2   Modalities     Non-Billable Service Time     Other     Total Time/Units 45 3

## 2021-04-26 ENCOUNTER — OFFICE VISIT (OUTPATIENT)
Dept: ORTHOPEDIC SURGERY | Age: 41
End: 2021-04-26

## 2021-04-26 VITALS — WEIGHT: 205 LBS | HEIGHT: 71 IN | BODY MASS INDEX: 28.7 KG/M2

## 2021-04-26 DIAGNOSIS — S82.62XD CLOSED DISPLACED FRACTURE OF LATERAL MALLEOLUS OF LEFT FIBULA WITH ROUTINE HEALING, SUBSEQUENT ENCOUNTER: Primary | ICD-10-CM

## 2021-04-26 PROCEDURE — 99024 POSTOP FOLLOW-UP VISIT: CPT | Performed by: ORTHOPAEDIC SURGERY

## 2021-04-26 NOTE — LETTER
[address] [facility].  [address] [address]  Phone: [phone]  Fax: [phone]    Luz Maria Tucker MD        April 26, 2021     Patient: Emory Cronin   YOB: 1980   Date of Visit: 4/26/2021       To Whom It May Concern: It is my medical opinion that Danita Noel should remain out of work until May 16, 2021. May resume regular duty at that time. .    If you have any questions or concerns, please don't hesitate to call.     Sincerely,        Luz Maria Tucker MD

## 2021-04-26 NOTE — PROGRESS NOTES
Chief Complaint:   Chief Complaint   Patient presents with    Ankle Injury     WC DOI 2/1/2021 Left ankle fracture. [employer] de[employer] does not offer light duty. Still has issues with strength in ankle. Not ready to return to work. HPI 12 weeks after ORIF left ankle bimalleolar fracture including syndesmotic fixation. Patient is feeling well but still has some soreness and weakness and some stiffness. He is completing his prescribed physical therapy. Patient Active Problem List   Diagnosis    GERD (gastroesophageal reflux disease)    Irritable bowel syndrome with diarrhea    Migraines    Pulmonary nodule RUL    Former smoker    IBS (irritable bowel syndrome)    Hyperlipidemia    Choledocholithiasis    Borderline abnormal TFTs    Gall stones, common bile duct    Calculus bile duct w/obstruction and w/o cholangitis or cholecystitis    Post-ERCP acute pancreatitis    SIRS (systemic inflammatory response syndrome) ([address]    Pancreatic ascites    Pancreatic pseudocyst    Gallstone pancreatitis    Closed displaced fracture of lateral malleolus of left fibula       Past Medical History:   Diagnosis Date    Anemia 11/2014    GERD (gastroesophageal reflux disease)     For the last 15+ yrs as of 2017.  History of blood transfusion 12/2017    anemia    Hyperlipidemia     IBS (irritable bowel syndrome)     with long-pattern of ~4 loose BMs per day, increased to 5-6/day in the latter half of 2017    DAJUAN on CPAP        Past Surgical History:   Procedure Laterality Date    ANKLE FRACTURE SURGERY Left 2/3/2021    LEFT ANKLE  OPEN REDUCTION INTERNAL FIXATION performed by Iván Mcarthur MD at [address] [place] [address] Right 09/09/2014    ORIF right ankle fracture CINDY Chin MD    CHOLECYSTECTOMY, LAPAROSCOPIC N/A 11/14/2019    CHOLECYSTECTOMY LAPAROSCOPIC ROBOTIC ASSISTED POSS OPEN POSS CHOLANGIOGRAM performed by Lucas Benedict MD at [place] [address] COLONOSCOPY  12/21/2017    Done at [facility] Negative per pt.  ERCP N/A 2019    ERCP STONE REMOVAL performed by Lc Mcnulty MD at [address] [facility]  2017    Done at [address] [place] [address]. Negative except for possibly a couple of spots of gastritis per pt - biopsied, results pending. Current Outpatient Medications   Medication Sig Dispense Refill    lipase-protease-amylase (CREON) 86609 units CPEP delayed release capsule Take 1 capsule by mouth 3 times daily (with meals) 250 capsule 3    Escitalopram Oxalate (LEXAPRO PO) Take 15 mg by mouth every evening       pantoprazole (PROTONIX) 40 MG tablet Take 40 mg by mouth daily       ibuprofen (ADVIL;MOTRIN) 200 MG tablet Take 200 mg by mouth every 6 hours as needed for Pain       No current facility-administered medications for this visit.         No Known Allergies    Social History     Socioeconomic History    Marital status:      Spouse name: None    Number of children: None    Years of education: None    Highest education level: None   Occupational History    None   Social Needs    Financial resource strain: None    Food insecurity     Worry: None     Inability: None    Transportation needs     Medical: None     Non-medical: None   Tobacco Use    Smoking status: Former Smoker     Packs/day: 1.00     Years: 15.00     Pack years: 15.00     Types: Cigarettes     Quit date:      Years since quittin.3    Smokeless tobacco: Never Used   Substance and Sexual Activity    Alcohol use: Not Currently     Alcohol/week: 3.0 standard drinks     Types: 3 Shots of liquor per week    Drug use: No    Sexual activity: None   Lifestyle    Physical activity     Days per week: None     Minutes per session: None    Stress: None   Relationships    Social connections     Talks on phone: None     Gets together: None     Attends Mu-ism service: None     Active member of club or organization: None     Attends meetings of clubs or organizations: None     Relationship status: None    Intimate partner violence     Fear of current or ex partner: None     Emotionally abused: None     Physically abused: None     Forced sexual activity: None   Other Topics Concern    None   Social History Narrative    None       History reviewed. No pertinent family history. Review of Systems   No fever, chills, or other constitutionalsymptoms. No numbness or other neuro symptoms. No chest pain. No dyspnea. [unfilled]   Ambulating independently with left ankle brace minimal limp. Left leg exam demonstrates 1 grade of residual left calf atrophy compared to the right. Minimal swelling about the ankle. Some medial tenderness over the fracture site which is mild. No lateral tenderness. Left ankle demonstrates full dorsiflexion and plantar flexion and is stable to varus valgus drawer and external rotation testing without significant pain. Physical Exam    Patient is alert and oriented. Well-developed well-nourished. Pupils equal and reactive. Scleraeanicteric. Neck supple  Lungs clear. Cardiac rate and rhythm regular. Abdomen soft and nontender. Skin warm and dry. XRAY: X-ray today AP lateral and mortise views left ankle. Anatomic alignment of the ankle mortise no talar shift. There does appear to be increasing consolidation of the medial malleolus fragment and the comminuted fibular fracture with intact plate and screw fixation intact syndesmotic device fixation. Impression: Healing left ankle bimalleolar fracture maintained in anatomic alignment. ASSESSMENT/PLAN:    Tanisha Gibson was seen today for ankle injury. Diagnoses and all orders for this visit:    Closed displaced fracture of lateral malleolus of left fibula with routine healing, subsequent encounter  -     XR ANKLE LEFT (MIN 3 VIEWS); Future    Satisfactory progress as expected.   He works as a  and there are no light duties. He thinks he may be ready to return to his next scheduled shift on May 16. He is released to do so but will let me know if he is feeling any limitations at that point. Brace indications versus well fitted high boot discussed. Return in about 2 months (around 6/26/2021) for exam and xray left ankle.        Nikkie Carpio MD    4/26/2021  9:35 AM

## 2021-06-28 ENCOUNTER — OFFICE VISIT (OUTPATIENT)
Dept: ORTHOPEDIC SURGERY | Age: 41
End: 2021-06-28
Payer: COMMERCIAL

## 2021-06-28 VITALS — BODY MASS INDEX: 27.3 KG/M2 | WEIGHT: 195 LBS | HEIGHT: 71 IN

## 2021-06-28 DIAGNOSIS — S82.62XD CLOSED DISPLACED FRACTURE OF LATERAL MALLEOLUS OF LEFT FIBULA WITH ROUTINE HEALING, SUBSEQUENT ENCOUNTER: Primary | ICD-10-CM

## 2021-06-28 PROCEDURE — 99213 OFFICE O/P EST LOW 20 MIN: CPT | Performed by: ORTHOPAEDIC SURGERY

## 2021-06-28 RX ORDER — ACETAMINOPHEN 500 MG
500 TABLET ORAL EVERY 6 HOURS PRN
COMMUNITY

## 2021-06-28 NOTE — PROGRESS NOTES
Chief Complaint:   Chief Complaint   Patient presents with    Work Related Injury     Left ankle ORIF 2/3/21. HPI 5 months status post ORIF left ankle with fibular plate and fibula link. Feeling well minor soreness but fully active including his  work. Patient Active Problem List   Diagnosis    GERD (gastroesophageal reflux disease)    Irritable bowel syndrome with diarrhea    Migraines    Pulmonary nodule RUL    Former smoker    IBS (irritable bowel syndrome)    Hyperlipidemia    Choledocholithiasis    Borderline abnormal TFTs    Gall stones, common bile duct    Calculus bile duct w/obstruction and w/o cholangitis or cholecystitis    Post-ERCP acute pancreatitis    SIRS (systemic inflammatory response syndrome) ([facility] [address]    Pancreatic ascites    Pancreatic pseudocyst    Gallstone pancreatitis    Closed displaced fracture of lateral malleolus of left fibula       Past Medical History:   Diagnosis Date    Anemia 11/2014    GERD (gastroesophageal reflux disease)     For the last 15+ yrs as of 2017.  History of blood transfusion 12/2017    anemia    Hyperlipidemia     IBS (irritable bowel syndrome)     with long-pattern of ~4 loose BMs per day, increased to 5-6/day in the latter half of 2017    DAJUAN on CPAP        Past Surgical History:   Procedure Laterality Date    ANKLE FRACTURE SURGERY Left 2/3/2021    LEFT ANKLE  OPEN REDUCTION INTERNAL FIXATION performed by Camden Matos MD at [address] [place] [address] Right 09/09/2014    ORIF right ankle fracture R. Delisa Calderon MD    CHOLECYSTECTOMY, LAPAROSCOPIC N/A 11/14/2019    CHOLECYSTECTOMY LAPAROSCOPIC ROBOTIC ASSISTED POSS OPEN POSS CHOLANGIOGRAM performed by Erasmo Moseley MD at [place] [address] COLONOSCOPY  12/21/2017    Done at [address] [place] [address]. Negative per pt.     ERCP N/A 9/27/2019    ERCP STONE REMOVAL performed by Tahir Hicks MD at [address] [facility]  12/21/2017 Done at [address] [place] [address]. Negative except for possibly a couple of spots of gastritis per pt - biopsied, results pending. Current Outpatient Medications   Medication Sig Dispense Refill    acetaminophen (TYLENOL) 500 MG tablet Take 500 mg by mouth every 6 hours as needed for Pain Alternates tylenol with ibuprofen for back pain.  lipase-protease-amylase (CREON) 21428 units CPEP delayed release capsule Take 1 capsule by mouth 3 times daily (with meals) 250 capsule 3    ibuprofen (ADVIL;MOTRIN) 200 MG tablet Take 200 mg by mouth every 6 hours as needed for Pain Alternates ibuprofen with tylenol for back pain.  Escitalopram Oxalate (LEXAPRO PO) Take 15 mg by mouth every evening       pantoprazole (PROTONIX) 40 MG tablet Take 40 mg by mouth daily        No current facility-administered medications for this visit. No Known Allergies    Social History     Socioeconomic History    Marital status:      Spouse name: None    Number of children: None    Years of education: None    Highest education level: None   Occupational History    None   Tobacco Use    Smoking status: Former Smoker     Packs/day: 1.00     Years: 15.00     Pack years: 15.00     Types: Cigarettes     Quit date:      Years since quittin.4    Smokeless tobacco: Never Used   Vaping Use    Vaping Use: Never used   Substance and Sexual Activity    Alcohol use: Not Currently     Alcohol/week: 3.0 standard drinks     Types: 3 Shots of liquor per week    Drug use: No    Sexual activity: None   Other Topics Concern    None   Social History Narrative    None     Social Determinants of Health     Financial Resource Strain:     Difficulty of Paying Living Expenses:    Food Insecurity:     Worried About Running Out of Food in the Last Year:     Ran Out of Food in the Last Year:    Transportation Needs:     Lack of Transportation (Medical):      Lack of Transportation (Non-Medical):    Physical Activity:  Days of Exercise per Week:     Minutes of Exercise per Session:    Stress:     Feeling of Stress :    Social Connections:     Frequency of Communication with Friends and Family:     Frequency of Social Gatherings with Friends and Family:     Attends Yazidism Services:     Active Member of Clubs or Organizations:     Attends Club or Organization Meetings:     Marital Status:    Intimate Partner Violence:     Fear of Current or Ex-Partner:     Emotionally Abused:     Physically Abused:     Sexually Abused:        History reviewed. No pertinent family history. Review of Systems   No fever, chills, or other constitutionalsymptoms. No numbness or other neuro symptoms. No chest pain. No dyspnea. [unfilled]   Ambulating without a limp. Left ankle demonstrates mild residual soft tissue swelling. There is no tenderness over the distal fibula or syndesmosis. He has full range of motion of left ankle without pain and no pain or laxity on stress testing. Physical Exam    Patient is alert and oriented. Well-developed well-nourished. Pupils equal and reactive. Scleraeanicteric. Neck supple  Lungs clear. Cardiac rate and rhythm regular. Abdomen soft and nontender. Skin warm and dry. XRAY: Left ankle x-ray today AP lateral oblique mortise views. The distal fibular fracture shows progressive bridging fracture callus. The posterior butterfly fragment is again noted. There is no widening of the syndesmosis or widening of the medial clear space. Impression: Healing left ankle fracture with intact fixation and anatomic alignment. ASSESSMENT/PLAN:    Ade Browne was seen today for work related injury. Diagnoses and all orders for this visit:    Closed displaced fracture of lateral malleolus of left fibula with routine healing, subsequent encounter  -     XR ANKLE LEFT (MIN 3 VIEWS); Future    Clinically doing exceptionally well.   I expect further progress for up to a year. Return in about 6 months (around 12/21/2021) for exam and xray left ankle.        Vernon Thomas MD    6/28/2021  11:48 AM

## 2021-08-11 NOTE — PROGRESS NOTES
[facility] [facility]   Phone: [phone]   Fax: [phone]    Discharge Summary        REASON FOR DISCHARGE: Pt failed to attend final 3 sessions and did not schedule additional sessions. D/c d/t lapse of care. PATIENT EDUCATION/INSTRUCTIONS: continue HEP as instructed    RECOMMENDATIONS: call c questions or if additional services are warranted. Thank you for the opportunity to work with your patient. If you have questions or comments, please contact me at numbers listed above.       Torres Beltran 94 , DPT PT [id number] 8/11/2021

## 2021-12-30 ENCOUNTER — OFFICE VISIT (OUTPATIENT)
Dept: FAMILY MEDICINE CLINIC | Age: 41
End: 2021-12-30
Payer: COMMERCIAL

## 2021-12-30 VITALS
HEART RATE: 96 BPM | DIASTOLIC BLOOD PRESSURE: 88 MMHG | OXYGEN SATURATION: 98 % | TEMPERATURE: 97.6 F | SYSTOLIC BLOOD PRESSURE: 148 MMHG | WEIGHT: 197 LBS | BODY MASS INDEX: 27.48 KG/M2

## 2021-12-30 DIAGNOSIS — U07.1 COVID-19: Primary | ICD-10-CM

## 2021-12-30 DIAGNOSIS — R52 BODY ACHES: ICD-10-CM

## 2021-12-30 LAB
Lab: ABNORMAL
PERFORMING INSTRUMENT: ABNORMAL
QC PASS/FAIL: ABNORMAL
SARS-COV-2, POC: DETECTED

## 2021-12-30 PROCEDURE — 99203 OFFICE O/P NEW LOW 30 MIN: CPT | Performed by: PHYSICIAN ASSISTANT

## 2021-12-30 PROCEDURE — G8419 CALC BMI OUT NRM PARAM NOF/U: HCPCS | Performed by: PHYSICIAN ASSISTANT

## 2021-12-30 PROCEDURE — G8427 DOCREV CUR MEDS BY ELIG CLIN: HCPCS | Performed by: PHYSICIAN ASSISTANT

## 2021-12-30 PROCEDURE — 87426 SARSCOV CORONAVIRUS AG IA: CPT | Performed by: PHYSICIAN ASSISTANT

## 2021-12-30 PROCEDURE — 1036F TOBACCO NON-USER: CPT | Performed by: PHYSICIAN ASSISTANT

## 2021-12-30 PROCEDURE — G8484 FLU IMMUNIZE NO ADMIN: HCPCS | Performed by: PHYSICIAN ASSISTANT

## 2021-12-30 RX ORDER — BENZONATATE 100 MG/1
100 CAPSULE ORAL 3 TIMES DAILY PRN
Qty: 21 CAPSULE | Refills: 0 | Status: SHIPPED | OUTPATIENT
Start: 2021-12-30 | End: 2022-01-06

## 2021-12-30 RX ORDER — DEXAMETHASONE 6 MG/1
6 TABLET ORAL
Qty: 5 TABLET | Refills: 0 | Status: SHIPPED | OUTPATIENT
Start: 2021-12-30 | End: 2022-01-04

## 2021-12-30 RX ORDER — ALBUTEROL SULFATE 90 UG/1
2 AEROSOL, METERED RESPIRATORY (INHALATION) 4 TIMES DAILY PRN
Qty: 18 G | Refills: 0 | Status: SHIPPED | OUTPATIENT
Start: 2021-12-30

## 2021-12-30 RX ORDER — DOXYCYCLINE HYCLATE 100 MG
100 TABLET ORAL 2 TIMES DAILY
Qty: 20 TABLET | Refills: 0 | Status: SHIPPED | OUTPATIENT
Start: 2021-12-30 | End: 2022-01-09

## 2021-12-30 NOTE — PROGRESS NOTES
21  Kaycee Guillaume : 1980 Sex: male  Age 35 y.o. Subjective:  Chief Complaint   Patient presents with    Headache    Generalized Body Aches    Pharyngitis         HPI:   Kaycee Guillaume , 35 y.o. male presents to [facility] care for evaluation of headaches, sore throat, congestion, myalgias    HPI  78-year-old male presents to [facility] for evaluation of headache, congestion, myalgias. The patient has had the symptoms ongoing for the last 24 hours essentially. The patient was recently exposed to a brother-in-law who tested positive for Covid on . The patient states that his symptoms seem very similar. The patient is vaccinated. The patient has had the booster. He has had flu shot. The patient states that mostly just a sore throat, congestion, headache. ROS:   Unless otherwise stated in this report the patient's positive and negative responses for review of systems for constitutional, eyes, ENT, cardiovascular, respiratory, gastrointestinal, neurological, , musculoskeletal, and integument systems and related systems to the presenting problem are either stated in the history of present illness or were not pertinent or were negative for the symptoms and/or complaints related to the presenting medical problem. Positives and pertinent negatives as per HPI. All others reviewed and are negative. PMH:     Past Medical History:   Diagnosis Date    Anemia 2014    GERD (gastroesophageal reflux disease)     For the last 15+ yrs as of 2017.     History of blood transfusion 2017    anemia    Hyperlipidemia     IBS (irritable bowel syndrome)     with long-pattern of ~4 loose BMs per day, increased to 5-6/day in the latter half of 2017    DAJUAN on CPAP        Past Surgical History:   Procedure Laterality Date    ANKLE FRACTURE SURGERY Left 2/3/2021    LEFT ANKLE  OPEN REDUCTION INTERNAL FIXATION performed by Jessica Sloan MD at [address] [place] [address] Right 09/09/2014    ORIF right ankle fracture CINDY Robison MD    CHOLECYSTECTOMY, LAPAROSCOPIC N/A 11/14/2019    CHOLECYSTECTOMY LAPAROSCOPIC ROBOTIC ASSISTED POSS OPEN POSS CHOLANGIOGRAM performed by Long Escobar MD at [facility] COLONOSCOPY  12/21/2017    Done at [address] [place] [address]. Negative per pt.  ERCP N/A 9/27/2019    ERCP STONE REMOVAL performed by Jason Barclay MD at [address] [place]  12/21/2017    Done at [address] [place] [address]. Negative except for possibly a couple of spots of gastritis per pt - biopsied, results pending. History reviewed. No pertinent family history.     Medications:     Current Outpatient Medications:     doxycycline hyclate (VIBRA-TABS) 100 MG tablet, Take 1 tablet by mouth 2 times daily for 10 days, Disp: 20 tablet, Rfl: 0    dexamethasone (DECADRON) 6 MG tablet, Take 1 tablet by mouth daily (with breakfast) for 5 days, Disp: 5 tablet, Rfl: 0    albuterol sulfate  (90 Base) MCG/ACT inhaler, Inhale 2 puffs into the lungs 4 times daily as needed for Wheezing, Disp: 18 g, Rfl: 0    benzonatate (TESSALON) 100 MG capsule, Take 1 capsule by mouth 3 times daily as needed for Cough, Disp: 21 capsule, Rfl: 0    acetaminophen (TYLENOL) 500 MG tablet, Take 500 mg by mouth every 6 hours as needed for Pain Alternates tylenol with ibuprofen for back pain., Disp: , Rfl:     lipase-protease-amylase (CREON) 16954 units CPEP delayed release capsule, Take 1 capsule by mouth 3 times daily (with meals), Disp: 250 capsule, Rfl: 3    ibuprofen (ADVIL;MOTRIN) 200 MG tablet, Take 200 mg by mouth every 6 hours as needed for Pain Alternates ibuprofen with tylenol for back pain., Disp: , Rfl:     Escitalopram Oxalate (LEXAPRO PO), Take 15 mg by mouth every evening , Disp: , Rfl:     pantoprazole (PROTONIX) 40 MG tablet, Take 40 mg by mouth daily , Disp: , Rfl:     Allergies:   No Known Allergies    Social History:     Social History Tobacco Use    Smoking status: Former Smoker     Packs/day: 1.00     Years: 15.00     Pack years: 15.00     Types: Cigarettes     Quit date:      Years since quittin.0    Smokeless tobacco: Never Used   Vaping Use    Vaping Use: Never used   Substance Use Topics    Alcohol use: Not Currently     Alcohol/week: 3.0 standard drinks     Types: 3 Shots of liquor per week    Drug use: No       Patient lives at home. Physical Exam:     Vitals:    21 1636   BP: (!) 148/88   Pulse: 96   Temp: 97.6 °F (36.4 °C)   SpO2: 98%   Weight: 197 lb (89.4 kg)       Exam:  Physical Exam  Nurse's notes and vital signs reviewed. The patient is not hypoxic. ? General: Alert, no acute distress, patient resting comfortably Patient is not toxic or lethargic. Skin: Warm, intact, no pallor noted. There is no evidence of rash at this time. Head: Normocephalic, atraumatic  Eye: Normal conjunctiva  Ears, Nose, Throat: Right tympanic membrane clear, left tympanic membrane clear. No drainage or discharge noted. No pre- or post-auricular tenderness, erythema, or swelling noted. Nasal congestion, rhinorrhea. Posterior oropharynx shows erythema and cobblestoning but no evidence of tonsillar hypertrophy, or exudate. the uvula is midline. No trismus or drooling is noted. Moist mucous membranes. Cardiovascular: Regular Rate and Rhythm  Respiratory: No acute distress, mild expiratory wheezing, no significant rhonchi or crackles. No stridor or retractions are noted. Neurological: A&O x4, normal speech  Psychiatric: Cooperative         Testing:     Results for orders placed or performed in visit on 21   POCT COVID-19, Antigen   Result Value Ref Range    SARS-COV-2, POC Detected (A) Not Detected    Lot Number [id number]     QC Pass/Fail pass     Performing Instrument [employer]            Medical Decision Making:     Vital signs reviewed    Past medical history reviewed. Allergies reviewed.     Medications benzonatate (TESSALON) 100 MG capsule; Take 1 capsule by mouth 3 times daily as needed for Cough        The patient is to call for any concerns or return if any of the signs or symptoms worsen. The patient is to follow-up with PCP in the next 2-3 days for repeat evaluation repeat assessment or go directly to the emergency department.      SIGNATURE: Earle Estevez III, PA-C

## 2022-02-23 ENCOUNTER — TELEPHONE (OUTPATIENT)
Dept: HEMATOLOGY | Age: 42
End: 2022-02-23

## 2022-02-23 NOTE — TELEPHONE ENCOUNTER
Spoke to [place] about his scheduled Mri on 3/9/22. Pt confirmed date and time and agreed to fast for 6-8 hours prior to MRI. Gave number to [place] for radiology scheduling because he was unsure this date would work for him. Pt understood to call office back once finalized so that he may schedule a f/u with Dr. Heber Mosley to review results.

## 2022-03-14 ENCOUNTER — HOSPITAL ENCOUNTER (OUTPATIENT)
Dept: MRI IMAGING | Age: 42
Discharge: HOME | End: 2022-03-16

## 2022-03-14 DIAGNOSIS — K86.3 PANCREATIC PSEUDOCYST: ICD-10-CM

## 2022-03-14 DIAGNOSIS — K85.91 NECROTIZING PANCREATITIS: ICD-10-CM

## 2022-04-19 ENCOUNTER — TELEPHONE (OUTPATIENT)
Dept: HEMATOLOGY | Age: 42
End: 2022-04-19

## 2022-04-19 NOTE — TELEPHONE ENCOUNTER
The patient called and stated that the [facility] will be sending his creon order to him within the next 7 days and he is completely out of the medication. So I used one of our coupons from [employer] for 56 free capsules and called the following script in    Creon 36,000 unit capsules  Take 1 capsule by mouth three times daily (with meals)  #56   No refills    I also reminded the patient that he has to reschedule his mri and call our office for follow up. The patients labs are scanned into his chart.  The patient verbalized understanding  Electronically signed by Dominic Dexter MA on 4/19/2022 at 12:17 PM

## 2022-05-09 RX ORDER — PANCRELIPASE 36000; 180000; 114000 [USP'U]/1; [USP'U]/1; [USP'U]/1
CAPSULE, DELAYED RELEASE PELLETS ORAL
Qty: 90 CAPSULE | Refills: 5 | Status: SHIPPED | OUTPATIENT
Start: 2022-05-09 | End: 2022-06-08

## 2023-04-03 ENCOUNTER — APPOINTMENT (OUTPATIENT)
Dept: GENERAL RADIOLOGY | Age: 43
End: 2023-04-03
Payer: COMMERCIAL

## 2023-04-03 ENCOUNTER — HOSPITAL ENCOUNTER (EMERGENCY)
Age: 43
Discharge: HOME | End: 2023-04-03
Attending: STUDENT IN AN ORGANIZED HEALTH CARE EDUCATION/TRAINING PROGRAM | Admitting: STUDENT IN AN ORGANIZED HEALTH CARE EDUCATION/TRAINING PROGRAM
Payer: COMMERCIAL

## 2023-04-03 ENCOUNTER — APPOINTMENT (OUTPATIENT)
Dept: CT IMAGING | Age: 43
End: 2023-04-03
Payer: COMMERCIAL

## 2023-04-03 VITALS
TEMPERATURE: 98 F | WEIGHT: 190 LBS | BODY MASS INDEX: 26.5 KG/M2 | HEART RATE: 69 BPM | OXYGEN SATURATION: 97 % | SYSTOLIC BLOOD PRESSURE: 156 MMHG | DIASTOLIC BLOOD PRESSURE: 75 MMHG | RESPIRATION RATE: 20 BRPM

## 2023-04-03 DIAGNOSIS — S82.891A CLOSED FRACTURE OF RIGHT ANKLE, INITIAL ENCOUNTER: Primary | ICD-10-CM

## 2023-04-03 LAB
ALBUMIN SERPL-MCNC: 4.5 G/DL (ref 3.5–5.2)
ALP SERPL-CCNC: 53 U/L (ref 40–129)
ALT SERPL-CCNC: 21 U/L (ref 0–40)
ANION GAP SERPL CALCULATED.3IONS-SCNC: 14 MMOL/L (ref 7–16)
AST SERPL-CCNC: 28 U/L (ref 0–39)
BASOPHILS # BLD: 0.05 E9/L (ref 0–0.2)
BASOPHILS NFR BLD: 0.4 % (ref 0–2)
BILIRUB SERPL-MCNC: 0.4 MG/DL (ref 0–1.2)
BUN SERPL-MCNC: 10 MG/DL (ref 6–20)
CALCIUM SERPL-MCNC: 9.1 MG/DL (ref 8.6–10.2)
CHLORIDE SERPL-SCNC: 103 MMOL/L (ref 98–107)
CO2 SERPL-SCNC: 25 MMOL/L (ref 22–29)
CREAT SERPL-MCNC: 1 MG/DL (ref 0.7–1.2)
EOSINOPHIL # BLD: 0.03 E9/L (ref 0.05–0.5)
EOSINOPHIL NFR BLD: 0.2 % (ref 0–6)
ERYTHROCYTE [DISTWIDTH] IN BLOOD BY AUTOMATED COUNT: 12.9 FL (ref 11.5–15)
GLUCOSE SERPL-MCNC: 118 MG/DL (ref 74–99)
HCT VFR BLD AUTO: 45 % (ref 37–54)
HGB BLD-MCNC: 14.9 G/DL (ref 12.5–16.5)
IMM GRANULOCYTES # BLD: 0.16 E9/L
IMM GRANULOCYTES NFR BLD: 1.3 % (ref 0–5)
INR BLD: 1
LYMPHOCYTES # BLD: 1.01 E9/L (ref 1.5–4)
LYMPHOCYTES NFR BLD: 8.1 % (ref 20–42)
MCH RBC QN AUTO: 31.6 PG (ref 26–35)
MCHC RBC AUTO-ENTMCNC: 33.1 % (ref 32–34.5)
MCV RBC AUTO: 95.3 FL (ref 80–99.9)
MONOCYTES # BLD: 0.95 E9/L (ref 0.1–0.95)
MONOCYTES NFR BLD: 7.6 % (ref 2–12)
NEUTROPHILS # BLD: 10.32 E9/L (ref 1.8–7.3)
NEUTS SEG NFR BLD: 82.4 % (ref 43–80)
PLATELET # BLD AUTO: 250 E9/L (ref 130–450)
PMV BLD AUTO: 10.1 FL (ref 7–12)
POTASSIUM SERPL-SCNC: 3.8 MMOL/L (ref 3.5–5)
PROT SERPL-MCNC: 7 G/DL (ref 6.4–8.3)
PROTHROMBIN TIME: 11.3 SEC (ref 9.3–12.4)
RBC # BLD AUTO: 4.72 E12/L (ref 3.8–5.8)
SODIUM SERPL-SCNC: 142 MMOL/L (ref 132–146)
WBC # BLD: 12.5 E9/L (ref 4.5–11.5)

## 2023-04-03 PROCEDURE — 6370000000 HC RX 637 (ALT 250 FOR IP): Performed by: STUDENT IN AN ORGANIZED HEALTH CARE EDUCATION/TRAINING PROGRAM

## 2023-04-03 PROCEDURE — 73560 X-RAY EXAM OF KNEE 1 OR 2: CPT

## 2023-04-03 PROCEDURE — 76376 3D RENDER W/INTRP POSTPROCES: CPT

## 2023-04-03 PROCEDURE — 90471 IMMUNIZATION ADMIN: CPT | Performed by: STUDENT IN AN ORGANIZED HEALTH CARE EDUCATION/TRAINING PROGRAM

## 2023-04-03 PROCEDURE — 73700 CT LOWER EXTREMITY W/O DYE: CPT

## 2023-04-03 PROCEDURE — 2580000003 HC RX 258: Performed by: STUDENT IN AN ORGANIZED HEALTH CARE EDUCATION/TRAINING PROGRAM

## 2023-04-03 PROCEDURE — 6360000002 HC RX W HCPCS: Performed by: STUDENT IN AN ORGANIZED HEALTH CARE EDUCATION/TRAINING PROGRAM

## 2023-04-03 PROCEDURE — 99285 EMERGENCY DEPT VISIT HI MDM: CPT

## 2023-04-03 PROCEDURE — 73610 X-RAY EXAM OF ANKLE: CPT

## 2023-04-03 PROCEDURE — C9113 INJ PANTOPRAZOLE SODIUM, VIA: HCPCS | Performed by: STUDENT IN AN ORGANIZED HEALTH CARE EDUCATION/TRAINING PROGRAM

## 2023-04-03 PROCEDURE — 96375 TX/PRO/DX INJ NEW DRUG ADDON: CPT

## 2023-04-03 PROCEDURE — 85025 COMPLETE CBC W/AUTO DIFF WBC: CPT

## 2023-04-03 PROCEDURE — 96372 THER/PROPH/DIAG INJ SC/IM: CPT

## 2023-04-03 PROCEDURE — A4216 STERILE WATER/SALINE, 10 ML: HCPCS | Performed by: STUDENT IN AN ORGANIZED HEALTH CARE EDUCATION/TRAINING PROGRAM

## 2023-04-03 PROCEDURE — 2500000003 HC RX 250 WO HCPCS: Performed by: STUDENT IN AN ORGANIZED HEALTH CARE EDUCATION/TRAINING PROGRAM

## 2023-04-03 PROCEDURE — 6360000002 HC RX W HCPCS: Performed by: PHYSICIAN ASSISTANT

## 2023-04-03 PROCEDURE — 80053 COMPREHEN METABOLIC PANEL: CPT

## 2023-04-03 PROCEDURE — 96374 THER/PROPH/DIAG INJ IV PUSH: CPT

## 2023-04-03 PROCEDURE — 85610 PROTHROMBIN TIME: CPT

## 2023-04-03 PROCEDURE — 90714 TD VACC NO PRESV 7 YRS+ IM: CPT | Performed by: STUDENT IN AN ORGANIZED HEALTH CARE EDUCATION/TRAINING PROGRAM

## 2023-04-03 RX ORDER — MORPHINE SULFATE 4 MG/ML
4 INJECTION, SOLUTION INTRAMUSCULAR; INTRAVENOUS ONCE
Status: COMPLETED | OUTPATIENT
Start: 2023-04-03 | End: 2023-04-03

## 2023-04-03 RX ORDER — HYDROCODONE BITARTRATE AND ACETAMINOPHEN 5; 325 MG/1; MG/1
1 TABLET ORAL EVERY 8 HOURS PRN
Qty: 9 TABLET | Refills: 0 | Status: SHIPPED | OUTPATIENT
Start: 2023-04-03 | End: 2023-04-05

## 2023-04-03 RX ORDER — FENTANYL CITRATE 50 UG/ML
25 INJECTION, SOLUTION INTRAMUSCULAR; INTRAVENOUS ONCE
Status: COMPLETED | OUTPATIENT
Start: 2023-04-03 | End: 2023-04-03

## 2023-04-03 RX ORDER — LIDOCAINE HYDROCHLORIDE 10 MG/ML
20 INJECTION, SOLUTION INFILTRATION; PERINEURAL ONCE
Status: DISCONTINUED | OUTPATIENT
Start: 2023-04-03 | End: 2023-04-03 | Stop reason: HOSPADM

## 2023-04-03 RX ORDER — FENTANYL CITRATE 50 UG/ML
50 INJECTION, SOLUTION INTRAMUSCULAR; INTRAVENOUS ONCE
Status: COMPLETED | OUTPATIENT
Start: 2023-04-03 | End: 2023-04-03

## 2023-04-03 RX ORDER — PROPOFOL 10 MG/ML
200 INJECTION, EMULSION INTRAVENOUS ONCE
Status: COMPLETED | OUTPATIENT
Start: 2023-04-03 | End: 2023-04-03

## 2023-04-03 RX ORDER — KETAMINE HYDROCHLORIDE 10 MG/ML
100 INJECTION INTRAMUSCULAR; INTRAVENOUS ONCE
Status: COMPLETED | OUTPATIENT
Start: 2023-04-03 | End: 2023-04-03

## 2023-04-03 RX ORDER — PROPOFOL 10 MG/ML
100 INJECTION, EMULSION INTRAVENOUS ONCE
Status: DISCONTINUED | OUTPATIENT
Start: 2023-04-03 | End: 2023-04-03

## 2023-04-03 RX ORDER — PROPOFOL 10 MG/ML
5-50 INJECTION, EMULSION INTRAVENOUS ONCE
Status: DISCONTINUED | OUTPATIENT
Start: 2023-04-03 | End: 2023-04-03

## 2023-04-03 RX ORDER — TETANUS AND DIPHTHERIA TOXOIDS ADSORBED 2; 2 [LF]/.5ML; [LF]/.5ML
0.5 INJECTION INTRAMUSCULAR ONCE
Status: COMPLETED | OUTPATIENT
Start: 2023-04-03 | End: 2023-04-03

## 2023-04-03 RX ORDER — HYDROCODONE BITARTRATE AND ACETAMINOPHEN 5; 325 MG/1; MG/1
1 TABLET ORAL ONCE
Status: COMPLETED | OUTPATIENT
Start: 2023-04-03 | End: 2023-04-03

## 2023-04-03 RX ADMIN — KETAMINE HYDROCHLORIDE 100 MG: 10 INJECTION, SOLUTION INTRAMUSCULAR; INTRAVENOUS at 18:00

## 2023-04-03 RX ADMIN — FENTANYL CITRATE 50 MCG: 50 INJECTION, SOLUTION INTRAMUSCULAR; INTRAVENOUS at 16:44

## 2023-04-03 RX ADMIN — HYDROCODONE BITARTRATE AND ACETAMINOPHEN 1 TABLET: 5; 325 TABLET ORAL at 21:32

## 2023-04-03 RX ADMIN — FENTANYL CITRATE 25 MCG: 50 INJECTION, SOLUTION INTRAMUSCULAR; INTRAVENOUS at 15:22

## 2023-04-03 RX ADMIN — SODIUM CHLORIDE 40 MG: 9 INJECTION INTRAMUSCULAR; INTRAVENOUS; SUBCUTANEOUS at 16:46

## 2023-04-03 RX ADMIN — TETANUS AND DIPHTHERIA TOXOIDS ADSORBED 0.5 ML: 2; 2 INJECTION INTRAMUSCULAR at 16:49

## 2023-04-03 RX ADMIN — MORPHINE SULFATE 4 MG: 4 INJECTION, SOLUTION INTRAMUSCULAR; INTRAVENOUS at 19:55

## 2023-04-03 RX ADMIN — CEFAZOLIN 2000 MG: 2 INJECTION, POWDER, FOR SOLUTION INTRAMUSCULAR; INTRAVENOUS at 16:48

## 2023-04-03 RX ADMIN — PROPOFOL 200 MG: 10 INJECTION, EMULSION INTRAVENOUS at 18:00

## 2023-04-03 ASSESSMENT — PAIN DESCRIPTION - DESCRIPTORS: DESCRIPTORS: ACHING;DISCOMFORT;PRESSURE;SHARP

## 2023-04-03 ASSESSMENT — PAIN SCALES - GENERAL
PAINLEVEL_OUTOF10: 7
PAINLEVEL_OUTOF10: 6
PAINLEVEL_OUTOF10: 4
PAINLEVEL_OUTOF10: 6

## 2023-04-03 ASSESSMENT — ENCOUNTER SYMPTOMS
COUGH: 0
PHOTOPHOBIA: 0
ABDOMINAL PAIN: 0
NAUSEA: 0
CHEST TIGHTNESS: 0
DIARRHEA: 0
VOMITING: 0
ABDOMINAL DISTENTION: 0
SHORTNESS OF BREATH: 0
BACK PAIN: 0

## 2023-04-03 ASSESSMENT — LIFESTYLE VARIABLES
HOW OFTEN DO YOU HAVE A DRINK CONTAINING ALCOHOL: 2-3 TIMES A WEEK
HOW MANY STANDARD DRINKS CONTAINING ALCOHOL DO YOU HAVE ON A TYPICAL DAY: 1 OR 2

## 2023-04-03 ASSESSMENT — PAIN DESCRIPTION - LOCATION
LOCATION: ANKLE

## 2023-04-03 ASSESSMENT — PAIN DESCRIPTION - ORIENTATION
ORIENTATION: RIGHT

## 2023-04-03 NOTE — CONSULTS
Department of Orthopedic Trauma Surgery  Resident consult note      CHIEF COMPLAINT:   Chief Complaint   Patient presents with    Ankle Pain     Right sided ankle pain- tripped on uneven pavement. HISTORY OF PRESENT ILLNESS:                Patient is a 38 y.o. male who presents with right ankle pain after mechanical fall from standing height today. Patient states he was ambulating when he tripped on the sidewalk and twisted his ankle subsequently falling. He had immediate pain and deformity to the right ankle and was unable to ambulate. He notes paresthesias about the foot. He originally presented to a walk-in clinic however left without being seen after a prolonged wait. He is status post ORIF right lateral malleolus and 2021. Denies any other orthopedic complaints at this time. Past Medical History:        Diagnosis Date    Anemia 11/2014    GERD (gastroesophageal reflux disease)     For the last 15+ yrs as of 2017. History of blood transfusion 12/2017    anemia    Hyperlipidemia     IBS (irritable bowel syndrome)     with long-pattern of ~4 loose BMs per day, increased to 5-6/day in the latter half of 2017    DAJUAN on CPAP      Past Surgical History:        Procedure Laterality Date    ANKLE FRACTURE SURGERY Left 2/3/2021    LEFT ANKLE  OPEN REDUCTION INTERNAL FIXATION performed by Mariam Awad MD at [place] [id number] Right 09/09/2014    ORIF right ankle fracture R. Zoran Soto MD    CHOLECYSTECTOMY, LAPAROSCOPIC N/A 11/14/2019    CHOLECYSTECTOMY LAPAROSCOPIC ROBOTIC ASSISTED POSS OPEN POSS CHOLANGIOGRAM performed by Humera Kidd MD at [address] [place] [address]  12/21/2017    Done at [address] [place] [address]. Negative per pt. ERCP N/A 9/27/2019    ERCP STONE REMOVAL performed by Migdalia Bob MD at [place] [id number]  12/21/2017    Done at [address] [place] [address].  Negative except for possibly a couple of spots of gastritis per pt -

## 2023-04-03 NOTE — ED PROVIDER NOTES
range of motion and neck supple. No rigidity. No muscular tenderness. Right lower leg: No edema. Left lower leg: No edema. Skin:     General: Skin is warm and dry. Capillary Refill: Capillary refill takes less than 2 seconds. Coloration: Skin is not pale. Findings: No erythema or rash. Neurological:      Mental Status: He is alert and oriented to person, place, and time. Psychiatric:         Mood and Affect: Mood normal.        Procedures   Conscious Sedation Procedure Note    Indication: fracture dislocation    Consent: I have discussed with the patient and/or the patient representative the indication, alternatives, and the possible risks and/or complications of the planned procedure and the anesthesia methods. The patient and/or patient representative appear to understand and agree to proceed. Physician Involvement: The attending physician was present and supervising this procedure. Pre-Sedation Documentation and Exam:  Time: 4226  I have personally completed a history, physical exam & review of systems for this patient (see notes). Airway Assessment: normal    Prior History of Anesthesia Complications: none    ASA Classification: Class 2 - A normal healthy patient with mild systemic disease    Sedation/ Anesthesia Plan: intravenous sedation    Medications Used: ketamine intravenously and propofol intravenously    Monitoring and Safety: The patient was placed on a cardiac monitor and vital signs, pulse oximetry and level of consciousness were continuously evaluated throughout the procedure. The patient was closely monitored until recovery from the medications was complete and the patient had returned to baseline status. Respiratory therapy was on standby at all times during the procedure.     (The following sections must be completed)  Post-Sedation Vital Signs: Vital signs were reviewed and were stable after the procedure (see flow sheet for vitals)            Post-Sedation

## 2023-04-05 ENCOUNTER — OFFICE VISIT (OUTPATIENT)
Dept: ORTHOPEDIC SURGERY | Age: 43
End: 2023-04-05

## 2023-04-05 VITALS — HEIGHT: 71 IN | WEIGHT: 190 LBS | BODY MASS INDEX: 26.6 KG/M2

## 2023-04-05 DIAGNOSIS — S82.891A CLOSED FRACTURE OF RIGHT ANKLE, INITIAL ENCOUNTER: Primary | ICD-10-CM

## 2023-04-05 DIAGNOSIS — S82.62XD CLOSED DISPLACED FRACTURE OF LATERAL MALLEOLUS OF LEFT FIBULA WITH ROUTINE HEALING, SUBSEQUENT ENCOUNTER: ICD-10-CM

## 2023-04-05 RX ORDER — OXYCODONE HYDROCHLORIDE AND ACETAMINOPHEN 5; 325 MG/1; MG/1
1 TABLET ORAL EVERY 6 HOURS PRN
Qty: 28 TABLET | Refills: 0 | Status: SHIPPED | OUTPATIENT
Start: 2023-04-05 | End: 2023-04-12

## 2023-04-05 NOTE — LETTER
Elisabeth Frias, DCharuO.   [address]       Ramakrishna Boykin, [address] [place] [address]    Surgery Date:                              4/11/2023                                    Regarding Prescription & Non-Prescription Medications:    Medication Name to Discontinue:   Days before surgery  Last dose  Advil (Ibuprofen)      2   4/8/2023                                                                                                                                                                                                                                                                                                                                                                                                                                                                                                                                                                                                                                                                                                                                                                                                                                                                                                                                                                                                                                                                                                                                                                                                                                                                                                                                             ** All medications discontinued prior to surgery may be resumed after your surgery is completed, unless otherwise specified by the physician.     If you have any questions or concerns, please contact our nurse at [phone], Option 2:  Monday through Thursday 9:00 am -

## 2023-04-05 NOTE — PROGRESS NOTES
biopsied, results pending. FAMILY HISTORY   History reviewed. No pertinent family history. SOCIAL HISTORY  Social History     Socioeconomic History    Marital status:      Spouse name: Not on file    Number of children: Not on file    Years of education: Not on file    Highest education level: Not on file   Occupational History    Not on file   Tobacco Use    Smoking status: Every Day     Packs/day: 0.50     Years: 15.00     Pack years: 7.50     Types: Cigarettes     Last attempt to quit:      Years since quittin.2    Smokeless tobacco: Never   Vaping Use    Vaping Use: Never used   Substance and Sexual Activity    Alcohol use: Yes     Alcohol/week: 3.0 standard drinks     Types: 3 Shots of liquor per week    Drug use: No    Sexual activity: Not on file   Other Topics Concern    Not on file   Social History Narrative    Not on file     Social Determinants of Health     Financial Resource Strain: Not on file   Food Insecurity: Not on file   Transportation Needs: Not on file   Physical Activity: Not on file   Stress: Not on file   Social Connections: Not on file   Intimate Partner Violence: Not on file   Housing Stability: Not on file     Social History     Occupational History    Not on file   Tobacco Use    Smoking status: Every Day     Packs/day: 0.50     Years: 15.00     Pack years: 7.50     Types: Cigarettes     Last attempt to quit:      Years since quittin.2    Smokeless tobacco: Never   Vaping Use    Vaping Use: Never used   Substance and Sexual Activity    Alcohol use: Yes     Alcohol/week: 3.0 standard drinks     Types: 3 Shots of liquor per week    Drug use: No    Sexual activity: Not on file       CURRENT MEDICATIONS     Current Outpatient Medications:     HYDROcodone-acetaminophen (NORCO) 5-325 MG per tablet, Take 1 tablet by mouth every 8 hours as needed for Pain for up to 3 days. Intended supply: 3 days.  Take lowest dose possible to manage pain Max Daily Amount: 3

## 2023-04-26 ENCOUNTER — OFFICE VISIT (OUTPATIENT)
Dept: ORTHOPEDIC SURGERY | Age: 43
End: 2023-04-26

## 2023-04-26 VITALS — BODY MASS INDEX: 26.6 KG/M2 | HEIGHT: 71 IN | WEIGHT: 190 LBS

## 2023-04-26 DIAGNOSIS — S82.62XD CLOSED DISPLACED FRACTURE OF LATERAL MALLEOLUS OF LEFT FIBULA WITH ROUTINE HEALING, SUBSEQUENT ENCOUNTER: ICD-10-CM

## 2023-04-26 DIAGNOSIS — S82.891A CLOSED FRACTURE OF RIGHT ANKLE, INITIAL ENCOUNTER: Primary | ICD-10-CM

## 2023-04-26 NOTE — PROGRESS NOTES
A fiberglass short leg cast was applied to His Right leg. Neurovascular status was checked pre and post application. Patient was neurovascularly intact after the application process. The patient denied any issues with fit or comfort of the cast/splint. advised to avoid activities that put them at risk for falling. Patient instructed to call our office if there are any issues with the cast/splint.

## 2023-04-26 NOTE — PROGRESS NOTES
Follow Up Post Operative Visit     Surgery: Right ankle open reduction internal fixation with removal of hardware and syndesmotic fixation  Date: 4/11/2023    Subjective:    Bradley Romero is here for follow up visit s/p above procedure. He has been doing well. Has been compliant with his nonweightbearing. Denies fever and chills. Denies numbness tingling. Pain is well controlled. He is taking aspirin for DVT prophylaxis. Controlled Substances Monitoring:        Physical Exam:    Height: 5' 11\" (1.803 m), Weight: 190 lb (86.2 kg)    General: Alert and oriented x3, no acute distress  Cardiovascular/pulmonary: No labored breathing, peripheral perfusion intact  Musculoskeletal:    Right ankle: Incision well approximated sutures removed no signs of drainage or infection. Steri-Strips placed. Sensation is intact sural saphenous superficial peroneal deep peroneal tibial nerve distribution. He has limited ankle plantar dorsiflexion due to pain and immobilization. Calf soft compressible. Foot warm well perfused. Imaging: 3 views of the right ankle demonstrate open duction internal fixation of high fibula fracture with Trans-syndesmotic fixation. Good alignment of the mortise with no evidence of complication    Assessment and Plan: 2 weeks status post open reduction internal fixation right ankle periimplant fracture and syndesmotic disruption  -Long discussion with the patient on his prognosis and healing. First is to get the fibula fracture to heal.  Take about 12 weeks for the syndesmotic ligaments to heal themselves. He will have to be nonweightbearing for that time. We will place him in a short leg cast today. I want him on aspirin for DVT prophylaxis for the duration of his nonweightbearing and immobilization. He will call for refill after 30 days when he runs out. We did talk about syndesmotic screw removal after 3 months if they are symptomatic or he does not regain full dorsiflexion.   He is

## 2023-05-24 ENCOUNTER — OFFICE VISIT (OUTPATIENT)
Dept: ORTHOPEDIC SURGERY | Age: 43
End: 2023-05-24

## 2023-05-24 VITALS — WEIGHT: 190 LBS | BODY MASS INDEX: 26.6 KG/M2 | HEIGHT: 71 IN

## 2023-05-24 DIAGNOSIS — S82.62XD CLOSED DISPLACED FRACTURE OF LATERAL MALLEOLUS OF LEFT FIBULA WITH ROUTINE HEALING, SUBSEQUENT ENCOUNTER: Primary | ICD-10-CM

## 2023-05-24 PROCEDURE — 99024 POSTOP FOLLOW-UP VISIT: CPT | Performed by: STUDENT IN AN ORGANIZED HEALTH CARE EDUCATION/TRAINING PROGRAM

## 2023-05-24 RX ORDER — DEXTROAMPHETAMINE/AMPHETAMINE 10 MG
CAPSULE, EXT RELEASE 24 HR ORAL DAILY
COMMUNITY
Start: 2023-05-04

## 2023-05-24 RX ORDER — ASPIRIN 325 MG
325 TABLET, DELAYED RELEASE (ENTERIC COATED) ORAL 2 TIMES DAILY
Qty: 56 TABLET | Refills: 0 | Status: SHIPPED | OUTPATIENT
Start: 2023-05-24 | End: 2023-06-21

## 2023-05-24 NOTE — PROGRESS NOTES
Aircast fitted to left ankle.
he ambulates too early he is at risk of syndesmotic disruption along with failure of his fibula fracture fixation. He understands and wishes to proceed. I will see him back in 4 weeks to repeat images and see how he is doing. All of his questions answered in detail.     Shauna Prieto DO   Orthopaedic Surgery   5/24/23  8:58 AM

## 2023-06-21 ENCOUNTER — OFFICE VISIT (OUTPATIENT)
Dept: ORTHOPEDIC SURGERY | Age: 43
End: 2023-06-21

## 2023-06-21 DIAGNOSIS — S82.62XD CLOSED DISPLACED FRACTURE OF LATERAL MALLEOLUS OF LEFT FIBULA WITH ROUTINE HEALING, SUBSEQUENT ENCOUNTER: Primary | ICD-10-CM

## 2023-06-21 NOTE — PROGRESS NOTES
Follow Up Post Operative Visit     Surgery: Right ankle open reduction internal fixation with removal of hardware and syndesmotic fixation  Date: 4/11/2023    Subjective:    Louis Price is here for follow up visit s/p above procedure. He has been doing well. His pain is well controlled. He continues to be noncompliant with his weightbearing status. He is about 10 weeks out from surgery. His pain is well controlled. He is doing very well. He is happy with his result    Controlled Substances Monitoring:        Physical Exam:    No data recorded    General: Alert and oriented x3, no acute distress  Cardiovascular/pulmonary: No labored breathing, peripheral perfusion intact  Musculoskeletal:    Right ankle: Incision well approximated with good scar formation. No signs of infection. Sensation is intact sural saphenous superficial peroneal deep peroneal tibial nerve distribution. H ankle plantar and dorsiflexion of improved greatly. Calf soft compressible. Foot warm well perfused. Imaging: 3 views of the right ankle demonstrate open reduction internal fixation of high fibula fracture with Trans-syndesmotic fixation. Good alignment of the mortise with no evidence of complication. He does not have union of his fibula fracture with still visible fracture line. His mortise remains symmetric. There is some interval callus formation of the fibula but is incompletely united at this point. Assessment and Plan: 10 weeks status post open reduction internal fixation right ankle periimplant fracture and syndesmotic disruption  -We had another long discussion about his treatment and prognosis. I usually recommend 12 weeks of nonweightbearing on the syndesmotic injury like this with a high fibula fracture. He continues to be noncompliant with weightbearing. At this point it is likely not going to fail since he has been walking on this since about a month out from surgery.   He can discontinue his aspirin

## 2023-08-03 ENCOUNTER — OFFICE VISIT (OUTPATIENT)
Dept: ORTHOPEDIC SURGERY | Age: 43
End: 2023-08-03

## 2023-08-03 DIAGNOSIS — S82.891A CLOSED FRACTURE OF RIGHT ANKLE, INITIAL ENCOUNTER: Primary | ICD-10-CM

## 2023-08-03 NOTE — PROGRESS NOTES
Follow Up Post Operative Visit     Surgery: Right ankle open reduction internal fixation with removal of hardware and syndesmotic fixation  Date: 4/11/2023    Subjective:    Aure Franco is here for follow up visit s/p above procedure. Controlled Substances Monitoring:        Physical Exam:    No data recorded    General: Alert and oriented x3, no acute distress  Cardiovascular/pulmonary: No labored breathing, peripheral perfusion intact  Musculoskeletal:    Right ankle: Incision well approximated with good scar formation. No signs of infection. Sensation is intact sural saphenous superficial peroneal deep peroneal tibial nerve distribution. H ankle plantar and dorsiflexion of improved greatly. Calf soft compressible. Foot warm well perfused. Imaging: 3 views of the right ankle independently turbid by myself discussed the patient. He is developing a nonunion of his proximal fibula fracture. There is still no evidence of callus formation. His syndesmosis screws are backing out he has medial clear space widening of mortise asymmetry. This is consistent with failure of his syndesmosis repair. Assessment and Plan: 4 months status post open reduction internal fixation right ankle periimplant fracture and syndesmotic disruption  - unfortunately he is starting to fail this revision ankle fracture surgery. He had been noncompliant with his weightbearing and has not healed his fibula fracture. We had a long discussion about his injury and his hardware failing. I think this is going to require revision open reduction internal fixation. I will revise his fibula plate and additional mini frag plate anterior on the fibula along with some bone graft. I will also need to revise his syndesmosis fixation with may be flexible fixation in the form of a tight rope. He will need to be compliant in full nonweightbearing for at least 12 weeks after the surgery.     Patient has no money and inability to stop

## 2023-09-20 ENCOUNTER — OFFICE VISIT (OUTPATIENT)
Dept: ORTHOPEDIC SURGERY | Age: 43
End: 2023-09-20
Payer: COMMERCIAL

## 2023-09-20 VITALS — BODY MASS INDEX: 26.6 KG/M2 | HEIGHT: 71 IN | WEIGHT: 190 LBS

## 2023-09-20 DIAGNOSIS — S82.891A CLOSED FRACTURE OF RIGHT ANKLE, INITIAL ENCOUNTER: Primary | ICD-10-CM

## 2023-09-20 PROCEDURE — G8427 DOCREV CUR MEDS BY ELIG CLIN: HCPCS | Performed by: STUDENT IN AN ORGANIZED HEALTH CARE EDUCATION/TRAINING PROGRAM

## 2023-09-20 PROCEDURE — 4004F PT TOBACCO SCREEN RCVD TLK: CPT | Performed by: STUDENT IN AN ORGANIZED HEALTH CARE EDUCATION/TRAINING PROGRAM

## 2023-09-20 PROCEDURE — 99213 OFFICE O/P EST LOW 20 MIN: CPT | Performed by: STUDENT IN AN ORGANIZED HEALTH CARE EDUCATION/TRAINING PROGRAM

## 2023-09-20 PROCEDURE — G8419 CALC BMI OUT NRM PARAM NOF/U: HCPCS | Performed by: STUDENT IN AN ORGANIZED HEALTH CARE EDUCATION/TRAINING PROGRAM

## 2023-09-20 NOTE — PROGRESS NOTES
Follow Up Post Operative Visit     Surgery: Right ankle open reduction internal fixation with removal of hardware and syndesmotic fixation  Date: 4/11/2023    Subjective:    Jadon Anderson is here for follow up visit s/p above procedure. He is doing well. He describes his pain at baseline is 2-3 out of 10 and a chronic dull ache. He is back to work without restriction. He has not had evidence of catastrophic failure yet. Controlled Substances Monitoring:        Physical Exam:    Height: 5' 11\" (1.803 m), Weight - Scale: 190 lb (86.2 kg)    General: Alert and oriented x3, no acute distress  Cardiovascular/pulmonary: No labored breathing, peripheral perfusion intact  Musculoskeletal:    Right ankle: Incision well approximated with good scar formation. No signs of infection. Sensation is intact sural saphenous superficial peroneal deep peroneal tibial nerve distribution. H ankle plantar and dorsiflexion of improved greatly. Calf soft compressible. Foot warm well perfused. Imaging: 3 views of the right ankle independently turbid by myself discussed the patient. He has yet to achieve complete union of his fibula fracture. Syndesmotic screws are loosening with lucency around them in the tibia. The medial joint space continues to be wide along with the syndesmosis. This is advanced since his previous x-rays at the beginning of August    Assessment and Plan: 5 months status post open reduction internal fixation right ankle periimplant fracture and syndesmotic disruption  -Unfortunately he is continuing to have failure of his syndesmosis repair. He has not healed his fibula. He is approaching a nonunion is at risk of hardware failure with catastrophic ankle fracture dislocation. Patient has no money and inability to stop working. I explained to him that this could be a limb threatening injury if this plate failed and he dislocated his syndesmosis again.   I also explained that he can develop

## 2023-10-23 ENCOUNTER — OFFICE VISIT (OUTPATIENT)
Dept: ORTHOPEDIC SURGERY | Age: 43
End: 2023-10-23
Payer: COMMERCIAL

## 2023-10-23 DIAGNOSIS — S82.891A CLOSED FRACTURE OF RIGHT ANKLE, INITIAL ENCOUNTER: Primary | ICD-10-CM

## 2023-10-23 PROCEDURE — G8427 DOCREV CUR MEDS BY ELIG CLIN: HCPCS | Performed by: STUDENT IN AN ORGANIZED HEALTH CARE EDUCATION/TRAINING PROGRAM

## 2023-10-23 PROCEDURE — 99213 OFFICE O/P EST LOW 20 MIN: CPT | Performed by: STUDENT IN AN ORGANIZED HEALTH CARE EDUCATION/TRAINING PROGRAM

## 2023-10-23 PROCEDURE — G8419 CALC BMI OUT NRM PARAM NOF/U: HCPCS | Performed by: STUDENT IN AN ORGANIZED HEALTH CARE EDUCATION/TRAINING PROGRAM

## 2023-10-23 PROCEDURE — 4004F PT TOBACCO SCREEN RCVD TLK: CPT | Performed by: STUDENT IN AN ORGANIZED HEALTH CARE EDUCATION/TRAINING PROGRAM

## 2023-10-23 PROCEDURE — G8484 FLU IMMUNIZE NO ADMIN: HCPCS | Performed by: STUDENT IN AN ORGANIZED HEALTH CARE EDUCATION/TRAINING PROGRAM

## 2023-10-23 NOTE — PROGRESS NOTES
Follow Up Post Operative Visit     Surgery: Right ankle open reduction internal fixation with removal of hardware and syndesmotic fixation  Date: 4/11/2023    Subjective:    Tremayne Martin is here for follow up visit s/p above procedure. He is doing well. He describes his pain at baseline is 2-3 out of 10 and a chronic dull ache. He is back to work without restriction. He has not had evidence of catastrophic failure yet. His ankle feels about the same as his last visit. Denies any recent injuries. He is still working without issues. Controlled Substances Monitoring:        Physical Exam:    No data recorded    General: Alert and oriented x3, no acute distress  Cardiovascular/pulmonary: No labored breathing, peripheral perfusion intact  Musculoskeletal:    Right ankle: Incision well approximated with good scar formation. No signs of infection. Sensation is intact sural saphenous superficial peroneal deep peroneal tibial nerve distribution. H ankle plantar and dorsiflexion of improved greatly. Calf soft compressible. Foot warm well perfused. Imaging: 3 views of the right ankle independently interpreted by myself discussed the patient. He has yet to achieve complete union of his fibula fracture. Syndesmotic screws are loosening with lucency around them in the tibia. The medial joint space continues to be wide along with the syndesmosis. Syndesmosis remains wide. There is some interval callus formation at the fibula. Assessment and Plan: 6 months status post open reduction internal fixation right ankle periimplant fracture and syndesmotic disruption  -Unfortunately he is continuing to have failure of his syndesmosis repair. He has not healed his fibula. He is approaching a nonunion is at risk of hardware failure with catastrophic ankle fracture dislocation.    -He continues to develop failure of his syndesmosis repair. He continues to want to treat this conservatively for now.   He

## 2024-01-03 ENCOUNTER — OFFICE VISIT (OUTPATIENT)
Dept: ORTHOPEDIC SURGERY | Age: 44
End: 2024-01-03
Payer: COMMERCIAL

## 2024-01-03 DIAGNOSIS — S82.891A CLOSED FRACTURE OF RIGHT ANKLE, INITIAL ENCOUNTER: Primary | ICD-10-CM

## 2024-01-03 PROCEDURE — G8427 DOCREV CUR MEDS BY ELIG CLIN: HCPCS | Performed by: STUDENT IN AN ORGANIZED HEALTH CARE EDUCATION/TRAINING PROGRAM

## 2024-01-03 PROCEDURE — 99213 OFFICE O/P EST LOW 20 MIN: CPT | Performed by: STUDENT IN AN ORGANIZED HEALTH CARE EDUCATION/TRAINING PROGRAM

## 2024-01-03 PROCEDURE — G8484 FLU IMMUNIZE NO ADMIN: HCPCS | Performed by: STUDENT IN AN ORGANIZED HEALTH CARE EDUCATION/TRAINING PROGRAM

## 2024-01-03 PROCEDURE — 4004F PT TOBACCO SCREEN RCVD TLK: CPT | Performed by: STUDENT IN AN ORGANIZED HEALTH CARE EDUCATION/TRAINING PROGRAM

## 2024-01-03 PROCEDURE — G8419 CALC BMI OUT NRM PARAM NOF/U: HCPCS | Performed by: STUDENT IN AN ORGANIZED HEALTH CARE EDUCATION/TRAINING PROGRAM

## 2024-01-03 NOTE — PROGRESS NOTES
Follow Up Post Operative Visit     Surgery: Right ankle open reduction internal fixation with removal of hardware and syndesmotic fixation  Date: 4/11/2023    Subjective:    Dillan Rob is here for follow up visit s/p above procedure.  He has minimal pain and has returned to work without restrictions.  He is still not interested in pursuing surgery.  No recent changes.    Controlled Substances Monitoring:        Physical Exam:    No data recorded    General: Alert and oriented x3, no acute distress  Cardiovascular/pulmonary: No labored breathing, peripheral perfusion intact  Musculoskeletal:    Right ankle: Incision well approximated with good scar formation.  No signs of infection.  Sensation is intact sural saphenous superficial peroneal deep peroneal tibial nerve distribution.  H ankle plantar and dorsiflexion of improved greatly.   Calf soft compressible.  Foot warm well perfused.  He is ambulating without antalgia      Imaging: 3 views of the right ankle independently interpreted by myself discussed the patient.  There is evidence of syndesmotic screw loosening.  There is some more interval callus formation of the fibula but not complete union.  He still has medial space widening and some degenerative changes noted.    Assessment and Plan: 9 months status post open reduction internal fixation right ankle periimplant fracture and syndesmotic disruption  -Unfortunately he is continuing to have failure of his syndesmosis repair.  He has not healed his fibula.  He is approaching a nonunion is at risk of hardware failure with catastrophic ankle fracture dislocation.    -He continues to develop failure of his syndesmosis repair.  He continues to want to treat this conservatively for now.  He understands the risk of very poor outcome associated with not treating this effectively.  He is at high risk for developing arthritis and ankle instability.  If he develops evidence of catastrophic failure he is to go to the

## 2024-03-06 ENCOUNTER — OFFICE VISIT (OUTPATIENT)
Dept: ORTHOPEDIC SURGERY | Age: 44
End: 2024-03-06
Payer: COMMERCIAL

## 2024-03-06 VITALS — WEIGHT: 190 LBS | HEIGHT: 71 IN | BODY MASS INDEX: 26.6 KG/M2

## 2024-03-06 DIAGNOSIS — S82.891A CLOSED FRACTURE OF RIGHT ANKLE, INITIAL ENCOUNTER: Primary | ICD-10-CM

## 2024-03-06 PROCEDURE — G8484 FLU IMMUNIZE NO ADMIN: HCPCS | Performed by: STUDENT IN AN ORGANIZED HEALTH CARE EDUCATION/TRAINING PROGRAM

## 2024-03-06 PROCEDURE — G8427 DOCREV CUR MEDS BY ELIG CLIN: HCPCS | Performed by: STUDENT IN AN ORGANIZED HEALTH CARE EDUCATION/TRAINING PROGRAM

## 2024-03-06 PROCEDURE — 4004F PT TOBACCO SCREEN RCVD TLK: CPT | Performed by: STUDENT IN AN ORGANIZED HEALTH CARE EDUCATION/TRAINING PROGRAM

## 2024-03-06 PROCEDURE — G8419 CALC BMI OUT NRM PARAM NOF/U: HCPCS | Performed by: STUDENT IN AN ORGANIZED HEALTH CARE EDUCATION/TRAINING PROGRAM

## 2024-03-06 PROCEDURE — 99213 OFFICE O/P EST LOW 20 MIN: CPT | Performed by: STUDENT IN AN ORGANIZED HEALTH CARE EDUCATION/TRAINING PROGRAM

## 2024-03-06 RX ORDER — VARENICLINE TARTRATE 0.5 MG/1
0.5 TABLET, FILM COATED ORAL
COMMUNITY
Start: 2024-02-23

## 2024-03-06 RX ORDER — ACETAMINOPHEN 325 MG/1
650 TABLET ORAL DAILY
COMMUNITY
Start: 2021-06-17

## 2024-03-06 NOTE — PROGRESS NOTES
Follow Up Post Operative Visit     Surgery: Right ankle open reduction internal fixation with removal of hardware and syndesmotic fixation  Date: 4/11/2023    Subjective:    Dillan Rob is here for follow up visit s/p above procedure.  He is actually doing much better.  He says his ankle pain has improved greatly over the past few weeks.  He is still working without restriction.    Controlled Substances Monitoring:        Physical Exam:    Height: 1.803 m (5' 11\"), Weight - Scale: 86.2 kg (190 lb)    General: Alert and oriented x3, no acute distress  Cardiovascular/pulmonary: No labored breathing, peripheral perfusion intact  Musculoskeletal:    Right ankle: Incision well approximated with good scar formation.  No signs of infection.  Sensation is intact sural saphenous superficial peroneal deep peroneal tibial nerve distribution.  H ankle plantar and dorsiflexion of improved greatly.   Calf soft compressible.  Foot warm well perfused.  He is ambulating without antalgia      Imaging: 3 views of the right ankle independently interpreted by myself discussed the patient.  There is evidence of syndesmotic screw loosening.  There is some more interval callus formation of the fibula but not complete union.  He still has medial space widening and some degenerative changes noted.  Again the fibula continues to heal however he has medial clear space widening and lucency around the syndesmotic screws.    Assessment and Plan:11 months status post open reduction internal fixation right ankle periimplant fracture and syndesmotic disruption  -Unfortunately he is continuing to have failure of his syndesmosis repair.  He has not healed his fibula.  He is approaching a nonunion is at risk of hardware failure with catastrophic ankle fracture dislocation.    -I think he is healing his fibula fracture finally.  He does have continued medial clear space finding and ankle degenerative changes.  He is still not interested in revision

## 2025-03-25 PROBLEM — K44.9 HIATAL HERNIA: Status: ACTIVE | Noted: 2025-03-25

## 2025-03-26 ENCOUNTER — INITIAL CONSULT (OUTPATIENT)
Dept: SURGERY | Age: 45
End: 2025-03-26
Payer: OTHER GOVERNMENT

## 2025-03-26 VITALS
WEIGHT: 190 LBS | RESPIRATION RATE: 18 BRPM | BODY MASS INDEX: 26.6 KG/M2 | TEMPERATURE: 97.5 F | SYSTOLIC BLOOD PRESSURE: 158 MMHG | DIASTOLIC BLOOD PRESSURE: 98 MMHG | HEART RATE: 85 BPM | HEIGHT: 71 IN

## 2025-03-26 DIAGNOSIS — K44.9 HIATAL HERNIA: Primary | ICD-10-CM

## 2025-03-26 PROCEDURE — 99203 OFFICE O/P NEW LOW 30 MIN: CPT | Performed by: SURGERY

## 2025-03-26 PROCEDURE — 4004F PT TOBACCO SCREEN RCVD TLK: CPT | Performed by: SURGERY

## 2025-03-26 PROCEDURE — G8427 DOCREV CUR MEDS BY ELIG CLIN: HCPCS | Performed by: SURGERY

## 2025-03-26 PROCEDURE — G8419 CALC BMI OUT NRM PARAM NOF/U: HCPCS | Performed by: SURGERY

## 2025-03-26 NOTE — PROGRESS NOTES
General Surgery History and Physical  [facility]    Patient's Name/Date of Birth: Dillan Rob / 1980    Date: 3/26/25    Surgeon: Lefty Vela MD    PCP: Juan Mares MD     Chief Complaint: GERD, HIATAL HERNIA    HPI:   Dillan Rob is a 44 y.o. male who presents for evaluation of GERD and symptomatic hiatal hernia. Timing is intermittent with food, radiation to midline and epigastrium, alleviated by npo and started several months ago and severity is 8/10. Has been under medical management for GERD for many months, symptoms have become refractory to maximal medical therapy. Denies SOB, fever, chills, nausea, vomiting. Has had endoscopy performed to evaluate for esophagitis.     Patient Active Problem List   Diagnosis    GERD (gastroesophageal reflux disease)    Irritable bowel syndrome with diarrhea    Migraines    Pulmonary nodule RUL    Former smoker    IBS (irritable bowel syndrome)    Hyperlipidemia    Choledocholithiasis    Borderline abnormal TFTs    Gall stones, common bile duct    Calculus bile duct w/obstruction and w/o cholangitis or cholecystitis    Post-ERCP acute pancreatitis    SIRS (systemic inflammatory response syndrome) (HCC)    Pancreatic ascites    Pancreatic pseudocyst    Gallstone pancreatitis    Closed displaced fracture of lateral malleolus of left fibula    Hiatal hernia       Past Medical History:   Diagnosis Date    Anemia 11/2014    GERD (gastroesophageal reflux disease)     For the last 15+ yrs as of 2017.    History of blood transfusion 12/2017    anemia    Hyperlipidemia     IBS (irritable bowel syndrome)     with long-pattern of ~4 loose BMs per day, increased to 5-6/day in the latter half of 2017    DAJUAN on CPAP        Past Surgical History:   Procedure Laterality Date    ANKLE FRACTURE SURGERY Left 2/3/2021    LEFT ANKLE  OPEN REDUCTION INTERNAL FIXATION performed by Stan Smith MD at [facility] [place]    ANKLE FRACTURE SURGERY Right 4/11/2023

## 2025-03-27 ENCOUNTER — TELEPHONE (OUTPATIENT)
Dept: SURGERY | Age: 45
End: 2025-03-27

## 2025-03-27 NOTE — TELEPHONE ENCOUNTER
Patient to have manometry testing done, then schedule HHR with Dr. Vela.  Order faxed to ENDO for scheduling of Mano.      [facility] for treatment of HHR and all testing relating to it.  Auth scanned into media.  Electronically signed by Sophie Grimaldo on 3/27/25 at 7:48 AM EDT

## 2025-04-07 ENCOUNTER — TELEPHONE (OUTPATIENT)
Dept: ENDOSCOPY | Age: 45
End: 2025-04-07

## 2025-04-07 NOTE — TELEPHONE ENCOUNTER
Spoke to patient on 4/1/25 regarding manometry test, due to patients history he did not think he would get it done. Spoke with Dr Vela and he said he can proceed with surgery without doing manometry test. Notified patient 4/7/2025.

## 2025-04-09 ENCOUNTER — TELEPHONE (OUTPATIENT)
Dept: SURGERY | Age: 45
End: 2025-04-09

## 2025-04-10 NOTE — TELEPHONE ENCOUNTER
Per the order of Dr. Vela, patient has been scheduled for laparoscopic robotic hiatal hernia repair no fundoplication on 4.30.2025.  patient provided with procedure information over the phone and informed that written information will be mailed to him.  Patient instructed to please contact our office with any questions.    Patient also mailed dietary instructions.      Procedure scheduled through [facility].  Dr. Vela to enter orders.    Electronically signed by Sophie Grimaldo on 4/10/25 at 8:33 AM EDT

## 2025-04-17 ENCOUNTER — TRANSCRIBE ORDERS (OUTPATIENT)
Dept: ADMINISTRATIVE | Age: 45
End: 2025-04-17

## 2025-04-17 DIAGNOSIS — R10.84 ABDOMINAL PAIN, GENERALIZED: Primary | ICD-10-CM

## 2025-04-17 DIAGNOSIS — K85.90 ACUTE PANCREATITIS, UNSPECIFIED COMPLICATION STATUS, UNSPECIFIED PANCREATITIS TYPE: ICD-10-CM

## 2025-04-29 NOTE — PROGRESS NOTES
[facility]                                                                                                                    PRE OP INSTRUCTIONS FOR  Dillan Rob        Date: 4/29/2025    Date of surgery: 4/30/25   Arrival Time: Hospital will call you on 4-29-25 between 5pm and 7pm with your final arrival time for surgery. Go to     front of hospital and check in at information desk.    Nothing by mouth (NPO) as instructed. May have clear liquids up to 2 hours prior to surgery. Nothing solid after midnight. Examples: water, apple juice, black coffee, plain tea    Take the following medications with a small sip of water on the morning of Surgery: pantoprazole, wellbutrin     Diabetics may take half the evening dose of insulin but none after midnight.  If you feel symptomatic or have low blood sugar morning of surgery drink 1-2 ounces of apple juice only. If you take a weekly insulin injection _______________, stop 7 days prior to surgery. If you take _______________, stop 3-4 days prior to surgery.    Aspirin, Ibuprofen, Advil, Naproxen, other Anti-inflammatory products should be stopped before surgery as directed by your surgeon, cardiologist, or primary care Doctor. Herbal supplements and Vitamin E should be stopped five days prior.  May take Tylenol unless instructed otherwise by your surgeon.    Check with your Doctor regarding stopping Plavix, Coumadin, Lovenox, Eliquis, Effient, or other blood thinners such as, pradaxa, lixiana, xaralto and savaysa.    Do not smoke, chew tobacco, vape, or use illicit drugs and do not drink any alcoholic beverages 24 hours prior to surgery.    You may brush your teeth the morning of surgery.      You MUST make arrangements for a responsible adult, 18 and over, to take you home after your surgery. You will not be allowed to leave alone or drive yourself home.  You will need someone stay with you the first 24 hrs. Your surgery will be cancelled if

## 2025-04-30 ENCOUNTER — ANESTHESIA (OUTPATIENT)
Dept: OPERATING ROOM | Age: 45
End: 2025-04-30
Payer: OTHER GOVERNMENT

## 2025-04-30 ENCOUNTER — ANESTHESIA EVENT (OUTPATIENT)
Dept: OPERATING ROOM | Age: 45
End: 2025-04-30
Payer: OTHER GOVERNMENT

## 2025-04-30 ENCOUNTER — HOSPITAL ENCOUNTER (OUTPATIENT)
Age: 45
Setting detail: OUTPATIENT SURGERY
Discharge: HOME | End: 2025-04-30
Attending: SURGERY | Admitting: SURGERY
Payer: OTHER GOVERNMENT

## 2025-04-30 VITALS
HEIGHT: 71 IN | BODY MASS INDEX: 29.12 KG/M2 | OXYGEN SATURATION: 96 % | HEART RATE: 89 BPM | WEIGHT: 208 LBS | DIASTOLIC BLOOD PRESSURE: 87 MMHG | RESPIRATION RATE: 16 BRPM | SYSTOLIC BLOOD PRESSURE: 155 MMHG | TEMPERATURE: 98.2 F

## 2025-04-30 DIAGNOSIS — K44.9 HIATAL HERNIA: Primary | ICD-10-CM

## 2025-04-30 PROCEDURE — 3600000019 HC SURGERY ROBOT ADDTL 15MIN: Performed by: SURGERY

## 2025-04-30 PROCEDURE — 43289 UNLISTED LAPS PX ESOPH: CPT | Performed by: SURGERY

## 2025-04-30 PROCEDURE — 2709999900 HC NON-CHARGEABLE SUPPLY: Performed by: SURGERY

## 2025-04-30 PROCEDURE — 6360000002 HC RX W HCPCS: Performed by: NURSE ANESTHETIST, CERTIFIED REGISTERED

## 2025-04-30 PROCEDURE — 7100000011 HC PHASE II RECOVERY - ADDTL 15 MIN: Performed by: SURGERY

## 2025-04-30 PROCEDURE — 2500000003 HC RX 250 WO HCPCS: Performed by: NURSE ANESTHETIST, CERTIFIED REGISTERED

## 2025-04-30 PROCEDURE — 43282 LAP PARAESOPH HER RPR W/MESH: CPT | Performed by: SURGERY

## 2025-04-30 PROCEDURE — C1781 MESH (IMPLANTABLE): HCPCS | Performed by: SURGERY

## 2025-04-30 PROCEDURE — 3700000000 HC ANESTHESIA ATTENDED CARE: Performed by: SURGERY

## 2025-04-30 PROCEDURE — 3600000009 HC SURGERY ROBOT BASE: Performed by: SURGERY

## 2025-04-30 PROCEDURE — 2500000003 HC RX 250 WO HCPCS

## 2025-04-30 PROCEDURE — 3700000001 HC ADD 15 MINUTES (ANESTHESIA): Performed by: SURGERY

## 2025-04-30 PROCEDURE — 2500000003 HC RX 250 WO HCPCS: Performed by: SURGERY

## 2025-04-30 PROCEDURE — 2720000010 HC SURG SUPPLY STERILE: Performed by: SURGERY

## 2025-04-30 PROCEDURE — 2580000003 HC RX 258

## 2025-04-30 PROCEDURE — 7100000000 HC PACU RECOVERY - FIRST 15 MIN: Performed by: SURGERY

## 2025-04-30 PROCEDURE — 15734 MUSCLE-SKIN GRAFT TRUNK: CPT | Performed by: SURGERY

## 2025-04-30 PROCEDURE — 7100000001 HC PACU RECOVERY - ADDTL 15 MIN: Performed by: SURGERY

## 2025-04-30 PROCEDURE — 7100000010 HC PHASE II RECOVERY - FIRST 15 MIN: Performed by: SURGERY

## 2025-04-30 PROCEDURE — S2900 ROBOTIC SURGICAL SYSTEM: HCPCS | Performed by: SURGERY

## 2025-04-30 PROCEDURE — 6360000002 HC RX W HCPCS: Performed by: ANESTHESIOLOGY

## 2025-04-30 PROCEDURE — 6360000002 HC RX W HCPCS

## 2025-04-30 DEVICE — PHASIX ST MESH, 7 CM X 10 CM (3" X 4"), RECTANGLE
Type: IMPLANTABLE DEVICE | Site: ESOPHAGUS | Status: FUNCTIONAL
Brand: PHASIX

## 2025-04-30 RX ORDER — DEXAMETHASONE SODIUM PHOSPHATE 10 MG/ML
INJECTION, SOLUTION INTRAMUSCULAR; INTRAVENOUS
Status: DISCONTINUED | OUTPATIENT
Start: 2025-04-30 | End: 2025-04-30 | Stop reason: SDUPTHER

## 2025-04-30 RX ORDER — ONDANSETRON 2 MG/ML
INJECTION INTRAMUSCULAR; INTRAVENOUS
Status: DISCONTINUED | OUTPATIENT
Start: 2025-04-30 | End: 2025-04-30 | Stop reason: SDUPTHER

## 2025-04-30 RX ORDER — KETOROLAC TROMETHAMINE 30 MG/ML
INJECTION, SOLUTION INTRAMUSCULAR; INTRAVENOUS
Status: DISCONTINUED | OUTPATIENT
Start: 2025-04-30 | End: 2025-04-30 | Stop reason: SDUPTHER

## 2025-04-30 RX ORDER — HYDROMORPHONE HYDROCHLORIDE 2 MG/ML
INJECTION, SOLUTION INTRAMUSCULAR; INTRAVENOUS; SUBCUTANEOUS
Status: DISCONTINUED | OUTPATIENT
Start: 2025-04-30 | End: 2025-04-30 | Stop reason: SDUPTHER

## 2025-04-30 RX ORDER — LABETALOL HYDROCHLORIDE 5 MG/ML
INJECTION, SOLUTION INTRAVENOUS
Status: DISCONTINUED | OUTPATIENT
Start: 2025-04-30 | End: 2025-04-30 | Stop reason: SDUPTHER

## 2025-04-30 RX ORDER — MEPERIDINE HYDROCHLORIDE 25 MG/ML
12.5 INJECTION INTRAMUSCULAR; INTRAVENOUS; SUBCUTANEOUS EVERY 5 MIN PRN
Status: DISCONTINUED | OUTPATIENT
Start: 2025-04-30 | End: 2025-04-30 | Stop reason: HOSPADM

## 2025-04-30 RX ORDER — ROCURONIUM BROMIDE 10 MG/ML
INJECTION, SOLUTION INTRAVENOUS
Status: DISCONTINUED | OUTPATIENT
Start: 2025-04-30 | End: 2025-04-30 | Stop reason: SDUPTHER

## 2025-04-30 RX ORDER — LABETALOL HYDROCHLORIDE 5 MG/ML
10 INJECTION, SOLUTION INTRAVENOUS
Status: COMPLETED | OUTPATIENT
Start: 2025-04-30 | End: 2025-04-30

## 2025-04-30 RX ORDER — LIDOCAINE HYDROCHLORIDE 20 MG/ML
INJECTION, SOLUTION INTRAVENOUS
Status: DISCONTINUED | OUTPATIENT
Start: 2025-04-30 | End: 2025-04-30 | Stop reason: SDUPTHER

## 2025-04-30 RX ORDER — SODIUM CHLORIDE 0.9 % (FLUSH) 0.9 %
5-40 SYRINGE (ML) INJECTION PRN
Status: DISCONTINUED | OUTPATIENT
Start: 2025-04-30 | End: 2025-04-30 | Stop reason: HOSPADM

## 2025-04-30 RX ORDER — BUPIVACAINE HYDROCHLORIDE AND EPINEPHRINE 2.5; 5 MG/ML; UG/ML
INJECTION, SOLUTION EPIDURAL; INFILTRATION; INTRACAUDAL; PERINEURAL PRN
Status: DISCONTINUED | OUTPATIENT
Start: 2025-04-30 | End: 2025-04-30 | Stop reason: HOSPADM

## 2025-04-30 RX ORDER — SODIUM CHLORIDE 9 MG/ML
INJECTION, SOLUTION INTRAVENOUS PRN
Status: DISCONTINUED | OUTPATIENT
Start: 2025-04-30 | End: 2025-04-30 | Stop reason: HOSPADM

## 2025-04-30 RX ORDER — KETOROLAC TROMETHAMINE 30 MG/ML
30 INJECTION, SOLUTION INTRAMUSCULAR; INTRAVENOUS ONCE
Status: DISCONTINUED | OUTPATIENT
Start: 2025-04-30 | End: 2025-04-30 | Stop reason: HOSPADM

## 2025-04-30 RX ORDER — SODIUM CHLORIDE, SODIUM LACTATE, POTASSIUM CHLORIDE, CALCIUM CHLORIDE 600; 310; 30; 20 MG/100ML; MG/100ML; MG/100ML; MG/100ML
INJECTION, SOLUTION INTRAVENOUS CONTINUOUS
Status: DISCONTINUED | OUTPATIENT
Start: 2025-04-30 | End: 2025-04-30 | Stop reason: HOSPADM

## 2025-04-30 RX ORDER — ONDANSETRON 4 MG/1
4 TABLET, FILM COATED ORAL 3 TIMES DAILY PRN
Qty: 15 TABLET | Refills: 0 | Status: SHIPPED | OUTPATIENT
Start: 2025-04-30

## 2025-04-30 RX ORDER — FENTANYL CITRATE 0.05 MG/ML
25 INJECTION, SOLUTION INTRAMUSCULAR; INTRAVENOUS EVERY 5 MIN PRN
Status: DISCONTINUED | OUTPATIENT
Start: 2025-04-30 | End: 2025-04-30 | Stop reason: HOSPADM

## 2025-04-30 RX ORDER — PROPOFOL 10 MG/ML
INJECTION, EMULSION INTRAVENOUS
Status: DISCONTINUED | OUTPATIENT
Start: 2025-04-30 | End: 2025-04-30 | Stop reason: SDUPTHER

## 2025-04-30 RX ORDER — MIDAZOLAM HYDROCHLORIDE 1 MG/ML
INJECTION, SOLUTION INTRAMUSCULAR; INTRAVENOUS
Status: DISCONTINUED | OUTPATIENT
Start: 2025-04-30 | End: 2025-04-30 | Stop reason: SDUPTHER

## 2025-04-30 RX ORDER — SODIUM CHLORIDE 0.9 % (FLUSH) 0.9 %
5-40 SYRINGE (ML) INJECTION EVERY 12 HOURS SCHEDULED
Status: DISCONTINUED | OUTPATIENT
Start: 2025-04-30 | End: 2025-04-30 | Stop reason: HOSPADM

## 2025-04-30 RX ORDER — PROCHLORPERAZINE EDISYLATE 5 MG/ML
5 INJECTION INTRAMUSCULAR; INTRAVENOUS
Status: DISCONTINUED | OUTPATIENT
Start: 2025-04-30 | End: 2025-04-30 | Stop reason: HOSPADM

## 2025-04-30 RX ORDER — NALOXONE HYDROCHLORIDE 0.4 MG/ML
INJECTION, SOLUTION INTRAMUSCULAR; INTRAVENOUS; SUBCUTANEOUS PRN
Status: DISCONTINUED | OUTPATIENT
Start: 2025-04-30 | End: 2025-04-30 | Stop reason: HOSPADM

## 2025-04-30 RX ORDER — METHOCARBAMOL 750 MG/1
750 TABLET, FILM COATED ORAL 4 TIMES DAILY
Qty: 40 TABLET | Refills: 0 | Status: SHIPPED | OUTPATIENT
Start: 2025-04-30 | End: 2025-05-10

## 2025-04-30 RX ORDER — DROPERIDOL 2.5 MG/ML
0.62 INJECTION, SOLUTION INTRAMUSCULAR; INTRAVENOUS
Status: DISCONTINUED | OUTPATIENT
Start: 2025-04-30 | End: 2025-04-30 | Stop reason: HOSPADM

## 2025-04-30 RX ORDER — DOCUSATE SODIUM 100 MG/1
100 CAPSULE, LIQUID FILLED ORAL 2 TIMES DAILY
Qty: 60 CAPSULE | Refills: 0 | Status: SHIPPED | OUTPATIENT
Start: 2025-04-30 | End: 2025-05-30

## 2025-04-30 RX ORDER — FENTANYL CITRATE 50 UG/ML
INJECTION, SOLUTION INTRAMUSCULAR; INTRAVENOUS
Status: DISCONTINUED | OUTPATIENT
Start: 2025-04-30 | End: 2025-04-30 | Stop reason: SDUPTHER

## 2025-04-30 RX ORDER — IBUPROFEN 600 MG/1
600 TABLET, FILM COATED ORAL 3 TIMES DAILY PRN
Qty: 30 TABLET | Refills: 0 | Status: SHIPPED | OUTPATIENT
Start: 2025-04-30

## 2025-04-30 RX ORDER — METHOCARBAMOL 100 MG/ML
1000 INJECTION, SOLUTION INTRAMUSCULAR; INTRAVENOUS
Status: COMPLETED | OUTPATIENT
Start: 2025-04-30 | End: 2025-04-30

## 2025-04-30 RX ORDER — DIPHENHYDRAMINE HYDROCHLORIDE 50 MG/ML
12.5 INJECTION, SOLUTION INTRAMUSCULAR; INTRAVENOUS
Status: DISCONTINUED | OUTPATIENT
Start: 2025-04-30 | End: 2025-04-30 | Stop reason: HOSPADM

## 2025-04-30 RX ORDER — HYDRALAZINE HYDROCHLORIDE 20 MG/ML
10 INJECTION INTRAMUSCULAR; INTRAVENOUS
Status: COMPLETED | OUTPATIENT
Start: 2025-04-30 | End: 2025-04-30

## 2025-04-30 RX ORDER — IPRATROPIUM BROMIDE AND ALBUTEROL SULFATE 2.5; .5 MG/3ML; MG/3ML
1 SOLUTION RESPIRATORY (INHALATION)
Status: DISCONTINUED | OUTPATIENT
Start: 2025-04-30 | End: 2025-04-30 | Stop reason: HOSPADM

## 2025-04-30 RX ORDER — MIDAZOLAM HYDROCHLORIDE 1 MG/ML
2 INJECTION, SOLUTION INTRAMUSCULAR; INTRAVENOUS
Status: DISCONTINUED | OUTPATIENT
Start: 2025-04-30 | End: 2025-04-30 | Stop reason: HOSPADM

## 2025-04-30 RX ORDER — OXYCODONE AND ACETAMINOPHEN 5; 325 MG/1; MG/1
1 TABLET ORAL EVERY 6 HOURS PRN
Qty: 12 TABLET | Refills: 0 | Status: SHIPPED | OUTPATIENT
Start: 2025-04-30 | End: 2025-05-03

## 2025-04-30 RX ADMIN — FENTANYL CITRATE 50 MCG: 50 INJECTION, SOLUTION INTRAMUSCULAR; INTRAVENOUS at 07:21

## 2025-04-30 RX ADMIN — METHOCARBAMOL 1000 MG: 100 INJECTION INTRAMUSCULAR; INTRAVENOUS at 09:36

## 2025-04-30 RX ADMIN — SUGAMMADEX 500 MG: 100 INJECTION, SOLUTION INTRAVENOUS at 08:30

## 2025-04-30 RX ADMIN — MIDAZOLAM 2 MG: 1 INJECTION INTRAMUSCULAR; INTRAVENOUS at 07:00

## 2025-04-30 RX ADMIN — DEXAMETHASONE SODIUM PHOSPHATE 10 MG: 10 INJECTION, SOLUTION INTRAMUSCULAR; INTRAVENOUS at 07:11

## 2025-04-30 RX ADMIN — ROCURONIUM BROMIDE 10 MG: 10 INJECTION, SOLUTION INTRAVENOUS at 08:09

## 2025-04-30 RX ADMIN — FENTANYL CITRATE 50 MCG: 50 INJECTION, SOLUTION INTRAMUSCULAR; INTRAVENOUS at 08:09

## 2025-04-30 RX ADMIN — PROPOFOL 200 MG: 10 INJECTION, EMULSION INTRAVENOUS at 07:11

## 2025-04-30 RX ADMIN — HYDROMORPHONE HYDROCHLORIDE 1 MG: 2 INJECTION, SOLUTION INTRAMUSCULAR; INTRAVENOUS; SUBCUTANEOUS at 08:35

## 2025-04-30 RX ADMIN — Medication 50 MG: at 07:07

## 2025-04-30 RX ADMIN — FENTANYL CITRATE 150 MCG: 50 INJECTION, SOLUTION INTRAMUSCULAR; INTRAVENOUS at 07:11

## 2025-04-30 RX ADMIN — PHENYLEPHRINE HYDROCHLORIDE 50 MCG: 10 INJECTION INTRAVENOUS at 07:39

## 2025-04-30 RX ADMIN — HYDRALAZINE HYDROCHLORIDE 10 MG: 20 INJECTION INTRAMUSCULAR; INTRAVENOUS at 09:02

## 2025-04-30 RX ADMIN — PHENYLEPHRINE HYDROCHLORIDE 50 MCG: 10 INJECTION INTRAVENOUS at 07:36

## 2025-04-30 RX ADMIN — SODIUM CHLORIDE, POTASSIUM CHLORIDE, SODIUM LACTATE AND CALCIUM CHLORIDE: 600; 310; 30; 20 INJECTION, SOLUTION INTRAVENOUS at 06:19

## 2025-04-30 RX ADMIN — LABETALOL HYDROCHLORIDE 10 MG: 5 INJECTION, SOLUTION INTRAVENOUS at 07:23

## 2025-04-30 RX ADMIN — SODIUM CHLORIDE, POTASSIUM CHLORIDE, SODIUM LACTATE AND CALCIUM CHLORIDE: 600; 310; 30; 20 INJECTION, SOLUTION INTRAVENOUS at 07:38

## 2025-04-30 RX ADMIN — LIDOCAINE HYDROCHLORIDE 100 MG: 20 INJECTION, SOLUTION INTRAVENOUS at 07:11

## 2025-04-30 RX ADMIN — ONDANSETRON 4 MG: 2 INJECTION, SOLUTION INTRAMUSCULAR; INTRAVENOUS at 08:23

## 2025-04-30 RX ADMIN — ROCURONIUM BROMIDE 10 MG: 10 INJECTION, SOLUTION INTRAVENOUS at 08:21

## 2025-04-30 RX ADMIN — KETOROLAC TROMETHAMINE 30 MG: 30 INJECTION, SOLUTION INTRAMUSCULAR; INTRAVENOUS at 08:29

## 2025-04-30 RX ADMIN — HYDRALAZINE HYDROCHLORIDE 10 MG: 20 INJECTION INTRAMUSCULAR; INTRAVENOUS at 08:43

## 2025-04-30 RX ADMIN — ROCURONIUM BROMIDE 50 MG: 10 INJECTION, SOLUTION INTRAVENOUS at 07:11

## 2025-04-30 RX ADMIN — CEFAZOLIN SODIUM 2000 MG: 1 POWDER, FOR SOLUTION INTRAMUSCULAR; INTRAVENOUS at 07:00

## 2025-04-30 ASSESSMENT — PAIN - FUNCTIONAL ASSESSMENT
PAIN_FUNCTIONAL_ASSESSMENT: NONE - DENIES PAIN
PAIN_FUNCTIONAL_ASSESSMENT: NONE - DENIES PAIN
PAIN_FUNCTIONAL_ASSESSMENT: 0-10

## 2025-04-30 ASSESSMENT — PAIN SCALES - GENERAL
PAINLEVEL_OUTOF10: 3
PAINLEVEL_OUTOF10: 2
PAINLEVEL_OUTOF10: 0
PAINLEVEL_OUTOF10: 2

## 2025-04-30 ASSESSMENT — PAIN DESCRIPTION - DESCRIPTORS
DESCRIPTORS: DISCOMFORT
DESCRIPTORS: DULL
DESCRIPTORS: DULL

## 2025-04-30 ASSESSMENT — PAIN DESCRIPTION - LOCATION
LOCATION: ABDOMEN

## 2025-04-30 ASSESSMENT — PAIN DESCRIPTION - ORIENTATION
ORIENTATION: UPPER

## 2025-04-30 ASSESSMENT — LIFESTYLE VARIABLES: SMOKING_STATUS: 1

## 2025-04-30 ASSESSMENT — PAIN DESCRIPTION - PAIN TYPE: TYPE: SURGICAL PAIN

## 2025-04-30 ASSESSMENT — PAIN DESCRIPTION - FREQUENCY: FREQUENCY: CONTINUOUS

## 2025-04-30 NOTE — ANESTHESIA POSTPROCEDURE EVALUATION
Department of Anesthesiology  Postprocedure Note    Patient: Dillan Rob  MRN: [id number]  YOB: 1980  Date of evaluation: 4/30/2025    Procedure Summary       Date: 04/30/25 Room / Location: [address]    Anesthesia Start: 0655 Anesthesia Stop: 0842    Procedure: HERNIA HIATAL LAPAROSCOPIC ROBOTIC XI NO FUNDOPLICATION (Abdomen) Diagnosis:       Hiatal hernia      GERD (gastroesophageal reflux disease)      (Hiatal hernia [K44.9])      (GERD (gastroesophageal reflux disease) [K21.9])    Surgeons: Lefty Vela MD Responsible Provider: Hieu Luke MD    Anesthesia Type: general ASA Status: 3            Anesthesia Type: No value filed.    Ardha Phase I: Radha Score: 10    Radha Phase II: Radha Score: 10    Anesthesia Post Evaluation    Patient location during evaluation: PACU  Patient participation: complete - patient participated  Level of consciousness: awake  Airway patency: patent  Nausea & Vomiting: no nausea and no vomiting  Cardiovascular status: hemodynamically stable  Respiratory status: acceptable  Hydration status: euvolemic  Pain management: adequate    No notable events documented.

## 2025-04-30 NOTE — ANESTHESIA PRE PROCEDURE
Average packs/day: 0.5 packs/day for 15.0 years (7.5 ttl pk-yrs)     Types: Cigarettes     Start date: 2000     Last attempt to quit: 2015     Years since quitting: 10.3    Smokeless tobacco: Never   Substance Use Topics    Alcohol use: Yes     Alcohol/week: 3.0 standard drinks of alcohol     Types: 3 Shots of liquor per week     Comment: 3-4 drinks / 3-4 times a week whiskey                                Ready to quit: Not Answered  Counseling given: Not Answered      Vital Signs (Current):   Vitals:    04/29/25 0827 04/30/25 0551 04/30/25 0627   BP:  (!) 173/117 (!) 160/100   Pulse:  78    Resp:  16    Temp:  97.5 °F (36.4 °C)    TempSrc:  Infrared    SpO2:  97%    Weight: 93 kg (205 lb) 94.3 kg (208 lb)    Height: 1.803 m (5' 11\") 1.803 m (5' 11\")                                               BP Readings from Last 3 Encounters:   04/30/25 (!) 160/100   03/26/25 (!) 158/98   04/11/23 129/76       NPO Status: Time of last liquid consumption: 0001                        Time of last solid consumption: 2100                        Date of last liquid consumption: 04/30/25                        Date of last solid food consumption: 04/29/25    BMI:   Wt Readings from Last 3 Encounters:   04/30/25 94.3 kg (208 lb)   03/26/25 86.2 kg (190 lb)   03/06/24 86.2 kg (190 lb)     Body mass index is 29.01 kg/m².    CBC:   Lab Results   Component Value Date/Time    WBC 12.5 04/03/2023 04:39 PM    RBC 4.72 04/03/2023 04:39 PM    HGB 14.9 04/03/2023 04:39 PM    HCT 45.0 04/03/2023 04:39 PM    MCV 95.3 04/03/2023 04:39 PM    RDW 12.9 04/03/2023 04:39 PM     04/03/2023 04:39 PM       CMP:   Lab Results   Component Value Date/Time     04/03/2023 04:39 PM    K 3.8 04/03/2023 04:39 PM     04/03/2023 04:39 PM    CO2 25 04/03/2023 04:39 PM    BUN 10 04/03/2023 04:39 PM    CREATININE 1.0 04/03/2023 04:39 PM    GFRAA >60 02/03/2021 05:58 AM    LABGLOM >60 04/03/2023 04:39 PM    GLUCOSE 118 04/03/2023 04:39 PM    CALCIUM

## 2025-04-30 NOTE — H&P
General Surgery History and Physical  [facility]    Patient's Name/Date of Birth: Dillan Rob / 1980    Date: 04/30/25    Surgeon: Lefty Vela MD    PCP: Juan Mares MD     Chief Complaint: GERD, HIATAL HERNIA    HPI:   Dillan Rob is a 44 y.o. male who presents for evaluation of GERD and symptomatic hiatal hernia. Timing is intermittent with food, radiation to midline and epigastrium, alleviated by npo and started several months ago and severity is 8/10. Has been under medical management for GERD for many months, symptoms have become refractory to maximal medical therapy. Denies SOB, fever, chills, nausea, vomiting. Has had endoscopy performed to evaluate for esophagitis.     Patient Active Problem List   Diagnosis    GERD (gastroesophageal reflux disease)    Irritable bowel syndrome with diarrhea    Migraines    Pulmonary nodule RUL    Former smoker    IBS (irritable bowel syndrome)    Hyperlipidemia    Choledocholithiasis    Borderline abnormal TFTs    Gall stones, common bile duct    Calculus bile duct w/obstruction and w/o cholangitis or cholecystitis    Post-ERCP acute pancreatitis    SIRS (systemic inflammatory response syndrome) (HCC)    Pancreatic ascites    Pancreatic pseudocyst    Gallstone pancreatitis    Closed displaced fracture of lateral malleolus of left fibula    Hiatal hernia       Past Medical History:   Diagnosis Date    Anemia 11/2014    GERD (gastroesophageal reflux disease)     For the last 15+ yrs as of 2017.    History of blood transfusion 12/2017    anemia    IBS (irritable bowel syndrome)     with long-pattern of ~4 loose BMs per day, increased to 5-6/day in the latter half of 2017    DAJUAN on CPAP        Past Surgical History:   Procedure Laterality Date    ANKLE FRACTURE SURGERY Left 2/3/2021    LEFT ANKLE  OPEN REDUCTION INTERNAL FIXATION performed by Stan Smith MD at [facility] [place]    ANKLE FRACTURE SURGERY Right 4/11/2023    RIGHT ANKLE OPEN

## 2025-04-30 NOTE — DISCHARGE INSTRUCTIONS
only take the pills as needed and stop them as soon as possible. Pain meds cause constipation so pay close attention to the \"bowels\" topic above.     Keep incisions clean and dry.  Vicodin/Percocet and ibuprofen for pain as prescribed. Okay to resume anticoagulant medication after 24hrs.      Do not exceed 4000mg of Tylenol/Acetaminophen per day. Vicodin/Norco/Percocet contain acetaminophen. Do not take additional amounts of Tylenol if you are taking these medications.

## 2025-05-14 ENCOUNTER — OFFICE VISIT (OUTPATIENT)
Dept: SURGERY | Age: 45
End: 2025-05-14

## 2025-05-14 VITALS
TEMPERATURE: 98 F | BODY MASS INDEX: 29.12 KG/M2 | HEART RATE: 87 BPM | WEIGHT: 208 LBS | SYSTOLIC BLOOD PRESSURE: 173 MMHG | HEIGHT: 71 IN | DIASTOLIC BLOOD PRESSURE: 112 MMHG

## 2025-05-14 DIAGNOSIS — K44.9 HIATAL HERNIA: Primary | ICD-10-CM

## 2025-05-14 PROCEDURE — 99024 POSTOP FOLLOW-UP VISIT: CPT | Performed by: SURGERY

## 2025-05-14 NOTE — PROGRESS NOTES
General Surgery Office Note  [facility]  Consandre RODERCIK Vela MD    Patient's Name/Date of Birth: Dillan Rob / 1980    Date: May 14, 2025     Chief compaint: Postop visit from laparoscopic/robotic hiatal hernia repair    Surgeon: MD Dane    Patient Active Problem List   Diagnosis    GERD (gastroesophageal reflux disease)    Irritable bowel syndrome with diarrhea    Migraines    Pulmonary nodule RUL    Former smoker    IBS (irritable bowel syndrome)    Hyperlipidemia    Choledocholithiasis    Borderline abnormal TFTs    Gall stones, common bile duct    Calculus bile duct w/obstruction and w/o cholangitis or cholecystitis    Post-ERCP acute pancreatitis    SIRS (systemic inflammatory response syndrome) (HCC)    Pancreatic ascites    Pancreatic pseudocyst    Gallstone pancreatitis    Closed displaced fracture of lateral malleolus of left fibula    Hiatal hernia       Subjective: Doing well, no new complaints, Denies dysphagia, tolerating diet, bowel function normal, anxious to return to normal physical activity    Objective:  BP (!) 173/112   Pulse 87   Temp 98 °F (36.7 °C)   Ht 1.803 m (5' 11\")   Wt 94.3 kg (208 lb)   BMI 29.01 kg/m²   Labs:  No results for input(s): \"WBC\", \"HGB\", \"HCT\" in the last 72 hours.    Invalid input(s): \"PLR\"  Lab Results   Component Value Date    CREATININE 1.0 04/03/2023    BUN 10 04/03/2023     04/03/2023    K 3.8 04/03/2023     04/03/2023    CO2 25 04/03/2023     No results for input(s): \"LIPASE\", \"AMYLASE\" in the last 72 hours.    General appearance: AA, NAD  HEENT: NCAT, PERRLA, EOMI  Lungs: Clear, equal rise bilateral  Heart: Reg  Abdomen: soft, nondistended, nontender, incisions well healed, no signs of infection, no cellulitis, no hematoma      Assessment/Plan:  Dillan Rob is a 44 y.o. male 2 weeks s/p laparoscopic/robotic hiatal hernia repair. Doing well.    Resume diet and activity gradually   Follow up as needed    Physician Signature:

## 2025-05-20 ENCOUNTER — TELEPHONE (OUTPATIENT)
Dept: SURGERY | Age: 45
End: 2025-05-20

## 2025-05-20 NOTE — TELEPHONE ENCOUNTER
Patient phoned the office to report that following surgery, he has been suffering from extreme exhaustion and having bright green stool.  Patient informed that I will discuss with Dr. Vela and get back to him.  Electronically signed by Sophie Grimaldo on 5/20/25 at 3:55 PM EDT

## 2025-05-21 DIAGNOSIS — R53.83 EXHAUSTION: Primary | ICD-10-CM

## 2025-05-21 DIAGNOSIS — Z09 POSTOP CHECK: ICD-10-CM

## 2025-05-21 NOTE — TELEPHONE ENCOUNTER
Chest xray ordered by Dr. Vela and patient is to follow up with PCP.      Call placed to inform patient that he can go to any [facility] facility to have xray done.  Patient verbalized understanding and will go today for xray and will follow up with PCP regarding stool if it continues.

## 2025-05-22 ENCOUNTER — HOSPITAL ENCOUNTER (OUTPATIENT)
Dept: GENERAL RADIOLOGY | Age: 45
Discharge: HOME | End: 2025-05-24
Payer: OTHER GOVERNMENT

## 2025-05-22 DIAGNOSIS — Z09 POSTOP CHECK: ICD-10-CM

## 2025-05-22 DIAGNOSIS — R53.83 EXHAUSTION: ICD-10-CM

## 2025-05-22 PROCEDURE — 71046 X-RAY EXAM CHEST 2 VIEWS: CPT

## (undated) DEVICE — SET ORTHO STD STORTSTD1

## (undated) DEVICE — FENESTRATED BIPOLAR FORCEPS: Brand: ENDOWRIST

## (undated) DEVICE — KIT,ANTI FOG,W/SPONGE & FLUID,SOFT PACK: Brand: MEDLINE

## (undated) DEVICE — HARMONIC ACE

## (undated) DEVICE — SYRINGE 20ML LL S/C 50

## (undated) DEVICE — GOWN,SIRUS,NONRNF,SETINSLV,XL,20/CS: Brand: MEDLINE

## (undated) DEVICE — GARMENT,MEDLINE,DVT,INT,CALF,MED, GEN2: Brand: MEDLINE

## (undated) DEVICE — GOWN,SIRUS,POLYRNF,BRTHSLV,XLN/XXL,18/CS: Brand: MEDLINE

## (undated) DEVICE — DOUBLE BASIN SET: Brand: MEDLINE INDUSTRIES, INC.

## (undated) DEVICE — PROGRASP FORCEPS: Brand: ENDOWRIST

## (undated) DEVICE — WARMER SCP LAP

## (undated) DEVICE — GRADUATE TRIANG MEASURE 1000ML BLK PRNT

## (undated) DEVICE — ELECTRODE PT RET AD L9FT HI MOIST COND ADH HYDRGEL CORDED

## (undated) DEVICE — GOWN,SIRUS,FABRNF,XL,20/CS: Brand: MEDLINE

## (undated) DEVICE — ARM DRAPE

## (undated) DEVICE — GLOVE SURG SZ 65 THK91MIL LTX FREE SYN POLYISOPRENE

## (undated) DEVICE — SET ORTHO STD STORTSTD2

## (undated) DEVICE — GLOVE SURG SZ 8 L12IN FNGR THK79MIL GRN LTX FREE

## (undated) DEVICE — BLADE CLIPPER GEN PURP NS

## (undated) DEVICE — SOLUTION IV IRRIG POUR BRL 0.9% SODIUM CHL 2F7124

## (undated) DEVICE — SUCTION IRRIGATOR: Brand: ENDOWRIST

## (undated) DEVICE — SCISSORS SURG DIA8MM MPLR CRV ENDOWRIST

## (undated) DEVICE — TOWEL,OR,DSP,ST,BLUE,STD,6/PK,12PK/CS: Brand: MEDLINE

## (undated) DEVICE — Device

## (undated) DEVICE — ZIMMER® STERILE DISPOSABLE TOURNIQUET CUFF WITH PROTECTIVE SLEEVE AND PLC, DUAL PORT, SINGLE BLADDER, 34 IN. (86 CM)

## (undated) DEVICE — WIPES SKIN CLOTH READYPREP 9 X 10.5 IN 2% CHLORHEX GLUCONATE CHG PREOP

## (undated) DEVICE — RETRIEVAL BALLOON CATHETER: Brand: EXTRACTOR™ PRO RX

## (undated) DEVICE — PACK PROCEDURE SURG GEN CUST

## (undated) DEVICE — DRAPE,EXTREMITY,89X128,STERILE: Brand: MEDLINE

## (undated) DEVICE — Z INACTIVE USE 2660664 SOLUTION IRRIG 3000ML 0.9% SOD CHL USP UROMATIC PLAS CONT

## (undated) DEVICE — DRAPE,U/ SHT,SPLIT,PLAS,STERIL: Brand: MEDLINE

## (undated) DEVICE — INTENDED FOR TISSUE SEPARATION, AND OTHER PROCEDURES THAT REQUIRE A SHARP SURGICAL BLADE TO PUNCTURE OR CUT.: Brand: BARD-PARKER ® STAINLESS STEEL BLADES

## (undated) DEVICE — 4-PORT MANIFOLD: Brand: NEPTUNE 2

## (undated) DEVICE — SHEET,DRAPE,70X100,STERILE: Brand: MEDLINE

## (undated) DEVICE — DRESSING PETRO W3XL8IN OIL EMUL N ADH GZ KNIT IMPREG CELOS

## (undated) DEVICE — MEGA SUTURECUT ND: Brand: ENDOWRIST

## (undated) DEVICE — SPLINT ORTH W5INXL2.5FT PLSTR UNPD PRECUT FAST CRM

## (undated) DEVICE — CHLORAPREP 26ML ORANGE

## (undated) DEVICE — STANDARD HYPODERMIC NEEDLE,POLYPROPYLENE HUB: Brand: MONOJECT

## (undated) DEVICE — CADIERE FORCEPS: Brand: ENDOWRIST

## (undated) DEVICE — SEAL

## (undated) DEVICE — TRAY,SKIN SCRUB,DRY,W/GAUZE: Brand: MEDLINE

## (undated) DEVICE — GLOVE ORANGE PI 7 1/2   MSG9075

## (undated) DEVICE — BLOCK BITE 60FR RUBBER ADLT DENTAL

## (undated) DEVICE — PLUMEPORT LAPAROSCOPIC SMOKE FILTRATION DEVICE: Brand: PLUMEPORT ACTIV

## (undated) DEVICE — COLUMN DRAPE

## (undated) DEVICE — ELECTRO LUBE IS A SINGLE PATIENT USE DEVICE THAT IS INTENDED TO BE USED ON ELECTROSURGICAL ELECTRODES TO REDUCE STICKING.: Brand: KEY SURGICAL ELECTRO LUBE

## (undated) DEVICE — DRAPE C ARM W41XL74IN UNIV MOB W RUBBERBAND CLP

## (undated) DEVICE — CANNULA SEAL

## (undated) DEVICE — SURGICAL PROCEDURE PACK BASIC

## (undated) DEVICE — TUBING SUCT 12FR MAL ALUM SHFT FN CAP VENT UNIV CONN W/ OBT

## (undated) DEVICE — INSUFFLATION NEEDLE TO ESTABLISH PNEUMOPERITONEUM.: Brand: INSUFFLATION NEEDLE

## (undated) DEVICE — DRIP REDUCTION MANIFOLD

## (undated) DEVICE — TISSUE RETRIEVAL SYSTEM: Brand: INZII RETRIEVAL SYSTEM

## (undated) DEVICE — LIQUIBAND RAPID ADHESIVE 36/CS 0.8ML: Brand: MEDLINE

## (undated) DEVICE — SPHINCTEROTOME: Brand: DREAMTOME™ RX 44

## (undated) DEVICE — ZINACTIVE SUPPLIER CLOSED SOLUTION SURG SCRB POVIDONE IOD BDINE

## (undated) DEVICE — TROCAR: Brand: KII FIOS FIRST ENTRY

## (undated) DEVICE — BLADELESS OBTURATOR: Brand: WECK VISTA

## (undated) DEVICE — PANCREATIC STENT KIT
Type: IMPLANTABLE DEVICE | Status: NON-FUNCTIONAL
Brand: ADVANIX™ PANCREATIC STENT KIT
Removed: 2019-09-27

## (undated) DEVICE — SPONGE GZ W4XL4IN RAYON POLY FILL CVR W/ NONWOVEN FAB

## (undated) DEVICE — GOWN,BREATHABLE SLV,AURORA,XLG,STRL: Brand: MEDLINE

## (undated) DEVICE — DRILL SYSTEM 7

## (undated) DEVICE — GLOVE ORANGE PI 8   MSG9080

## (undated) DEVICE — DRAPE,LAP,CHOLE,W/TROUGHS,STERILE: Brand: MEDLINE

## (undated) DEVICE — SHEET,DRAPE,53X77,STERILE: Brand: MEDLINE

## (undated) DEVICE — SYSTEM BX CAP BILI RAP EXCHG CAP LOK DEV COMPATIBLE W/ OLY

## (undated) DEVICE — 3M™ COBAN™ NL STERILE NON-LATEX SELF-ADHERENT WRAP, 2084S, 4 IN X 5 YD (10 CM X 4,5 M), 18 ROLLS/CASE: Brand: 3M™ COBAN™

## (undated) DEVICE — BANDAGE COMPR W4INXL10YD WHITE/BEIGE E MTRX HK LOOP CLSR

## (undated) DEVICE — COVER,LIGHT HANDLE,FLX,1/PK: Brand: MEDLINE INDUSTRIES, INC.

## (undated) DEVICE — SHEET,DRAPE,40X58,STERILE: Brand: MEDLINE

## (undated) DEVICE — APPLICATOR MEDICATED 26 CC SOLUTION HI LT ORNG CHLORAPREP

## (undated) DEVICE — BIT DRL L110MM DIA2.5MM ST G QUIK CPL NONRADIOPAQUE W/O STP

## (undated) DEVICE — FORCEPS BX OVL CUP FEN DISPOSABLE CAP L 160CM RAD JAW 4

## (undated) DEVICE — PADDING,UNDERCAST,COTTON, 4"X4YD STERILE: Brand: MEDLINE

## (undated) DEVICE — SKIN AFFIX SURG ADHESIVE 72/CS 0.55ML: Brand: MEDLINE

## (undated) DEVICE — Z INACTIVE USE 2641839 CLIP INT M L POLYMER LOK LIG HEM O LOK

## (undated) DEVICE — GAUZE,SPONGE,AVANT,4"X4",4PLY,STRL,10/TR: Brand: MEDLINE

## (undated) DEVICE — NEEDLE HYPO 25GA L1.5IN BLU POLYPR HUB S STL REG BVL STR

## (undated) DEVICE — KIT BEDSIDE REVITAL OX 500ML

## (undated) DEVICE — CONVERTORS STOCKINETTE: Brand: CONVERTORS

## (undated) DEVICE — SYRINGE MED 10ML TRNSLUC BRL PLUNG BLK MRK POLYPR CTRL

## (undated) DEVICE — [HIGH FLOW INSUFFLATOR,  DO NOT USE IF PACKAGE IS DAMAGED,  KEEP DRY,  KEEP AWAY FROM SUNLIGHT,  PROTECT FROM HEAT AND RADIOACTIVE SOURCES.]: Brand: PNEUMOSURE

## (undated) DEVICE — TIP COVER ACCESSORY

## (undated) DEVICE — SOL IRR SOD CHL 0.9% TITAN XL CNTNR 3000ML

## (undated) DEVICE — MEDIUM-LARGE CLIP APPLIER: Brand: ENDOWRIST

## (undated) DEVICE — PATIENT RETURN ELECTRODE, SINGLE-USE, CONTACT QUALITY MONITORING, ADULT, WITH 9FT CORD, FOR PATIENTS WEIGING OVER 33LBS. (15KG): Brand: MEGADYNE

## (undated) DEVICE — MARKER,SKIN,WI/RULER AND LABELS: Brand: MEDLINE

## (undated) DEVICE — TIP-UP FENESTRATED GRASPER: Brand: ENDOWRIST

## (undated) DEVICE — PAD,ABDOMINAL,5"X9",ST,LF,25/BX: Brand: MEDLINE INDUSTRIES, INC.

## (undated) DEVICE — INSUFFLATION TUBING SET WITH FILTER, FUNNEL CONNECTOR AND LUER LOCK: Brand: JOSNOE MEDICAL INC